# Patient Record
Sex: FEMALE | Race: WHITE | NOT HISPANIC OR LATINO | Employment: PART TIME | URBAN - METROPOLITAN AREA
[De-identification: names, ages, dates, MRNs, and addresses within clinical notes are randomized per-mention and may not be internally consistent; named-entity substitution may affect disease eponyms.]

---

## 2018-03-20 ENCOUNTER — CONVERSION ENCOUNTER (OUTPATIENT)
Dept: PODIATRY | Facility: CLINIC | Age: 53
End: 2018-03-20

## 2018-03-20 ENCOUNTER — OFFICE VISIT CONVERTED (OUTPATIENT)
Dept: ORTHOPEDIC SURGERY | Facility: CLINIC | Age: 53
End: 2018-03-20
Attending: PHYSICIAN ASSISTANT

## 2018-03-20 ENCOUNTER — OFFICE VISIT CONVERTED (OUTPATIENT)
Dept: PODIATRY | Facility: CLINIC | Age: 53
End: 2018-03-20
Attending: PODIATRIST

## 2018-03-20 ENCOUNTER — CONVERSION ENCOUNTER (OUTPATIENT)
Dept: ORTHOPEDIC SURGERY | Facility: CLINIC | Age: 53
End: 2018-03-20

## 2018-03-26 ENCOUNTER — OFFICE VISIT CONVERTED (OUTPATIENT)
Dept: PODIATRY | Facility: CLINIC | Age: 53
End: 2018-03-26
Attending: PODIATRIST

## 2018-10-18 ENCOUNTER — OFFICE VISIT CONVERTED (OUTPATIENT)
Dept: FAMILY MEDICINE CLINIC | Facility: CLINIC | Age: 53
End: 2018-10-18
Attending: NURSE PRACTITIONER

## 2018-10-29 ENCOUNTER — OFFICE VISIT CONVERTED (OUTPATIENT)
Dept: SURGERY | Facility: CLINIC | Age: 53
End: 2018-10-29
Attending: SURGERY

## 2018-11-13 ENCOUNTER — CONVERSION ENCOUNTER (OUTPATIENT)
Dept: ORTHOPEDIC SURGERY | Facility: CLINIC | Age: 53
End: 2018-11-13

## 2018-11-13 ENCOUNTER — OFFICE VISIT CONVERTED (OUTPATIENT)
Dept: ORTHOPEDIC SURGERY | Facility: CLINIC | Age: 53
End: 2018-11-13
Attending: ORTHOPAEDIC SURGERY

## 2018-11-20 ENCOUNTER — OFFICE VISIT CONVERTED (OUTPATIENT)
Dept: FAMILY MEDICINE CLINIC | Facility: CLINIC | Age: 53
End: 2018-11-20
Attending: NURSE PRACTITIONER

## 2018-11-20 ENCOUNTER — CONVERSION ENCOUNTER (OUTPATIENT)
Dept: SURGERY | Facility: CLINIC | Age: 53
End: 2018-11-20

## 2018-11-30 ENCOUNTER — CONVERSION ENCOUNTER (OUTPATIENT)
Dept: GENERAL RADIOLOGY | Facility: HOSPITAL | Age: 53
End: 2018-11-30

## 2018-12-28 ENCOUNTER — OFFICE VISIT CONVERTED (OUTPATIENT)
Dept: ORTHOPEDIC SURGERY | Facility: CLINIC | Age: 53
End: 2018-12-28
Attending: PHYSICIAN ASSISTANT

## 2019-01-16 ENCOUNTER — OFFICE VISIT CONVERTED (OUTPATIENT)
Dept: FAMILY MEDICINE CLINIC | Facility: CLINIC | Age: 54
End: 2019-01-16
Attending: NURSE PRACTITIONER

## 2019-01-25 ENCOUNTER — OFFICE VISIT CONVERTED (OUTPATIENT)
Dept: ORTHOPEDIC SURGERY | Facility: CLINIC | Age: 54
End: 2019-01-25
Attending: ORTHOPAEDIC SURGERY

## 2019-02-18 ENCOUNTER — HOSPITAL ENCOUNTER (OUTPATIENT)
Dept: FAMILY MEDICINE CLINIC | Facility: CLINIC | Age: 54
Discharge: HOME | End: 2019-02-18
Attending: NURSE PRACTITIONER

## 2019-02-18 LAB
ALBUMIN SERPL-MCNC: 4.2 G/DL (ref 3.5–5)
ALBUMIN/GLOB SERPL: 1.4 {RATIO} (ref 1.4–2.6)
ALP SERPL-CCNC: 95 U/L (ref 53–141)
ALT SERPL-CCNC: 8 U/L (ref 10–40)
ANION GAP SERPL CALC-SCNC: 13 MMOL/L (ref 8–19)
AST SERPL-CCNC: 12 U/L (ref 15–50)
BASOPHILS # BLD AUTO: 0.05 10*3/UL (ref 0–0.2)
BASOPHILS NFR BLD AUTO: 0.8 % (ref 0–3)
BILIRUB SERPL-MCNC: 0.31 MG/DL (ref 0.2–1.3)
BUN SERPL-MCNC: 8 MG/DL (ref 5–25)
BUN/CREAT SERPL: 13 {RATIO} (ref 6–20)
CALCIUM SERPL-MCNC: 9.2 MG/DL (ref 8.7–10.4)
CHLORIDE SERPL-SCNC: 103 MMOL/L (ref 99–111)
CHOLEST SERPL-MCNC: 217 MG/DL (ref 107–200)
CHOLEST/HDLC SERPL: 3.4 {RATIO} (ref 3–6)
CONV ABS IMM GRAN: 0.02 10*3/UL (ref 0–0.2)
CONV CO2: 28 MMOL/L (ref 22–32)
CONV IMMATURE GRAN: 0.3 % (ref 0–1.8)
CONV TOTAL PROTEIN: 7.2 G/DL (ref 6.3–8.2)
CREAT UR-MCNC: 0.64 MG/DL (ref 0.5–0.9)
DEPRECATED RDW RBC AUTO: 42.5 FL (ref 36.4–46.3)
EOSINOPHIL # BLD AUTO: 0.09 10*3/UL (ref 0–0.7)
EOSINOPHIL # BLD AUTO: 1.5 % (ref 0–7)
ERYTHROCYTE [DISTWIDTH] IN BLOOD BY AUTOMATED COUNT: 13.1 % (ref 11.7–14.4)
FOLATE SERPL-MCNC: >20 NG/ML (ref 4.8–20)
GFR SERPLBLD BASED ON 1.73 SQ M-ARVRAT: >60 ML/MIN/{1.73_M2}
GLOBULIN UR ELPH-MCNC: 3 G/DL (ref 2–3.5)
GLUCOSE SERPL-MCNC: 84 MG/DL (ref 65–99)
HBA1C MFR BLD: 12.1 G/DL (ref 12–16)
HCT VFR BLD AUTO: 39.3 % (ref 37–47)
HDLC SERPL-MCNC: 64 MG/DL (ref 40–60)
IRON SATN MFR SERPL: 16 % (ref 20–55)
IRON SERPL-MCNC: 62 UG/DL (ref 60–170)
LDLC SERPL CALC-MCNC: 136 MG/DL (ref 70–100)
LYMPHOCYTES # BLD AUTO: 1.91 10*3/UL (ref 1–5)
MCH RBC QN AUTO: 27 PG (ref 27–31)
MCHC RBC AUTO-ENTMCNC: 30.8 G/DL (ref 33–37)
MCV RBC AUTO: 87.7 FL (ref 81–99)
MONOCYTES # BLD AUTO: 0.49 10*3/UL (ref 0.2–1.2)
MONOCYTES NFR BLD AUTO: 8.3 % (ref 3–10)
NEUTROPHILS # BLD AUTO: 3.33 10*3/UL (ref 2–8)
NEUTROPHILS NFR BLD AUTO: 56.7 % (ref 30–85)
NRBC CBCN: 0 % (ref 0–0.7)
OSMOLALITY SERPL CALC.SUM OF ELEC: 288 MOSM/KG (ref 273–304)
PLATELET # BLD AUTO: 336 10*3/UL (ref 130–400)
PMV BLD AUTO: 10 FL (ref 9.4–12.3)
POTASSIUM SERPL-SCNC: 4.3 MMOL/L (ref 3.5–5.3)
RBC # BLD AUTO: 4.48 10*6/UL (ref 4.2–5.4)
SODIUM SERPL-SCNC: 140 MMOL/L (ref 135–147)
TIBC SERPL-MCNC: 399 UG/DL (ref 245–450)
TRANSFERRIN SERPL-MCNC: 279 MG/DL (ref 250–380)
TRIGL SERPL-MCNC: 84 MG/DL (ref 40–150)
VARIANT LYMPHS NFR BLD MANUAL: 32.4 % (ref 20–45)
VIT B12 SERPL-MCNC: 171 PG/ML (ref 211–911)
VLDLC SERPL-MCNC: 17 MG/DL (ref 5–37)
WBC # BLD AUTO: 5.89 10*3/UL (ref 4.8–10.8)

## 2019-02-22 ENCOUNTER — OFFICE VISIT CONVERTED (OUTPATIENT)
Dept: FAMILY MEDICINE CLINIC | Facility: CLINIC | Age: 54
End: 2019-02-22
Attending: NURSE PRACTITIONER

## 2019-02-22 ENCOUNTER — OFFICE VISIT CONVERTED (OUTPATIENT)
Dept: ORTHOPEDIC SURGERY | Facility: CLINIC | Age: 54
End: 2019-02-22
Attending: ORTHOPAEDIC SURGERY

## 2019-02-22 ENCOUNTER — CONVERSION ENCOUNTER (OUTPATIENT)
Dept: FAMILY MEDICINE CLINIC | Facility: CLINIC | Age: 54
End: 2019-02-22

## 2019-04-05 ENCOUNTER — OFFICE VISIT CONVERTED (OUTPATIENT)
Dept: ORTHOPEDIC SURGERY | Facility: CLINIC | Age: 54
End: 2019-04-05
Attending: PHYSICIAN ASSISTANT

## 2019-04-05 ENCOUNTER — HOSPITAL ENCOUNTER (OUTPATIENT)
Dept: GENERAL RADIOLOGY | Facility: HOSPITAL | Age: 54
Discharge: HOME | End: 2019-04-05

## 2019-05-09 ENCOUNTER — OFFICE VISIT CONVERTED (OUTPATIENT)
Dept: FAMILY MEDICINE CLINIC | Facility: CLINIC | Age: 54
End: 2019-05-09
Attending: NURSE PRACTITIONER

## 2019-05-17 ENCOUNTER — HOSPITAL ENCOUNTER (OUTPATIENT)
Dept: FAMILY MEDICINE CLINIC | Facility: CLINIC | Age: 54
Discharge: HOME | End: 2019-05-17
Attending: NURSE PRACTITIONER

## 2019-05-17 ENCOUNTER — OFFICE VISIT CONVERTED (OUTPATIENT)
Dept: FAMILY MEDICINE CLINIC | Facility: CLINIC | Age: 54
End: 2019-05-17
Attending: NURSE PRACTITIONER

## 2019-05-19 LAB — BACTERIA UR CULT: NORMAL

## 2019-07-02 ENCOUNTER — OFFICE VISIT CONVERTED (OUTPATIENT)
Dept: FAMILY MEDICINE CLINIC | Facility: CLINIC | Age: 54
End: 2019-07-02
Attending: NURSE PRACTITIONER

## 2019-08-09 ENCOUNTER — HOSPITAL ENCOUNTER (OUTPATIENT)
Dept: GENERAL RADIOLOGY | Facility: HOSPITAL | Age: 54
Discharge: HOME | End: 2019-08-09

## 2019-11-14 ENCOUNTER — OFFICE VISIT CONVERTED (OUTPATIENT)
Dept: FAMILY MEDICINE CLINIC | Facility: CLINIC | Age: 54
End: 2019-11-14
Attending: NURSE PRACTITIONER

## 2020-04-28 ENCOUNTER — TELEMEDICINE CONVERTED (OUTPATIENT)
Dept: FAMILY MEDICINE CLINIC | Facility: CLINIC | Age: 55
End: 2020-04-28
Attending: NURSE PRACTITIONER

## 2020-06-15 ENCOUNTER — HOSPITAL ENCOUNTER (OUTPATIENT)
Dept: FAMILY MEDICINE CLINIC | Facility: CLINIC | Age: 55
Discharge: HOME | End: 2020-06-15
Attending: NURSE PRACTITIONER

## 2020-06-15 LAB
25(OH)D3 SERPL-MCNC: 34 NG/ML (ref 30–100)
ALBUMIN SERPL-MCNC: 4.2 G/DL (ref 3.5–5)
ALBUMIN/GLOB SERPL: 1.5 {RATIO} (ref 1.4–2.6)
ALP SERPL-CCNC: 88 U/L (ref 53–141)
ALT SERPL-CCNC: 15 U/L (ref 10–40)
ANION GAP SERPL CALC-SCNC: 15 MMOL/L (ref 8–19)
APPEARANCE UR: ABNORMAL
AST SERPL-CCNC: 19 U/L (ref 15–50)
BASOPHILS # BLD AUTO: 0.07 10*3/UL (ref 0–0.2)
BASOPHILS NFR BLD AUTO: 1.4 % (ref 0–3)
BILIRUB SERPL-MCNC: 0.5 MG/DL (ref 0.2–1.3)
BILIRUB UR QL: NEGATIVE
BUN SERPL-MCNC: 9 MG/DL (ref 5–25)
BUN/CREAT SERPL: 16 {RATIO} (ref 6–20)
CALCIUM SERPL-MCNC: 9.1 MG/DL (ref 8.7–10.4)
CHLORIDE SERPL-SCNC: 104 MMOL/L (ref 99–111)
CHOLEST SERPL-MCNC: 186 MG/DL (ref 107–200)
CHOLEST/HDLC SERPL: 2.7 {RATIO} (ref 3–6)
COLOR UR: YELLOW
CONV ABS IMM GRAN: 0.02 10*3/UL (ref 0–0.2)
CONV BACTERIA: NEGATIVE
CONV CO2: 26 MMOL/L (ref 22–32)
CONV COLLECTION SOURCE (UA): ABNORMAL
CONV IMMATURE GRAN: 0.4 % (ref 0–1.8)
CONV TOTAL PROTEIN: 7 G/DL (ref 6.3–8.2)
CONV UROBILINOGEN IN URINE BY AUTOMATED TEST STRIP: 0.2 {EHRLICHU}/DL (ref 0.1–1)
CREAT UR-MCNC: 0.57 MG/DL (ref 0.5–0.9)
DEPRECATED RDW RBC AUTO: 42.6 FL (ref 36.4–46.3)
EOSINOPHIL # BLD AUTO: 0.12 10*3/UL (ref 0–0.7)
EOSINOPHIL # BLD AUTO: 2.4 % (ref 0–7)
ERYTHROCYTE [DISTWIDTH] IN BLOOD BY AUTOMATED COUNT: 12.9 % (ref 11.7–14.4)
FOLATE SERPL-MCNC: 18.9 NG/ML (ref 4.8–20)
GFR SERPLBLD BASED ON 1.73 SQ M-ARVRAT: >60 ML/MIN/{1.73_M2}
GLOBULIN UR ELPH-MCNC: 2.8 G/DL (ref 2–3.5)
GLUCOSE SERPL-MCNC: 91 MG/DL (ref 65–99)
GLUCOSE UR QL: NEGATIVE MG/DL
HCT VFR BLD AUTO: 41 % (ref 37–47)
HDLC SERPL-MCNC: 70 MG/DL (ref 40–60)
HGB BLD-MCNC: 13.2 G/DL (ref 12–16)
HGB UR QL STRIP: NEGATIVE
IRON SATN MFR SERPL: 28 % (ref 20–55)
IRON SERPL-MCNC: 96 UG/DL (ref 60–170)
KETONES UR QL STRIP: NEGATIVE MG/DL
LDLC SERPL CALC-MCNC: 100 MG/DL (ref 70–100)
LEUKOCYTE ESTERASE UR QL STRIP: NEGATIVE
LYMPHOCYTES # BLD AUTO: 2.01 10*3/UL (ref 1–5)
LYMPHOCYTES NFR BLD AUTO: 40.6 % (ref 20–45)
MCH RBC QN AUTO: 29.2 PG (ref 27–31)
MCHC RBC AUTO-ENTMCNC: 32.2 G/DL (ref 33–37)
MCV RBC AUTO: 90.7 FL (ref 81–99)
MONOCYTES # BLD AUTO: 0.46 10*3/UL (ref 0.2–1.2)
MONOCYTES NFR BLD AUTO: 9.3 % (ref 3–10)
NEUTROPHILS # BLD AUTO: 2.27 10*3/UL (ref 2–8)
NEUTROPHILS NFR BLD AUTO: 45.9 % (ref 30–85)
NITRITE UR QL STRIP: NEGATIVE
NRBC CBCN: 0 % (ref 0–0.7)
OSMOLALITY SERPL CALC.SUM OF ELEC: 290 MOSM/KG (ref 273–304)
PH UR STRIP.AUTO: 7.5 [PH] (ref 5–8)
PLATELET # BLD AUTO: 274 10*3/UL (ref 130–400)
PMV BLD AUTO: 10.7 FL (ref 9.4–12.3)
POTASSIUM SERPL-SCNC: 3.9 MMOL/L (ref 3.5–5.3)
PROT UR QL: NEGATIVE MG/DL
RBC # BLD AUTO: 4.52 10*6/UL (ref 4.2–5.4)
RBC #/AREA URNS HPF: ABNORMAL /[HPF]
SODIUM SERPL-SCNC: 141 MMOL/L (ref 135–147)
SP GR UR: 1.01 (ref 1–1.03)
TIBC SERPL-MCNC: 339 UG/DL (ref 245–450)
TRANSFERRIN SERPL-MCNC: 237 MG/DL (ref 250–380)
TRIGL SERPL-MCNC: 79 MG/DL (ref 40–150)
VIT B12 SERPL-MCNC: 530 PG/ML (ref 211–911)
VLDLC SERPL-MCNC: 16 MG/DL (ref 5–37)
WBC # BLD AUTO: 4.95 10*3/UL (ref 4.8–10.8)
WBC #/AREA URNS HPF: ABNORMAL /[HPF]

## 2020-06-19 ENCOUNTER — HOSPITAL ENCOUNTER (OUTPATIENT)
Dept: FAMILY MEDICINE CLINIC | Facility: CLINIC | Age: 55
Discharge: HOME | End: 2020-06-19
Attending: NURSE PRACTITIONER

## 2020-06-19 ENCOUNTER — OFFICE VISIT CONVERTED (OUTPATIENT)
Dept: FAMILY MEDICINE CLINIC | Facility: CLINIC | Age: 55
End: 2020-06-19
Attending: NURSE PRACTITIONER

## 2020-06-25 LAB
CONV LAST MENSTURAL PERIOD: NORMAL
SPECIMEN SOURCE: NORMAL
SPECIMEN SOURCE: NORMAL
THIN PREP CVX: NORMAL

## 2020-12-21 ENCOUNTER — HOSPITAL ENCOUNTER (OUTPATIENT)
Dept: OTHER | Facility: HOSPITAL | Age: 55
Discharge: HOME | End: 2020-12-21
Attending: NURSE PRACTITIONER

## 2020-12-23 LAB
CONV MUMPS ANTIBODY IGG: 15.4 AU/ML
HBV SURFACE AB SER QL: NON REACTIVE
MEV IGG SER IA-ACNC: 41.1 AU/ML
RUBV IGG SER-ACNC: 63.62 [IU]/ML
VZV IGG SER IA-ACNC: 441 INDEX

## 2021-01-04 ENCOUNTER — HOSPITAL ENCOUNTER (OUTPATIENT)
Dept: OTHER | Facility: HOSPITAL | Age: 56
Discharge: HOME | End: 2021-01-04
Attending: EMERGENCY MEDICINE

## 2021-01-08 ENCOUNTER — OFFICE VISIT CONVERTED (OUTPATIENT)
Dept: FAMILY MEDICINE CLINIC | Facility: CLINIC | Age: 56
End: 2021-01-08
Attending: NURSE PRACTITIONER

## 2021-01-08 ENCOUNTER — HOSPITAL ENCOUNTER (OUTPATIENT)
Dept: FAMILY MEDICINE CLINIC | Facility: CLINIC | Age: 56
Discharge: HOME | End: 2021-01-08
Attending: NURSE PRACTITIONER

## 2021-01-08 ENCOUNTER — CONVERSION ENCOUNTER (OUTPATIENT)
Dept: FAMILY MEDICINE CLINIC | Facility: CLINIC | Age: 56
End: 2021-01-08

## 2021-01-08 LAB
ALBUMIN SERPL-MCNC: 4.4 G/DL (ref 3.5–5)
ALBUMIN/GLOB SERPL: 1.6 {RATIO} (ref 1.4–2.6)
ALP SERPL-CCNC: 92 U/L (ref 53–141)
ALT SERPL-CCNC: 24 U/L (ref 10–40)
ANION GAP SERPL CALC-SCNC: 15 MMOL/L (ref 8–19)
AST SERPL-CCNC: 26 U/L (ref 15–50)
BASOPHILS # BLD AUTO: 0.07 10*3/UL (ref 0–0.2)
BASOPHILS NFR BLD AUTO: 1.3 % (ref 0–3)
BILIRUB SERPL-MCNC: 0.32 MG/DL (ref 0.2–1.3)
BUN SERPL-MCNC: 8 MG/DL (ref 5–25)
BUN/CREAT SERPL: 11 {RATIO} (ref 6–20)
CALCIUM SERPL-MCNC: 9.4 MG/DL (ref 8.7–10.4)
CHLORIDE SERPL-SCNC: 102 MMOL/L (ref 99–111)
CHOLEST SERPL-MCNC: 244 MG/DL (ref 107–200)
CHOLEST/HDLC SERPL: 3.3 {RATIO} (ref 3–6)
CONV ABS IMM GRAN: 0.02 10*3/UL (ref 0–0.2)
CONV CO2: 27 MMOL/L (ref 22–32)
CONV IMMATURE GRAN: 0.4 % (ref 0–1.8)
CONV TOTAL PROTEIN: 7.1 G/DL (ref 6.3–8.2)
CREAT UR-MCNC: 0.7 MG/DL (ref 0.5–0.9)
DEPRECATED RDW RBC AUTO: 40.3 FL (ref 36.4–46.3)
EOSINOPHIL # BLD AUTO: 0.11 10*3/UL (ref 0–0.7)
EOSINOPHIL # BLD AUTO: 2 % (ref 0–7)
ERYTHROCYTE [DISTWIDTH] IN BLOOD BY AUTOMATED COUNT: 12.2 % (ref 11.7–14.4)
FOLATE SERPL-MCNC: 7.8 NG/ML (ref 4.8–20)
GFR SERPLBLD BASED ON 1.73 SQ M-ARVRAT: >60 ML/MIN/{1.73_M2}
GLOBULIN UR ELPH-MCNC: 2.7 G/DL (ref 2–3.5)
GLUCOSE SERPL-MCNC: 88 MG/DL (ref 65–99)
HCT VFR BLD AUTO: 42.2 % (ref 37–47)
HDLC SERPL-MCNC: 73 MG/DL (ref 40–60)
HGB BLD-MCNC: 13.7 G/DL (ref 12–16)
LDLC SERPL CALC-MCNC: 145 MG/DL (ref 70–100)
LYMPHOCYTES # BLD AUTO: 2.11 10*3/UL (ref 1–5)
LYMPHOCYTES NFR BLD AUTO: 38.9 % (ref 20–45)
MCH RBC QN AUTO: 29.1 PG (ref 27–31)
MCHC RBC AUTO-ENTMCNC: 32.5 G/DL (ref 33–37)
MCV RBC AUTO: 89.8 FL (ref 81–99)
MONOCYTES # BLD AUTO: 0.51 10*3/UL (ref 0.2–1.2)
MONOCYTES NFR BLD AUTO: 9.4 % (ref 3–10)
NEUTROPHILS # BLD AUTO: 2.6 10*3/UL (ref 2–8)
NEUTROPHILS NFR BLD AUTO: 48 % (ref 30–85)
NRBC CBCN: 0 % (ref 0–0.7)
OSMOLALITY SERPL CALC.SUM OF ELEC: 288 MOSM/KG (ref 273–304)
PLATELET # BLD AUTO: 324 10*3/UL (ref 130–400)
PMV BLD AUTO: 10.2 FL (ref 9.4–12.3)
POTASSIUM SERPL-SCNC: 4.2 MMOL/L (ref 3.5–5.3)
RBC # BLD AUTO: 4.7 10*6/UL (ref 4.2–5.4)
SODIUM SERPL-SCNC: 140 MMOL/L (ref 135–147)
TRIGL SERPL-MCNC: 129 MG/DL (ref 40–150)
VIT B12 SERPL-MCNC: 293 PG/ML (ref 211–911)
VLDLC SERPL-MCNC: 26 MG/DL (ref 5–37)
WBC # BLD AUTO: 5.42 10*3/UL (ref 4.8–10.8)

## 2021-01-09 LAB
25(OH)D3 SERPL-MCNC: 29.2 NG/ML (ref 30–100)
IRON SATN MFR SERPL: 27 % (ref 20–55)
IRON SERPL-MCNC: 107 UG/DL (ref 60–170)
TIBC SERPL-MCNC: 390 UG/DL (ref 245–450)
TRANSFERRIN SERPL-MCNC: 273 MG/DL (ref 250–380)

## 2021-01-10 LAB — BACTERIA UR CULT: NORMAL

## 2021-05-12 NOTE — PROGRESS NOTES
"   Progress Note      Patient Name: Pam Cade   Patient ID: [id number]   Sex: Female   YOB: 1965    Primary Care Provider: Ally TOSCANO   Referring Provider: Ally TOSCANO    Visit Date: April 28, 2020    Provider: DORCAS Shine   Location: [facility]   Location Address: [address] [place] [address]  [id number]   Location Phone: [phone]          Chief Complaint  · 6 month follow-up on HTN, hyperlipidemia, GERD, and migraines      History Of Present Illness  Video Conferencing Visit  Pam Cade is a 55 year old /White female who is presenting for evaluation via video conferencing. Verbal consent obtained before beginning visit.   The following staff were present during this visit: Candice Edge MA   Pam Cade is a 55 year old /White female who presents for evaluation and treatment of:      6 month follow-up on HTN, hyperlipidemia, GERD, and migraines    Had hernia repair and gallbladder removal in November with Lalo Cool at [facility]   \"Feels so much better, like a new person\"    pt states Will get labs drawn after COVID protocols calm down at [facility]  Still has lab requisition that is due in May    checking blood pressure at home  today's reading 128/76       Past Medical History  Disease Name Date Onset Notes   Ankle pain --  --    Arthritis --  --    B12 deficiency 02/22/2019 --    Bladder Disorder --  --    Cramps of lower extremity --  --    Folic acid deficiency 02/22/2019 --    Foot pain, left 03/20/2018 --    High cholesterol --  --    Hypertension --  --    Iron deficiency 02/22/2019 --    Kidney problem --  --    Reflux --  --    Ulcer --  --          Past Surgical History  Procedure Name Date Notes   Artificial Joints/Limbs --  --    Bladder sling surgery in female --  --    Hysterectomy --  --    Joint Surgery --  --    Knee replacement, left --  --    Neurostimulator. --  -- "          Medication List  Name Date Started Instructions   cyanocobalamin (vitamin B-12) 1,000 mcg/mL injection solution 04/28/2020 inject 0.1 milliliter (100 mcg) by intramuscular route once a month for 90 days   fluticasone propionate 50 mcg/actuation nasal spray,suspension 04/28/2020 inhale 2 sprays (100 mcg) in each nostril by intranasal route once daily for 90 days   folic acid 1 mg oral tablet 04/28/2020 take 1 tablet (1 mg) by oral route once daily for 90 days   metoprolol succinate 25 mg oral tablet extended release 24 hr 04/28/2020 take 1 tablet (25 mg) by oral route once daily for 90 days   omeprazole 20 mg oral tablet,delayed release (DR/EC) 04/28/2020 take 1 tablet by oral route daily for 90 days   ONE DAILY MULTIVITAMIN WOMENS 200'S 03/17/2020 TAKE 1 TABLET BY MOUTH EVERY DAY WITH FOOD   Relpax 40 mg oral tablet 04/28/2020 take 1 tablet (40 mg) by oral route ; if headache returns, the dose may be repeated after 2 hours   Topamax 50 mg oral tablet 04/28/2020 one tablet po bid   Tums Ultra 1,177 mg oral tablet,chewable  chew 3-4 tablets by oral route daily as needed   valacyclovir 500 mg oral tablet 04/28/2020 take 1 tablet (500 mg) by oral route every 12 hours for 3 days   Vitamin C 500 mg oral tablet extended release 04/28/2020 take 1 tablet by oral route daily for 90 days   Vitamin D3 125 mcg (5,000 unit) oral tablet 04/28/2020 take 1 tablet by oral route daily for 90 days         Allergy List  Allergen Name Date Reaction Notes   NO KNOWN DRUG ALLERGIES --  --  --          Family Medical History  Disease Name Relative/Age Notes   Cancer, Unspecified Daughter/  Sister/   Sister; Daughter   *No Known Family History  --    Family history of certain chronic disabling diseases; arthritis Daughter/   Daughter         Social History  Finding Status Start/Stop Quantity Notes   Alcohol Never --/-- --  --    Alcohol Use Current some day --/-- --  occasionally drinks, has been drinking for 6-10 years  11/14/2017  - occasionally drinks, has been drinking for 6-10 years   Claustophobic Unknown --/-- --  yes   lives alone --  --/-- --  --    lives with spouse --  --/-- --  --    Recreational Drug Use Never --/-- --  no   Tobacco Never --/-- --  never smoker    --  --/-- --  --    Working --  --/-- --  --          Immunizations  NameDate Admin Mfg Trade Name Lot Number Route Inj VIS Given VIS Publication   Tdap11/08/2018 SKB BOOSTRIX [id number] IM LD 11/08/2018 02/24/2015   Comments: Pt tolerated well         Review of Systems  · Constitutional  o Denies  o : fatigue  · Eyes  o Denies  o : blurred vision, changes in vision  · HENT  o Denies  o : headaches  · Cardiovascular  o Denies  o : chest pain, irregular heart beats, rapid heart rate, dyspnea on exertion  · Respiratory  o Denies  o : shortness of breath, wheezing, cough  · Gastrointestinal  o Denies  o : nausea, vomiting, diarrhea, constipation, abdominal pain, blood in stools, melena  · Genitourinary  o Denies  o : frequency, dysuria, hematuria  · Integument  o Denies  o : rash, new skin lesions  · Musculoskeletal  o Denies  o : joint pain, joint swelling, muscle pain  · Endocrine  o Admits  o : central obesity  o Denies  o : polyuria, polydipsia      Physical Examination  · Constitutional  o Appearance  o : well-nourished, in no acute distress  · Eyes  o Conjunctivae  o : conjunctivae normal  o Sclerae  o : sclerae white  o Eyelids/Ocular Adnexae  o : eyelid appearance normal, no exudates present  · Neck  o Inspection/Palpation  o : normal appearance, no masses or tenderness, trachea midline  o Range of Motion  o : cervical range of motion within normal limits  o Thyroid  o : gland size normal, nontender, no nodules or masses present on palpation  · Respiratory  o Respiratory Effort  o : breathing unlabored  o Inspection of Chest  o : normal appearance  o Auscultation of Lungs  o : normal breath sounds throughout inspiration and expiration  · Cardiovascular  o Heart  o :    § Auscultation of Heart  § : regular rate and rhythm, no murmurs, gallops or rubs  o Peripheral Vascular System  o :   § Carotid Arteries  § : normal pulses bilaterally, no bruits present  § Pedal Pulses  § : pulses 2 bilaterally  § Extremities  § : no clubbing or edema  · Musculoskeletal  o Spine  o :   § Inspection/Palpation  § : no spinal tenderness, scoliosis or kyphosis present  § Range of Motion  § : spine range of motion normal  o Right Upper Extremity  o :   § Inspection/Palpation  § : no tenderness to palpation, ROM normal  o Left Upper Extremity  o :   § Inspection/Palpation  § : no tenderness to palpation, ROM normal  o Right Lower Extremity  o :   § Inspection/Palpation  § : no joint or limb tenderness to palpation, ROM normal  o Left Lower Extremity  o :   § Inspection/Palpation  § : no joint or limb tenderness to palpation, ROM normal  · Skin and Subcutaneous Tissue  o General Inspection  o : no rashes or lesions present, no areas of discoloration  o Body Hair  o : hair normal for age, general body hair distribution normal for age  o Digits and Nails  o : no clubbing, cyanosis, deformities or edema present, normal appearing nails  · Neurologic  o Mental Status Examination  o :   § Orientation  § : grossly oriented to person, place and time  o Gait and Station  o : normal gait, able to stand without difficulty  · Psychiatric  o Judgement and Insight  o : judgment and insight intact  o Mood and Affect  o : mood normal, affect appropriate          Assessment  · Allergic rhinitis due to allergen     477.9/J30.9  currently controlled   · Essential hypertension     401.9/I10  currently controlled   · GERD (gastroesophageal reflux disease)     530.81/K21.9  currently controlled   · Hyperlipidemia     272.4/E78.5  will obtain lipid panel   · Vitamin D deficiency     268.9/E55.9  continue supplementation   · Migraines     346.90/G43.909  currently controlled   · B12 deficiency     266.2/E53.8  continue  supplementation   · Folic acid deficiency     266.2/E53.8  continue supplementation      Plan  · Orders  o ACO-39: Current medications updated and reviewed () - - 04/28/2020  o ACO-14: Influenza immunization administered or previously received () - - 04/28/2020  · Medications  o cyanocobalamin (vitamin B-12) 1,000 mcg/mL injection solution   SIG: inject 0.1 milliliter (100 mcg) by intramuscular route once a month for 90 days   DISP: (3) 1 ml vial with 1 refills  Refilled on 04/28/2020     o fluticasone propionate 50 mcg/actuation nasal spray,suspension   SIG: inhale 2 sprays (100 mcg) in each nostril by intranasal route once daily for 90 days   DISP: (3) 16 gm aer w/adap with 1 refills  Refilled on 04/28/2020     o folic acid 1 mg oral tablet   SIG: take 1 tablet (1 mg) by oral route once daily for 90 days   DISP: (90) tablets with 1 refills  Refilled on 04/28/2020     o metoprolol succinate 25 mg oral tablet extended release 24 hr   SIG: take 1 tablet (25 mg) by oral route once daily for 90 days   DISP: (90) tablet with 1 refills  Refilled on 04/28/2020     o omeprazole 20 mg oral tablet,delayed release (DR/EC)   SIG: take 1 tablet by oral route daily for 90 days   DISP: (90) tablets with 1 refills  Refilled on 04/28/2020     o Relpax 40 mg oral tablet   SIG: take 1 tablet (40 mg) by oral route ; if headache returns, the dose may be repeated after 2 hours   DISP: (10) tablets with 1 refills  Refilled on 04/28/2020     o Topamax 50 mg oral tablet   SIG: one tablet po bid   DISP: (180) tablets with 1 refills  Refilled on 04/28/2020     o valacyclovir 500 mg oral tablet   SIG: take 1 tablet (500 mg) by oral route every 12 hours for 3 days   DISP: (6) tablets with 5 refills  Refilled on 04/28/2020     o Vitamin C 500 mg oral tablet extended release   SIG: take 1 tablet by oral route daily for 90 days   DISP: (90) tablets with 1 refills  Refilled on 04/28/2020     o Vitamin D3 5,000 unit oral tablet   SIG:  take 1 tablet by oral route daily for 90 days   DISP: (90) tablets with 1 refills  Refilled on 04/28/2020     · Instructions  o Patient was educated/instructed on their diagnosis, treatment and medications prior to discharge from the clinic today.            Electronically Signed by: DORCAS Shine -Author on April 28, 2020 09:15:35 AM

## 2021-05-13 NOTE — PROGRESS NOTES
Progress Note      Patient Name: Pam Cade   Patient ID: [id number]   Sex: Female   YOB: 1965    Primary Care Provider: Ally TOSCANO   Referring Provider: Ally TOSCANO    Visit Date: June 19, 2020    Provider: DORCAS Shine   Location: [facility]   Location Address: [address] [place] [address]  [id number]   Location Phone: [phone]          Chief Complaint  · Annual Exam  · PAP exam  · follow-up on HTN, hyperlipidemia, GERD, and migraines   · Lab results      History Of Present Illness  Last PAP Smear: 11/2018   Date of Last Mammogram: 2018   Date of Last Colonoscopy: 11/2018   Pam Cade is a 55 year old /White female who presents for evaluation and treatment of:      follow-up on HTN, hyperlipidemia, GERD, and migraines   lab results    Patient is here for a PAP    Patient has asked for a TDAP, her daughter is having a baby    Patient had a hysterectomy 28 years ago and that was when she had her last menstrual cycle  after hemorrhage with last pregnancy, ovaries remain     Patient is due for a Mammo    pt c/o sinus pressure and sinus congestion for a few days  taking allergy  medication as directed  doing more activities outside       Past Medical History  Disease Name Date Onset Notes   Ankle pain --  --    Arthritis --  --    B12 deficiency 02/22/2019 --    Bladder Disorder --  --    Cramps of lower extremity --  --    Folic acid deficiency 02/22/2019 --    Foot pain, left 03/20/2018 --    High cholesterol --  --    Hypertension --  --    Iron deficiency 02/22/2019 --    Kidney problem --  --    Reflux --  --    Ulcer --  --          Past Surgical History  Procedure Name Date Notes   Artificial Joints/Limbs --  --    Bladder sling surgery in female --  --    Hysterectomy --  --    Joint Surgery --  --    Knee replacement, left --  --    Neurostimulator. --  --          Medication List  Name Date Started  Instructions   cyanocobalamin (vitamin B-12) 1,000 mcg/mL injection solution 04/28/2020 inject 0.1 milliliter (100 mcg) by intramuscular route once a month for 90 days   fluticasone propionate 50 mcg/actuation nasal spray,suspension 04/28/2020 inhale 2 sprays (100 mcg) in each nostril by intranasal route once daily for 90 days   folic acid 1 mg oral tablet 04/28/2020 take 1 tablet (1 mg) by oral route once daily for 90 days   metoprolol succinate 25 mg oral tablet extended release 24 hr 04/28/2020 take 1 tablet (25 mg) by oral route once daily for 90 days   ONE DAILY MULTIVITAMIN WOMENS 200'S 06/19/2020 1 tab daily   Relpax 40 mg oral tablet 04/28/2020 take 1 tablet (40 mg) by oral route ; if headache returns, the dose may be repeated after 2 hours   Topamax 50 mg oral tablet 04/28/2020 one tablet po bid   Tums Ultra 1,177 mg oral tablet,chewable  chew 3-4 tablets by oral route daily as needed   valacyclovir 500 mg oral tablet 04/28/2020 take 1 tablet (500 mg) by oral route every 12 hours for 3 days   Vitamin C 500 mg oral tablet extended release 04/28/2020 take 1 tablet by oral route daily for 90 days   Vitamin D3 125 mcg (5,000 unit) oral tablet 04/28/2020 take 1 tablet by oral route daily for 90 days         Allergy List  Allergen Name Date Reaction Notes   NO KNOWN DRUG ALLERGIES --  --  --          Family Medical History  Disease Name Relative/Age Notes   Cancer, Unspecified Daughter/  Sister/   Sister; Daughter   *No Known Family History  --    Family history of certain chronic disabling diseases; arthritis Daughter/   Daughter         Social History  Finding Status Start/Stop Quantity Notes   Alcohol Never --/-- --  06/19/2020 - occasionally    Alcohol Use Current some day --/-- --  occasionally drinks, has been drinking for 6-10 years  11/14/2017 - occasionally drinks, has been drinking for 6-10 years   Claustophobic Unknown --/-- --  yes   lives alone --  --/-- --  --    lives with spouse --  --/-- --  --   "  Recreational Drug Use Never --/-- --  no   Tobacco Never --/-- --  never smoker    --  --/-- --  --    Working --  --/-- --  --          Immunizations  NameDate Admin Mfg Trade Name Lot Number Route Inj VIS Given VIS Publication   Tdap11/08/2018 SKB BOOSTRIX [id number] IM LD 11/08/2018 02/24/2015   Comments: Pt tolerated well         Review of Systems  · Constitutional  o Denies  o : fever  · Eyes  o Denies  o : blurred vision, changes in vision  · HENT  o Admits  o : headaches, sinus pain, nasal congestion  · Breasts  o Denies  o : lumps, tenderness, swelling  · Cardiovascular  o Denies  o : chest pain, lower extremity edema  · Respiratory  o Denies  o : cough  · Gastrointestinal  o Denies  o : nausea, vomiting, diarrhea, constipation, abdominal pain  · Genitourinary  o Denies  o : dysuria, vaginal discharge  · Integument  o Denies  o : rash  · Endocrine  o Admits  o : central obesity  o Denies  o : polyuria, polydipsia  · Allergic-Immunologic  o Admits  o : sinus allergy symptoms      Vitals  Date Time BP Position Site L\R Cuff Size HR RR TEMP (F) WT  HT  BMI kg/m2 BSA m2 O2 Sat HC       07/02/2019 07:41 /86 Sitting    96 - R 18 98 209lbs 0oz 5'  4\" 35.87 2.07 98 %    11/14/2019 02:57 /83 Sitting    79 - R 18 98.2 215lbs 2oz 5'  4\" 36.93 2.1 98 %    06/19/2020 08:32 /79 Sitting    68 - R  98.4 202lbs 4oz 5'  3\" 35.83 2.02 99 %          Physical Examination  · Constitutional  o Appearance  o : well-nourished, in no acute distress  · Head and Face  o Face  o :   § Palpation  § : No sinus tenderness on palpation  · Eyes  o Conjunctivae  o : injected bilaterally  o Eyelids/Ocular Adnexae  o : No periorbital swelling, no allergic shiners  · Ears, Nose, Mouth and Throat  o Ears  o :   § External Ears  § : no auricle tenderness to palpation present  § Otoscopic Examination  § : Tympanic membrane appearance within normal limits bilaterally without perforations  o Nose  o :   § Intranasal Exam  § : " inflamed nasal mucosa, no mucoid nasal discharge, no polyps or septal deviation  o Oral Cavity  o :   § Oral Mucosa  § : Moist mucus membranes  § Tongue  § : Coated  o Throat  o :   § Oropharynx  § : Np pharyngeal erythema or exudate  · Neck  o Inspection/Palpation  o : normal appearance, no masses or tenderness, trachea midline  o Thyroid  o : gland size normal, nontender  · Respiratory  o Respiratory Effort  o : breathing unlabored  o Inspection of Chest  o : normal appearance  o Auscultation of Lungs  o : normal breath sounds throughout  · Cardiovascular  o Heart  o :   § Auscultation of Heart  § : regular rate and rhythm, no murmurs, gallops or rubs  · Breasts  o Inspection of Breasts  o : breasts symmetrical, no skin changes, no deformities present, no discharge present  o Palpation of Breasts, Axillae  o : no masses present on palpation, no breast tenderness  · Gastrointestinal  o Abdominal Examination  o : abdomen nontender to palpation, tone normal without rigidity or guarding, no masses present, normal bowel sounds  · Genitourinary  o External Genitalia  o : no inflammation, no lesions present  o Vagina  o : normal vaginal vault, no discharge present, no inflammatory lesions present, no masses present  o Bladder  o : nontender to palpation  o Adnexa  o : no tenderness or masses present on bimanual examination  o Anus  o : no inflammation or lesions present  o Perineum  o : perineum within normal limits  · Lymphatic  o Neck  o : no lymphadenopathy present  o Axilla  o : no lymphadenopathy present  o Groin  o : no lymphadenopathy present  · Skin and Subcutaneous Tissue  o General Inspection  o : Color normal, no rash, good turgor  · Neurologic  o Mental Status Examination  o :   § Orientation  § : grossly oriented to person, place and time  o Gait and Station  o : normal gait, able to stand without difficulty  · Psychiatric  o Judgement and Insight  o : judgment and insight intact  o Mood and Affect  o : mood  normal, affect appropriate              Assessment  · Routine gynecological examination     V72.31/Z01.419  · Pap smear, as part of routine gynecological examination     V76.2/Z01.419  · Screening Mammogram     V76.10/Z12.39  · Allergic rhinitis due to allergen     477.9/J30.9  · Anemia     285.9/D64.9  iron level normal   · Essential hypertension     401.9/I10  currently controlled  · GERD (gastroesophageal reflux disease)     530.81/K21.9  currently controlled  · Hyperlipidemia     272.4/E78.5  stable at this time   · Vitamin D deficiency     268.9/E55.9  level normal continue supplementation   · Migraines     346.90/G43.909  currently controlled  · B12 deficiency     266.2/E53.8  · Folic acid deficiency     266.2/E53.8  · Sinus pressure     478.19/J34.89      Plan  · Orders  o Vaginal Screen (Candida, Gardnerella & Trichomonas) (31598) - V72.31/Z01.419 - 06/19/2020  o Pap smear (32102) - V76.2/Z01.419 - 06/19/2020  o Screening Mammogram 3D Bilateral (56239, , 81266) - V76.10/Z12.39 - 06/19/2020  o B12 Folate levels (B12FO) - 285.9/D64.9 - 12/19/2020  o Iron panel (iron, TIBC, transferrin saturation) (48195, 07803, 08279) - 285.9/D64.9 - 12/19/2020  o HTN/Lipid Panel (CMP, Lipid) [facility] (05912, 47007) - - 12/19/2020  o CBC with Auto Diff [facility] (80629) - - 12/19/2020  o Urinalysis with Reflex Microscopy if abnormal ([facility]) (47869) - - 12/19/2020  o Vitamin D Level (67413) - 268.9/E55.9 - 12/19/2020  o IM/SQ - Injection Fee [facility] (91267) - - 06/19/2020  o ACO-39: Current medications updated and reviewed () - - 06/19/2020  o Est. Pt. 25 Min. Moderate/High (13178) - 272.4/E78.5, 401.9/I10, 530.81/K21.9, 346.90/G43.909 - 06/19/2020  o Depo-Medrol 80 () - - 06/19/2020   Injection - Depo Medrol 80 mg; Dose: 80 mg; Site: Right Gluteus; Route: intramuscular; Date: 06/19/2020 09:23:25; Exp: 04/20/2021; Lot: [id number]; Mfg: TEVA PHARM; TradeName: methylprednisolone; Location: [facility]; Administered By:  Candice Edge MA; Comment: [id number]  · Medications  o ONE DAILY MULTIVITAMIN WOMENS 200'S   SI tab daily   DISP: (90) with 2 refills  Adjusted on 2020     o Medications have been Reconciled  o Transition of Care or Provider Policy  · Instructions  o **Pap Test/Liquid Based:   o Thin Prep  o Vaginal  o **Perform Reflex Human Papilloma Virus (HPV) High Risk on this Pap (If atypical squamous cells of the undetermined signifigcance (ASCUS)/Atypical Glandular Cells of undetermined significance (AGCUS): Low Grade Squamous Intraepitheal lesion (LGSIL): **  o Yes, upon instruction from sending office  o Medicare:  o No  o **Is this an annual PAP:  o Yes  o Hysterectomy:   o Advised that cheeses and other sources of dairy fats, animal fats, fast food, and the extras (candy, pastries, pies, doughnuts and cookies) all contain LDL raising nutrients. Advised to increase fruits, vegetables, whole grains, and to monitor portion sizes.   o Counseled on monthly breast self exams.   o Counseled on STD prevention.  o Counseled on diet and exercise.   o Counseled on weight-bearing exercise.  o Recommended Calcium with Vitamin D twice daily.  o Patient was educated/instructed on their diagnosis, treatment and medications prior to discharge from the clinic today.            Electronically Signed by: DORCAS Shine -Author on 2020 09:27:23 AM

## 2021-05-14 VITALS
HEART RATE: 89 BPM | TEMPERATURE: 99.1 F | HEIGHT: 63 IN | DIASTOLIC BLOOD PRESSURE: 81 MMHG | SYSTOLIC BLOOD PRESSURE: 125 MMHG | SYSTOLIC BLOOD PRESSURE: 143 MMHG | WEIGHT: 198.37 LBS | BODY MASS INDEX: 35.15 KG/M2 | OXYGEN SATURATION: 96 % | DIASTOLIC BLOOD PRESSURE: 81 MMHG | RESPIRATION RATE: 18 BRPM

## 2021-05-14 NOTE — PROGRESS NOTES
Progress Note      Patient Name: Pam Cade   Patient ID: [id number]   Sex: Female   YOB: 1965    Primary Care Provider: Ally TOSCANO   Referring Provider: Ally TOSCANO    Visit Date: January 8, 2021    Provider: DORCSA Shine   Location: [facility]   Location Address: [address] [place], [place] [id number]   Location Phone: [phone]          Chief Complaint  · follow-up on HTN, hyperlipidemia, GERD, allergies, and migraines      History Of Present Illness  Pam Cade is a 56 year old /White female who presents for evaluation and treatment of:      follow-up on HTN, hyperlipidemia, GERD, allergies, and migraines  medication refills    c/o- states that she had the TSpot done to start working at [employer], and it was positive. Employee health had her do a chest Xray and it was clear.     mammo- 11/30/2018  pap-6/19/2020  colonoscopy- 11/7/2018    states migraines are rare       Past Medical History  Disease Name Date Onset Notes   Ankle pain --  --    Arthritis --  --    B12 deficiency 02/22/2019 --    Bladder Disorder --  --    Cramps of lower extremity --  --    Folic acid deficiency 02/22/2019 --    Foot pain, left 03/20/2018 --    High cholesterol --  --    Hypertension --  --    Iron deficiency 02/22/2019 --    Kidney problem --  --    Reflux --  --    Ulcer --  --          Past Surgical History  Procedure Name Date Notes   Artificial Joints/Limbs --  --    Bladder sling surgery in female --  --    Hysterectomy --  --    Joint Surgery --  --    Knee replacement, left --  --    Neurostimulator. --  --          Medication List  Name Date Started Instructions   cyanocobalamin (vitamin B-12) 1,000 mcg/mL injection solution 04/28/2020 inject 0.1 milliliter (100 mcg) by intramuscular route once a month for 90 days   fluticasone propionate 50 mcg/actuation nasal spray,suspension 04/28/2020 inhale 2 sprays (100  mcg) in each nostril by intranasal route once daily for 90 days   folic acid 1 mg oral tablet 04/28/2020 take 1 tablet (1 mg) by oral route once daily for 90 days   metoprolol succinate 25 mg oral tablet extended release 24 hr 04/28/2020 take 1 tablet (25 mg) by oral route once daily for 90 days   Relpax 40 mg oral tablet 04/28/2020 take 1 tablet (40 mg) by oral route ; if headache returns, the dose may be repeated after 2 hours   Topamax 50 mg oral tablet 04/28/2020 one tablet po bid   Tums Ultra 1,177 mg oral tablet,chewable  chew 3-4 tablets by oral route daily as needed   Vitamin C 500 mg oral tablet extended release 04/28/2020 take 1 tablet by oral route daily for 90 days   Vitamin D3 125 mcg (5,000 unit) oral tablet 04/28/2020 take 1 tablet by oral route daily for 90 days         Allergy List  Allergen Name Date Reaction Notes   NO KNOWN DRUG ALLERGIES --  --  --          Family Medical History  Disease Name Relative/Age Notes   Cancer, Unspecified Daughter/  Sister/   Sister; Daughter   *No Known Family History  --    Family history of certain chronic disabling diseases; arthritis Daughter/   Daughter         Social History  Finding Status Start/Stop Quantity Notes   Alcohol Never --/-- --  06/19/2020 - occasionally    Alcohol Use Current some day --/-- --  occasionally drinks, has been drinking for 6-10 years  11/14/2017 - occasionally drinks, has been drinking for 6-10 years   Claustophobic Unknown --/-- --  yes   lives alone --  --/-- --  --    lives with spouse --  --/-- --  --    Recreational Drug Use Never --/-- --  no   Tobacco Never --/-- --  never smoker    --  --/-- --  --    Working --  --/-- --  --          Immunizations  NameDate Admin Mfg Trade Name Lot Number Route Inj VIS Given VIS Publication   Bphspvtml52/08/2020 NE Not Entered  NE NE 09/14/2020    Comments: PATIENT RECEIVED FLU VAC AT [facility] IN [place]   Tdap11/08/2018 SKB BOOSTRIX [id number] IM LD 11/08/2018 02/24/2015   Comments:  "Pt tolerated well         Review of Systems  · Constitutional  o Denies  o : fever, chills  · Eyes  o Denies  o : changes in vision  · HENT  o Admits  o : headaches  o Denies  o : ear pain, sore throat  · Cardiovascular  o Denies  o : chest Pain  · Respiratory  o Denies  o : frequent cough, shortness of breath  · Gastrointestinal  o Denies  o : nausea, vomiting, changes in bowel habits  · Genitourinary  o Denies  o : dysuria  · Neurologic  o Admits  o : headache  · Musculoskeletal  o Denies  o : joint pain, myalgias  · Allergic-Immunologic  o Admits  o : seasonal allergies      Vitals  Date Time BP Position Site L\R Cuff Size HR RR TEMP (F) WT  HT  BMI kg/m2 BSA m2 O2 Sat FR L/min FiO2 HC       07/02/2019 07:41 /86 Sitting    96 - R 18 98 209lbs 0oz 5'  4\" 35.87 2.07 98 %  21%    11/14/2019 02:57 /83 Sitting    79 - R 18 98.2 215lbs 2oz 5'  4\" 36.93 2.1 98 %  21%    06/19/2020 08:32 /79 Sitting    68 - R  98.4 202lbs 4oz 5'  3\" 35.83 2.02 99 %  21%    01/08/2021 11:40 /81 Sitting    89 - R 18 99.1 198lbs 6oz 5'  3\" 35.14 2 96 %      01/08/2021 12:39 /81 Sitting                       Physical Examination  · Constitutional  o Appearance  o : well-nourished, in no acute distress  · Eyes  o Conjunctivae  o : conjunctivae normal  o Sclerae  o : sclerae white  o Pupils and Irises  o : pupils equal and round  o Eyelids/Ocular Adnexae  o : eyelid appearance normal  · Ears, Nose, Mouth and Throat  o Ears  o :   § External Ears  § : external auditory canal appearance within normal limits  § Otoscopic Examination  § : tympanic membrane appearance within normal limits bilaterally  o Nose  o :   § External Nose  § : appearance normal  § Intranasal Exam  § : mucosa within normal limits  § Nasopharynx  § : no discharge present  o Oral Cavity  o :   § Oral Mucosa  § : oral mucosa normal  o Throat  o :   § Oropharynx  § : no inflammation or lesions present, tonsils within normal " limits  · Neck  o Inspection/Palpation  o : normal appearance, trachea midline  o Thyroid  o : gland size normal, nontender  · Respiratory  o Respiratory Effort  o : breathing unlabored  o Auscultation of Lungs  o : normal breath sounds throughout inspiration and expiration  · Cardiovascular  o Heart  o :   § Auscultation of Heart  § : regular rate and rhythm, no murmurs  o Peripheral Vascular System  o :   § Carotid Arteries  § : no bruits present  § Extremities  § : no clubbing or edema  · Gastrointestinal  o Abdominal Examination  o : abdomen nontender to palpation, bowel sounds present   · Lymphatic  o Neck  o : no lymphadenopathy present  · Skin and Subcutaneous Tissue  o General Inspection  o : pink, warm, dry   · Neurologic  o Mental Status Examination  o :   § Orientation  § : grossly oriented to person, place and time  o Gait and Station  o : normal gait, able to stand without difficulty  · Psychiatric  o Judgement and Insight  o : judgment and insight intact  o Mood and Affect  o : mood normal, affect appropriate          Results  · In-Office Procedures  o Lab procedure  § IOP - Urinalysis without Microscopy (Clinitek) [facility] ([address])   § Color Ur: Yellow   § Clarity Ur: Clear   § Glucose Ur Ql Strip: Negative   § Bilirub Ur Ql Strip: Negative   § Ketones Ur Ql Strip: Negative   § Sp Gr Ur Qn: 1.010   § Hgb Ur Ql Strip: Trace-Intact   § pH Ur-LsCnc: 5.5   § Prot Ur Ql Strip: Negative   § Urobilinogen Ur Strip-mCnc: 0.2 E.U./dL   § Nitrite Ur Ql Strip: Negative   § WBC Est Ur Ql Strip: Negative       Assessment  · Screening for depression     V79.0/Z13.89  · Allergic rhinitis due to allergen     477.9/J30.9  currently controlled   · Essential hypertension     401.9/I10  elevated in clinic, will recheck manually   · GERD (gastroesophageal reflux disease)     530.81/K21.9  currently controlled   · Hyperlipidemia     272.4/E78.5  will obtain lipid panel   · Migraines     346.90/G43.909  currently controlled    · Abnormal urinalysis     791.9/R82.90      Plan  · Orders  o ACO-18: Negative screen for clinical depression using a standardized tool () - V79.0/Z13.89 - 01/08/2021  o ACO-14: Influenza immunization administered or previously received [facility] () - - 01/08/2021  o ACO-19: Colorectal cancer screening results documented and reviewed (3017F) - - 01/08/2021  o ACO-39: Current medications updated and reviewed (, 1159F) - - 01/08/2021  o Urine Culture (Clean Catch) [facility] ([address]645) - 791.9/R82.90 - 01/08/2021  · Medications  o cyanocobalamin (vitamin B-12) 1,000 mcg/mL injection solution   SIG: inject 0.1 milliliter (100 mcg) by intramuscular route once a month for 90 days   DISP: (3) Millimeter with 1 refills  Refilled on 01/08/2021     o fluticasone propionate 50 mcg/actuation nasal spray,suspension   SIG: inhale 2 sprays (100 mcg) in each nostril by intranasal route once daily for 90 days   DISP: (3) Inhaler with 1 refills  Refilled on 01/08/2021     o folic acid 1 mg oral tablet   SIG: take 1 tablet (1 mg) by oral route once daily for 90 days   DISP: (90) Tablet with 1 refills  Refilled on 01/08/2021     o metoprolol succinate 25 mg oral tablet extended release 24 hr   SIG: take 1 tablet (25 mg) by oral route once daily for 90 days   DISP: (90) Tablet with 1 refills  Refilled on 01/08/2021     o Relpax 40 mg oral tablet   SIG: take 1 tablet (40 mg) by oral route ; if headache returns, the dose may be repeated after 2 hours for 30 days   DISP: (10) Tablet with 1 refills  Refilled on 01/08/2021     o Topamax 50 mg oral tablet   SIG: one tablet po bid   DISP: (180) Tablet with 1 refills  Refilled on 01/08/2021     o Vitamin C 500 mg oral tablet extended release   SIG: take 1 tablet by oral route daily for 90 days   DISP: (90) Tablet with 1 refills  Refilled on 01/08/2021     o Vitamin D3 125 mcg (5,000 unit) oral tablet   SIG: take 1 tablet by oral route daily for 90 days   DISP: (90) Tablet with 1  refills  Refilled on 01/08/2021     o Medications have been Reconciled  o Transition of Care or Provider Policy  · Instructions  o Depression Screen completed and scanned into the EMR under the designated folder within the patient's documents.  o Today's PHQ-9 result is 0  o Patient was educated/instructed on their diagnosis, treatment and medications prior to discharge from the clinic today.  · Disposition  o Follow Up in Office in 6 months or sooner if needed  o will contact with diagnostics results            Electronically Signed by: DORCAS Shine -Author on January 12, 2021 07:08:22 AM

## 2021-05-15 VITALS
HEIGHT: 63 IN | BODY MASS INDEX: 35.84 KG/M2 | WEIGHT: 202.25 LBS | OXYGEN SATURATION: 99 % | TEMPERATURE: 98.4 F | SYSTOLIC BLOOD PRESSURE: 129 MMHG | DIASTOLIC BLOOD PRESSURE: 79 MMHG | HEART RATE: 68 BPM

## 2021-05-15 VITALS
DIASTOLIC BLOOD PRESSURE: 86 MMHG | HEART RATE: 96 BPM | HEIGHT: 64 IN | SYSTOLIC BLOOD PRESSURE: 126 MMHG | OXYGEN SATURATION: 98 % | TEMPERATURE: 98 F | RESPIRATION RATE: 18 BRPM | WEIGHT: 209 LBS | BODY MASS INDEX: 35.68 KG/M2

## 2021-05-15 VITALS
DIASTOLIC BLOOD PRESSURE: 81 MMHG | BODY MASS INDEX: 36.09 KG/M2 | HEIGHT: 64 IN | WEIGHT: 211.37 LBS | HEART RATE: 88 BPM | RESPIRATION RATE: 18 BRPM | TEMPERATURE: 97.9 F | OXYGEN SATURATION: 99 % | SYSTOLIC BLOOD PRESSURE: 132 MMHG

## 2021-05-15 VITALS
TEMPERATURE: 98.2 F | RESPIRATION RATE: 18 BRPM | HEART RATE: 79 BPM | BODY MASS INDEX: 36.73 KG/M2 | DIASTOLIC BLOOD PRESSURE: 83 MMHG | SYSTOLIC BLOOD PRESSURE: 138 MMHG | OXYGEN SATURATION: 98 % | HEIGHT: 64 IN | WEIGHT: 215.12 LBS

## 2021-05-15 VITALS
SYSTOLIC BLOOD PRESSURE: 138 MMHG | TEMPERATURE: 97.6 F | OXYGEN SATURATION: 100 % | BODY MASS INDEX: 35 KG/M2 | WEIGHT: 205 LBS | HEIGHT: 64 IN | DIASTOLIC BLOOD PRESSURE: 78 MMHG | HEART RATE: 93 BPM | RESPIRATION RATE: 18 BRPM

## 2021-05-16 VITALS
OXYGEN SATURATION: 100 % | HEART RATE: 74 BPM | BODY MASS INDEX: 35 KG/M2 | TEMPERATURE: 98.5 F | WEIGHT: 205 LBS | RESPIRATION RATE: 18 BRPM | HEIGHT: 64 IN | DIASTOLIC BLOOD PRESSURE: 88 MMHG | SYSTOLIC BLOOD PRESSURE: 132 MMHG

## 2021-05-16 VITALS
TEMPERATURE: 98.6 F | BODY MASS INDEX: 34.87 KG/M2 | HEART RATE: 94 BPM | OXYGEN SATURATION: 97 % | SYSTOLIC BLOOD PRESSURE: 129 MMHG | WEIGHT: 204.25 LBS | HEIGHT: 64 IN | DIASTOLIC BLOOD PRESSURE: 88 MMHG

## 2021-05-16 VITALS
HEART RATE: 93 BPM | BODY MASS INDEX: 33.8 KG/M2 | WEIGHT: 198 LBS | DIASTOLIC BLOOD PRESSURE: 83 MMHG | SYSTOLIC BLOOD PRESSURE: 124 MMHG | TEMPERATURE: 98.7 F | HEIGHT: 64 IN | OXYGEN SATURATION: 98 % | RESPIRATION RATE: 16 BRPM

## 2021-05-16 VITALS — OXYGEN SATURATION: 98 % | HEART RATE: 102 BPM | WEIGHT: 223 LBS | HEIGHT: 64 IN | BODY MASS INDEX: 38.07 KG/M2

## 2021-05-16 VITALS — BODY MASS INDEX: 35.51 KG/M2 | HEIGHT: 64 IN | HEART RATE: 86 BPM | OXYGEN SATURATION: 97 % | WEIGHT: 208 LBS

## 2021-05-16 VITALS — HEIGHT: 63 IN | HEART RATE: 103 BPM | WEIGHT: 222 LBS | BODY MASS INDEX: 39.34 KG/M2 | OXYGEN SATURATION: 98 %

## 2021-05-16 VITALS — HEIGHT: 64 IN | WEIGHT: 198 LBS | BODY MASS INDEX: 33.8 KG/M2 | OXYGEN SATURATION: 98 % | HEART RATE: 100 BPM

## 2021-05-16 VITALS
DIASTOLIC BLOOD PRESSURE: 85 MMHG | RESPIRATION RATE: 18 BRPM | HEIGHT: 64 IN | WEIGHT: 251 LBS | SYSTOLIC BLOOD PRESSURE: 130 MMHG | HEART RATE: 96 BPM | OXYGEN SATURATION: 99 % | TEMPERATURE: 98 F | BODY MASS INDEX: 42.85 KG/M2

## 2021-05-16 VITALS — WEIGHT: 222 LBS | HEART RATE: 107 BPM | OXYGEN SATURATION: 99 % | BODY MASS INDEX: 37.9 KG/M2 | HEIGHT: 64 IN

## 2021-05-16 VITALS — RESPIRATION RATE: 16 BRPM | HEIGHT: 64 IN | WEIGHT: 198.44 LBS | BODY MASS INDEX: 33.88 KG/M2

## 2021-05-16 VITALS — BODY MASS INDEX: 35 KG/M2 | HEIGHT: 64 IN | WEIGHT: 205 LBS | OXYGEN SATURATION: 97 % | HEART RATE: 88 BPM

## 2021-05-16 VITALS — RESPIRATION RATE: 14 BRPM | WEIGHT: 213.25 LBS | HEIGHT: 64 IN | BODY MASS INDEX: 36.41 KG/M2

## 2021-05-16 VITALS — BODY MASS INDEX: 35 KG/M2 | WEIGHT: 205 LBS | HEIGHT: 64 IN

## 2021-05-20 ENCOUNTER — HOSPITAL ENCOUNTER (OUTPATIENT)
Dept: OTHER | Facility: HOSPITAL | Age: 56
Discharge: HOME | End: 2021-05-20

## 2021-05-22 LAB — HBV SURFACE AB SER QL: REACTIVE

## 2021-07-08 ENCOUNTER — OFFICE VISIT (OUTPATIENT)
Dept: FAMILY MEDICINE CLINIC | Facility: CLINIC | Age: 56
End: 2021-07-08

## 2021-07-08 VITALS
BODY MASS INDEX: 35.79 KG/M2 | HEART RATE: 88 BPM | OXYGEN SATURATION: 99 % | SYSTOLIC BLOOD PRESSURE: 139 MMHG | TEMPERATURE: 96.7 F | DIASTOLIC BLOOD PRESSURE: 76 MMHG | WEIGHT: 202 LBS | HEIGHT: 63 IN

## 2021-07-08 DIAGNOSIS — J01.00 ACUTE NON-RECURRENT MAXILLARY SINUSITIS: ICD-10-CM

## 2021-07-08 DIAGNOSIS — E53.8 B12 DEFICIENCY: ICD-10-CM

## 2021-07-08 DIAGNOSIS — G43.909 MIGRAINE WITHOUT STATUS MIGRAINOSUS, NOT INTRACTABLE, UNSPECIFIED MIGRAINE TYPE: ICD-10-CM

## 2021-07-08 DIAGNOSIS — K21.9 GASTROESOPHAGEAL REFLUX DISEASE, UNSPECIFIED WHETHER ESOPHAGITIS PRESENT: ICD-10-CM

## 2021-07-08 DIAGNOSIS — Z12.31 SCREENING MAMMOGRAM, ENCOUNTER FOR: ICD-10-CM

## 2021-07-08 DIAGNOSIS — E78.5 HYPERLIPIDEMIA, UNSPECIFIED HYPERLIPIDEMIA TYPE: ICD-10-CM

## 2021-07-08 DIAGNOSIS — J30.9 ALLERGIC RHINITIS, UNSPECIFIED SEASONALITY, UNSPECIFIED TRIGGER: ICD-10-CM

## 2021-07-08 DIAGNOSIS — I10 HYPERTENSION, UNSPECIFIED TYPE: Primary | ICD-10-CM

## 2021-07-08 PROBLEM — N32.9 BLADDER DISORDER: Status: ACTIVE | Noted: 2021-07-08

## 2021-07-08 PROBLEM — E78.00 HIGH CHOLESTEROL: Status: ACTIVE | Noted: 2021-07-08

## 2021-07-08 PROBLEM — M19.90 ARTHRITIS: Status: ACTIVE | Noted: 2021-07-08

## 2021-07-08 PROBLEM — M25.579 ANKLE PAIN: Status: ACTIVE | Noted: 2021-07-08

## 2021-07-08 PROBLEM — N28.9 KIDNEY PROBLEM: Status: ACTIVE | Noted: 2021-07-08

## 2021-07-08 PROBLEM — IMO0002 ULCERATIVE LESION: Status: ACTIVE | Noted: 2021-07-08

## 2021-07-08 PROBLEM — R25.2 CRAMPS OF LOWER EXTREMITY: Status: ACTIVE | Noted: 2021-07-08

## 2021-07-08 PROBLEM — M79.672 FOOT PAIN, LEFT: Status: ACTIVE | Noted: 2018-03-20

## 2021-07-08 PROBLEM — E61.1 IRON DEFICIENCY: Status: ACTIVE | Noted: 2019-02-22

## 2021-07-08 LAB
ALBUMIN SERPL-MCNC: 4.7 G/DL (ref 3.5–5.2)
ALBUMIN/GLOB SERPL: 1.8 G/DL
ALP SERPL-CCNC: 94 U/L (ref 39–117)
ALT SERPL W P-5'-P-CCNC: 17 U/L (ref 1–33)
ANION GAP SERPL CALCULATED.3IONS-SCNC: 11.3 MMOL/L (ref 5–15)
AST SERPL-CCNC: 21 U/L (ref 1–32)
BASOPHILS # BLD AUTO: 0.06 10*3/MM3 (ref 0–0.2)
BASOPHILS NFR BLD AUTO: 1.2 % (ref 0–1.5)
BILIRUB SERPL-MCNC: 0.5 MG/DL (ref 0–1.2)
BUN SERPL-MCNC: 13 MG/DL (ref 6–20)
BUN/CREAT SERPL: 16.9 (ref 7–25)
CALCIUM SPEC-SCNC: 9.5 MG/DL (ref 8.6–10.5)
CHLORIDE SERPL-SCNC: 104 MMOL/L (ref 98–107)
CHOLEST SERPL-MCNC: 233 MG/DL (ref 0–200)
CO2 SERPL-SCNC: 25.7 MMOL/L (ref 22–29)
CREAT SERPL-MCNC: 0.77 MG/DL (ref 0.57–1)
DEPRECATED RDW RBC AUTO: 41.5 FL (ref 37–54)
EOSINOPHIL # BLD AUTO: 0.08 10*3/MM3 (ref 0–0.4)
EOSINOPHIL NFR BLD AUTO: 1.6 % (ref 0.3–6.2)
ERYTHROCYTE [DISTWIDTH] IN BLOOD BY AUTOMATED COUNT: 12.7 % (ref 12.3–15.4)
FOLATE SERPL-MCNC: 13.4 NG/ML (ref 4.78–24.2)
GFR SERPL CREATININE-BSD FRML MDRD: 78 ML/MIN/1.73
GLOBULIN UR ELPH-MCNC: 2.6 GM/DL
GLUCOSE SERPL-MCNC: 89 MG/DL (ref 65–99)
HCT VFR BLD AUTO: 41.7 % (ref 34–46.6)
HDLC SERPL-MCNC: 79 MG/DL (ref 40–60)
HGB BLD-MCNC: 13.6 G/DL (ref 12–15.9)
IMM GRANULOCYTES # BLD AUTO: 0.01 10*3/MM3 (ref 0–0.05)
IMM GRANULOCYTES NFR BLD AUTO: 0.2 % (ref 0–0.5)
LDLC SERPL CALC-MCNC: 142 MG/DL (ref 0–100)
LDLC/HDLC SERPL: 1.77 {RATIO}
LYMPHOCYTES # BLD AUTO: 1.7 10*3/MM3 (ref 0.7–3.1)
LYMPHOCYTES NFR BLD AUTO: 32.9 % (ref 19.6–45.3)
MCH RBC QN AUTO: 29.1 PG (ref 26.6–33)
MCHC RBC AUTO-ENTMCNC: 32.6 G/DL (ref 31.5–35.7)
MCV RBC AUTO: 89.1 FL (ref 79–97)
MONOCYTES # BLD AUTO: 0.7 10*3/MM3 (ref 0.1–0.9)
MONOCYTES NFR BLD AUTO: 13.6 % (ref 5–12)
NEUTROPHILS NFR BLD AUTO: 2.61 10*3/MM3 (ref 1.7–7)
NEUTROPHILS NFR BLD AUTO: 50.5 % (ref 42.7–76)
NRBC BLD AUTO-RTO: 0 /100 WBC (ref 0–0.2)
PLATELET # BLD AUTO: 262 10*3/MM3 (ref 140–450)
PMV BLD AUTO: 10.6 FL (ref 6–12)
POTASSIUM SERPL-SCNC: 4.2 MMOL/L (ref 3.5–5.2)
PROT SERPL-MCNC: 7.3 G/DL (ref 6–8.5)
RBC # BLD AUTO: 4.68 10*6/MM3 (ref 3.77–5.28)
SODIUM SERPL-SCNC: 141 MMOL/L (ref 136–145)
TRIGL SERPL-MCNC: 70 MG/DL (ref 0–150)
TSH SERPL DL<=0.05 MIU/L-ACNC: 2.05 UIU/ML (ref 0.27–4.2)
VIT B12 BLD-MCNC: 490 PG/ML (ref 211–946)
VLDLC SERPL-MCNC: 12 MG/DL (ref 5–40)
WBC # BLD AUTO: 5.16 10*3/MM3 (ref 3.4–10.8)

## 2021-07-08 PROCEDURE — 84443 ASSAY THYROID STIM HORMONE: CPT | Performed by: NURSE PRACTITIONER

## 2021-07-08 PROCEDURE — 96372 THER/PROPH/DIAG INJ SC/IM: CPT | Performed by: NURSE PRACTITIONER

## 2021-07-08 PROCEDURE — 82607 VITAMIN B-12: CPT | Performed by: NURSE PRACTITIONER

## 2021-07-08 PROCEDURE — 80053 COMPREHEN METABOLIC PANEL: CPT | Performed by: NURSE PRACTITIONER

## 2021-07-08 PROCEDURE — 85025 COMPLETE CBC W/AUTO DIFF WBC: CPT | Performed by: NURSE PRACTITIONER

## 2021-07-08 PROCEDURE — 99214 OFFICE O/P EST MOD 30 MIN: CPT | Performed by: NURSE PRACTITIONER

## 2021-07-08 PROCEDURE — 82746 ASSAY OF FOLIC ACID SERUM: CPT | Performed by: NURSE PRACTITIONER

## 2021-07-08 PROCEDURE — 80061 LIPID PANEL: CPT | Performed by: NURSE PRACTITIONER

## 2021-07-08 RX ORDER — CALCIUM CARBONATE 1177 MG/1
BAR, CHEWABLE ORAL
COMMUNITY
End: 2021-09-17

## 2021-07-08 RX ORDER — CYANOCOBALAMIN 1000 UG/ML
INJECTION, SOLUTION INTRAMUSCULAR; SUBCUTANEOUS
COMMUNITY
Start: 2021-04-21 | End: 2021-07-08 | Stop reason: SDUPTHER

## 2021-07-08 RX ORDER — METOPROLOL SUCCINATE 25 MG/1
25 TABLET, EXTENDED RELEASE ORAL DAILY
Qty: 90 TABLET | Refills: 1 | Status: SHIPPED | OUTPATIENT
Start: 2021-07-08 | End: 2022-12-16

## 2021-07-08 RX ORDER — FLUTICASONE PROPIONATE 50 MCG
2 SPRAY, SUSPENSION (ML) NASAL DAILY
Qty: 16 G | Refills: 1 | Status: SHIPPED | OUTPATIENT
Start: 2021-07-08

## 2021-07-08 RX ORDER — METOPROLOL SUCCINATE 25 MG/1
25 TABLET, EXTENDED RELEASE ORAL DAILY
COMMUNITY
End: 2021-07-08 | Stop reason: SDUPTHER

## 2021-07-08 RX ORDER — FLUTICASONE PROPIONATE 50 MCG
2 SPRAY, SUSPENSION (ML) NASAL DAILY
COMMUNITY
End: 2021-07-08 | Stop reason: SDUPTHER

## 2021-07-08 RX ORDER — LORATADINE 10 MG/1
10 TABLET ORAL DAILY
Qty: 90 TABLET | Refills: 1 | Status: SHIPPED | OUTPATIENT
Start: 2021-07-08 | End: 2022-04-06

## 2021-07-08 RX ORDER — CYANOCOBALAMIN 1000 UG/ML
1000 INJECTION, SOLUTION INTRAMUSCULAR; SUBCUTANEOUS
Qty: 1 ML | Refills: 5 | Status: SHIPPED | OUTPATIENT
Start: 2021-07-08 | End: 2022-09-01 | Stop reason: SDUPTHER

## 2021-07-08 RX ORDER — AMOXICILLIN AND CLAVULANATE POTASSIUM 875; 125 MG/1; MG/1
1 TABLET, FILM COATED ORAL 2 TIMES DAILY
Qty: 20 TABLET | Refills: 0 | Status: SHIPPED | OUTPATIENT
Start: 2021-07-08 | End: 2021-07-31

## 2021-07-08 RX ORDER — CYANOCOBALAMIN 1000 UG/ML
1000 INJECTION, SOLUTION INTRAMUSCULAR; SUBCUTANEOUS ONCE
COMMUNITY
End: 2021-07-08 | Stop reason: SDUPTHER

## 2021-07-08 RX ORDER — METHYLPREDNISOLONE ACETATE 80 MG/ML
80 INJECTION, SUSPENSION INTRA-ARTICULAR; INTRALESIONAL; INTRAMUSCULAR; SOFT TISSUE ONCE
Status: COMPLETED | OUTPATIENT
Start: 2021-07-08 | End: 2021-07-08

## 2021-07-08 RX ORDER — ACETAMINOPHEN 160 MG
2000 TABLET,DISINTEGRATING ORAL DAILY
Qty: 90 CAPSULE | Refills: 1 | Status: SHIPPED | OUTPATIENT
Start: 2021-07-08 | End: 2023-01-16 | Stop reason: SDUPTHER

## 2021-07-08 RX ORDER — OMEPRAZOLE 20 MG/1
TABLET, DELAYED RELEASE ORAL
COMMUNITY
End: 2021-07-08 | Stop reason: SDUPTHER

## 2021-07-08 RX ORDER — OMEPRAZOLE 20 MG/1
20 CAPSULE, DELAYED RELEASE ORAL DAILY
Qty: 90 CAPSULE | Refills: 1 | Status: SHIPPED | OUTPATIENT
Start: 2021-07-08 | End: 2021-09-17

## 2021-07-08 RX ORDER — FLUCONAZOLE 150 MG/1
150 TABLET ORAL ONCE
Qty: 1 TABLET | Refills: 0 | Status: SHIPPED | OUTPATIENT
Start: 2021-07-08 | End: 2021-07-22

## 2021-07-08 RX ORDER — MONTELUKAST SODIUM 10 MG/1
10 TABLET ORAL NIGHTLY
Qty: 90 TABLET | Refills: 1 | Status: SHIPPED | OUTPATIENT
Start: 2021-07-08 | End: 2022-04-06 | Stop reason: SDUPTHER

## 2021-07-08 RX ORDER — TOPIRAMATE 50 MG/1
50 TABLET, FILM COATED ORAL 2 TIMES DAILY
Qty: 90 TABLET | Refills: 1 | Status: SHIPPED | OUTPATIENT
Start: 2021-07-08 | End: 2022-01-05

## 2021-07-08 RX ORDER — TOPIRAMATE 50 MG/1
50 TABLET, FILM COATED ORAL 2 TIMES DAILY
COMMUNITY
End: 2021-07-08 | Stop reason: SDUPTHER

## 2021-07-08 RX ADMIN — METHYLPREDNISOLONE ACETATE 80 MG: 80 INJECTION, SUSPENSION INTRA-ARTICULAR; INTRALESIONAL; INTRAMUSCULAR; SOFT TISSUE at 09:07

## 2021-07-08 NOTE — PROGRESS NOTES
Follow Up Office Visit      Patient Name: Pam Cade  : 1965   MRN: [id number]     Chief Complaint:    Chief Complaint   Patient presents with   • Follow-up   • Hypertension   • Hyperlipidemia   • Heartburn   • Allergic Rhinitis       History of Present Illness: Pam Cade is a 56 y.o. female who is here today to follow up for   F/U-HTN,Hyperlipidemia,GERD,allergies,migraines    C/O-possible sinus infection since over the weekend, drainage, pain, ear pressure     Pap-  Mammogram-2018  Colonoscopy 2018    Migraines have been rare    Subjective      Review of Systems:   Review of Systems   Constitutional: Negative for fever.   HENT: Positive for ear pain, postnasal drip, rhinorrhea, sinus pressure, sinus pain and sore throat.    Eyes: Positive for itching.   Respiratory: Negative for cough and shortness of breath.    Cardiovascular: Negative for chest pain and leg swelling.   Gastrointestinal: Negative for abdominal pain, constipation, diarrhea, nausea and vomiting.   Genitourinary: Negative for dysuria.   Musculoskeletal: Negative for myalgias.   Skin: Negative for rash.   Allergic/Immunologic: Positive for environmental allergies.   Neurological: Negative for dizziness and headaches.        Past Medical History:   Past Medical History:   Diagnosis Date   • Allergic rhinitis due to allergen 2021   • Ankle pain    • Arthritis    • B12 deficiency 2019   • Bladder disorder    • Condition not found     Ulcer   • Cramps of lower extremity    • Folic acid deficiency 2019   • Foot pain, left 2018   • Gastroesophageal reflux    • High cholesterol    • Hypertension    • Iron deficiency 2019   • Kidney problem        Past Surgical History:   Past Surgical History:   Procedure Laterality Date   • HYSTERECTOMY     • INCONTINENCE SURGERY     • OTHER SURGICAL HISTORY      Artificial Joints/Limbs   • OTHER SURGICAL HISTORY      Joint surgery   • REVISION / REMOVAL NEUROSTIMULATOR      • TOTAL KNEE ARTHROPLASTY Left        Family History:   Family History   Problem Relation Age of Onset   • Cancer Sister    • Cancer Daughter    • Arthritis Daughter        Social History:   Social History     Socioeconomic History   • Marital status:      Spouse name: Not on file   • Number of children: Not on file   • Years of education: Not on file   • Highest education level: Not on file   Tobacco Use   • Smoking status: Never Smoker   • Smokeless tobacco: Never Used   Substance and Sexual Activity   • Alcohol use: Yes     Comment: Current some day occasionally drinks, has been drinking for 6-10 years   • Drug use: Never       Medications:     Current Outpatient Medications:   •  Calcium Carbonate Antacid (Tums Chewy Delights) 1177 MG chewable tablet, Tums Ultra 1,177 mg oral tablet,chewable chew 3-4 tablets by oral route daily as needed   Active, Disp: , Rfl:   •  cyanocobalamin 1000 MCG/ML injection, Inject 1 mL into the appropriate muscle as directed by prescriber 1 (One) Time for 1 dose., Disp: 1 mL, Rfl: 5  •  Diclofenac Sodium (VOLTAREN) 1 % gel gel, Apply 4 g topically to the appropriate area as directed 4 (Four) Times a Day As Needed (cold sores)., Disp: 1 g, Rfl: 1  •  fluticasone (FLONASE) 50 MCG/ACT nasal spray, 2 sprays into the nostril(s) as directed by provider Daily., Disp: 1 g, Rfl: 1  •  metoprolol succinate XL (TOPROL-XL) 25 MG 24 hr tablet, Take 1 tablet by mouth Daily., Disp: 90 tablet, Rfl: 1  •  omeprazole OTC (PrilOSEC OTC) 20 MG EC tablet, Take 1 tablet by mouth Daily., Disp: 90 tablet, Rfl: 1  •  topiramate (TOPAMAX) 50 MG tablet, Take 1 tablet by mouth 2 (Two) Times a Day., Disp: 90 tablet, Rfl: 1  •  amoxicillin-clavulanate (AUGMENTIN) 875-125 MG per tablet, Take 1 tablet by mouth 2 (Two) Times a Day for 10 days., Disp: 20 tablet, Rfl: 0  •  Cholecalciferol 50 MCG (2000 UT) tablet, Take 2,000 Units by mouth Daily., Disp: 90 each, Rfl: 1  •  fluconazole (Diflucan) 150 MG  "tablet, Take 1 tablet by mouth 1 (One) Time for 1 dose., Disp: 1 tablet, Rfl: 0  •  loratadine (CLARITIN) 10 MG tablet, Take 1 tablet by mouth Daily., Disp: 90 tablet, Rfl: 1  •  montelukast (SINGULAIR) 10 MG tablet, Take 1 tablet by mouth Every Night., Disp: 90 tablet, Rfl: 1    Current Facility-Administered Medications:   •  methylPREDNISolone acetate (DEPO-medrol) injection 80 mg, 80 mg, Intramuscular, Once, Colasanti, Keeshaalis Mindi, APRN    Allergies:   Not on File        PHQ-2 Total Score: 0   PHQ-9 Total Score: 0     Objective     Physical Exam:  Vital Signs:   Vitals:    07/08/21 0835   BP: 139/76   Pulse: 88   Temp: 96.7 °F (35.9 °C)   SpO2: 99%   Weight: 91.6 kg (202 lb)   Height: 160 cm (63\")     Body mass index is 35.78 kg/m².     Physical Exam  HENT:      Head: Normocephalic.      Right Ear: Tympanic membrane is injected.      Left Ear: Tympanic membrane is injected.      Nose:      Right Turbinates: Swollen.      Left Turbinates: Swollen.      Right Sinus: Maxillary sinus tenderness present.      Left Sinus: Maxillary sinus tenderness present.      Mouth/Throat:      Mouth: Mucous membranes are moist.      Pharynx: Posterior oropharyngeal erythema present.   Neck:      Vascular: No carotid bruit.   Cardiovascular:      Rate and Rhythm: Normal rate and regular rhythm.      Heart sounds: Normal heart sounds. No murmur heard.     Pulmonary:      Effort: Pulmonary effort is normal.      Breath sounds: Normal breath sounds.   Abdominal:      General: Bowel sounds are normal.      Palpations: Abdomen is soft.   Musculoskeletal:      Cervical back: Neck supple.      Right lower leg: No edema.      Left lower leg: No edema.   Skin:     General: Skin is warm and dry.      Capillary Refill: Capillary refill takes less than 2 seconds.   Neurological:      Mental Status: She is alert and oriented to person, place, and time.   Psychiatric:         Mood and Affect: Mood normal.         Behavior: Behavior normal. "             Assessment / Plan      Assessment/Plan:   Diagnoses and all orders for this visit:    1. Hypertension, unspecified type (Primary)  -     Comprehensive Metabolic Panel  -     CBC Auto Differential  -     TSH    2. Gastroesophageal reflux disease, unspecified whether esophagitis present    3. Hyperlipidemia, unspecified hyperlipidemia type  -     Lipid Panel    4. Migraine without status migrainosus, not intractable, unspecified migraine type    5. Allergic rhinitis, unspecified seasonality, unspecified trigger  -     methylPREDNISolone acetate (DEPO-medrol) injection 80 mg    6. B12 deficiency  -     Vitamin B12  -     Folate    7. Screening mammogram, encounter for  -     Mammo Screening Digital Tomosynthesis Bilateral With CAD; Future    8. Acute non-recurrent maxillary sinusitis    Other orders  -     metoprolol succinate XL (TOPROL-XL) 25 MG 24 hr tablet; Take 1 tablet by mouth Daily.  Dispense: 90 tablet; Refill: 1  -     omeprazole OTC (PrilOSEC OTC) 20 MG EC tablet; Take 1 tablet by mouth Daily.  Dispense: 90 tablet; Refill: 1  -     topiramate (TOPAMAX) 50 MG tablet; Take 1 tablet by mouth 2 (Two) Times a Day.  Dispense: 90 tablet; Refill: 1  -     fluticasone (FLONASE) 50 MCG/ACT nasal spray; 2 sprays into the nostril(s) as directed by provider Daily.  Dispense: 1 g; Refill: 1  -     Diclofenac Sodium (VOLTAREN) 1 % gel gel; Apply 4 g topically to the appropriate area as directed 4 (Four) Times a Day As Needed (cold sores).  Dispense: 1 g; Refill: 1  -     cyanocobalamin 1000 MCG/ML injection; Inject 1 mL into the appropriate muscle as directed by prescriber 1 (One) Time for 1 dose.  Dispense: 1 mL; Refill: 5  -     Cholecalciferol 50 MCG (2000 UT) tablet; Take 2,000 Units by mouth Daily.  Dispense: 90 each; Refill: 1  -     loratadine (CLARITIN) 10 MG tablet; Take 1 tablet by mouth Daily.  Dispense: 90 tablet; Refill: 1  -     montelukast (SINGULAIR) 10 MG tablet; Take 1 tablet by mouth Every  "Night.  Dispense: 90 tablet; Refill: 1  -     amoxicillin-clavulanate (AUGMENTIN) 875-125 MG per tablet; Take 1 tablet by mouth 2 (Two) Times a Day for 10 days.  Dispense: 20 tablet; Refill: 0  -     fluconazole (Diflucan) 150 MG tablet; Take 1 tablet by mouth 1 (One) Time for 1 dose.  Dispense: 1 tablet; Refill: 0       htn currently controlled continue current medication  gerd currently controlled  Allergies uncontrolled, will add singulair   Acute sinusitis will treat if symptoms persist will obtain imaging  Migraines controlled at this time  b12 deficiency will obtain levels to monitor        Follow Up:   Return in about 6 months (around 1/8/2022).    Caleb Barnard, APRN    \"Please note that portions of this note were completed with a voice recognition program.\"    "

## 2021-08-27 ENCOUNTER — OFFICE VISIT (OUTPATIENT)
Dept: ORTHOPEDIC SURGERY | Facility: CLINIC | Age: 56
End: 2021-08-27

## 2021-08-27 VITALS — WEIGHT: 199.4 LBS | BODY MASS INDEX: 35.33 KG/M2 | HEIGHT: 63 IN | OXYGEN SATURATION: 98 % | HEART RATE: 100 BPM

## 2021-08-27 DIAGNOSIS — S80.02XA CONTUSION OF LEFT KNEE, INITIAL ENCOUNTER: ICD-10-CM

## 2021-08-27 DIAGNOSIS — M25.562 LEFT KNEE PAIN, UNSPECIFIED CHRONICITY: Primary | ICD-10-CM

## 2021-08-27 DIAGNOSIS — Z96.652 HISTORY OF TOTAL KNEE ARTHROPLASTY, LEFT: ICD-10-CM

## 2021-08-27 PROCEDURE — 99213 OFFICE O/P EST LOW 20 MIN: CPT | Performed by: ORTHOPAEDIC SURGERY

## 2021-08-27 NOTE — PROGRESS NOTES
"Chief Complaint  Initial Evaluation of the Left Knee     Subjective      Pam Cade presents to [facility] for an evaluation of left knee. Patient had fallen in April of this year that resulted in some knee pain. She states that knee pain has increasingly gotten worse overtime and failed to improve. She has a history of a left total knee performed in 2015. She wants to assure she did not do any damage to her left TKA. She states pain is all around her knee.     No Known Allergies     Social History     Socioeconomic History   • Marital status:      Spouse name: Not on file   • Number of children: Not on file   • Years of education: Not on file   • Highest education level: Not on file   Tobacco Use   • Smoking status: Never Smoker   • Smokeless tobacco: Never Used   Substance and Sexual Activity   • Alcohol use: Yes     Comment: Current some day occasionally drinks, has been drinking for 6-10 years   • Drug use: Never        Review of Systems     Objective   Vital Signs:   Pulse 100   Ht 160 cm (63\")   Wt 90.4 kg (199 lb 6.4 oz)   SpO2 98%   BMI 35.32 kg/m²       Physical Exam  Constitutional:       Appearance: Normal appearance. Patient is well-developed and normal weight.   HENT:      Head: Normocephalic.      Right Ear: Hearing and external ear normal.      Left Ear: Hearing and external ear normal.      Nose: Nose normal.   Eyes:      Conjunctiva/sclera: Conjunctivae normal.   Cardiovascular:      Rate and Rhythm: Normal rate.   Pulmonary:      Effort: Pulmonary effort is normal.      Breath sounds: No wheezing or rales.   Abdominal:      Palpations: Abdomen is soft.      Tenderness: There is no abdominal tenderness.   Musculoskeletal:      Cervical back: Normal range of motion.   Skin:     Findings: No rash.   Neurological:      Mental Status: Patient is alert and oriented to person, place, and time.   Psychiatric:         Mood and Affect: Mood and affect normal.        "  Judgment: Judgment normal.       Ortho Exam      LEFT KNEE: Well healed scars. Good strength to hamstrings, quadriceps, dorsiflexors and plantar flexors. Sensation grossly intact. Neurovascular intact. Calf supple, non-tender. No swelling, skin discoloration or atrophy. Full weight bearing. Non-antalgic gait. Well tracking patella. Non-tender medial and lateral joint line. Full extension and flexion. Stable to varus/valgus stress.       Procedures      Imaging Results (Most Recent)     Procedure Component Value Units Date/Time    XR Knee 3 View Left [[id number]] Resulted: 08/27/21 1016     Updated: 08/27/21 1016    Narrative:      X-Ray Report:  Left knee(s) X-Ray  Indication: Evaluation of left knee pain   AP, Lateral and Standing view(s)  Findings: Left tka shows no signs of wearing or loosening. Appropriate   alignment of the implant.   Prior studies available for comparison: no            Result Review :       X-Ray Report:  Left knee(s) X-Ray  Indication: Evaluation of left knee pain   AP, Lateral and Standing view(s)  Findings: Left tka shows no signs of wearing or loosening. Appropriate alignment of the implant.   Prior studies available for comparison: no          Assessment and Plan     DX: History of a left total knee arthroplasty  Left knee contusion     Discussed treatment plans and diagnosis with the patient. Patient prescribed Voltaren gel to rub on her left knee.     Call or return if worsening symptoms.    Follow Up     PRN.       Patient was given instructions and counseling regarding her condition or for health maintenance advice. Please see specific information pulled into the AVS if appropriate.     Scribed for Ghazala Terrell MD by Gardenia Guaman.  08/27/21   10:04 EDT

## 2021-09-01 ENCOUNTER — TELEPHONE (OUTPATIENT)
Dept: FAMILY MEDICINE CLINIC | Facility: CLINIC | Age: 56
End: 2021-09-01

## 2021-09-01 ENCOUNTER — OFFICE VISIT (OUTPATIENT)
Dept: FAMILY MEDICINE CLINIC | Facility: CLINIC | Age: 56
End: 2021-09-01

## 2021-09-01 VITALS
BODY MASS INDEX: 35.26 KG/M2 | OXYGEN SATURATION: 97 % | HEIGHT: 63 IN | DIASTOLIC BLOOD PRESSURE: 82 MMHG | SYSTOLIC BLOOD PRESSURE: 108 MMHG | HEART RATE: 89 BPM | WEIGHT: 199 LBS | TEMPERATURE: 97.3 F

## 2021-09-01 DIAGNOSIS — J30.9 ALLERGIC RHINITIS, UNSPECIFIED SEASONALITY, UNSPECIFIED TRIGGER: ICD-10-CM

## 2021-09-01 DIAGNOSIS — J02.9 SORE THROAT: ICD-10-CM

## 2021-09-01 DIAGNOSIS — R51.9 NONINTRACTABLE HEADACHE, UNSPECIFIED CHRONICITY PATTERN, UNSPECIFIED HEADACHE TYPE: Primary | ICD-10-CM

## 2021-09-01 PROBLEM — R39.15 URINARY URGENCY: Status: ACTIVE | Noted: 2018-07-23

## 2021-09-01 PROBLEM — R35.1 NOCTURIA: Status: ACTIVE | Noted: 2018-07-23

## 2021-09-01 PROBLEM — R35.0 INCREASED FREQUENCY OF URINATION: Status: ACTIVE | Noted: 2018-07-23

## 2021-09-01 LAB
EXPIRATION DATE: 0
INTERNAL CONTROL: NORMAL
Lab: 0
S PYO AG THROAT QL: NEGATIVE

## 2021-09-01 PROCEDURE — 87880 STREP A ASSAY W/OPTIC: CPT | Performed by: NURSE PRACTITIONER

## 2021-09-01 PROCEDURE — 99213 OFFICE O/P EST LOW 20 MIN: CPT | Performed by: NURSE PRACTITIONER

## 2021-09-01 PROCEDURE — U0003 INFECTIOUS AGENT DETECTION BY NUCLEIC ACID (DNA OR RNA); SEVERE ACUTE RESPIRATORY SYNDROME CORONAVIRUS 2 (SARS-COV-2) (CORONAVIRUS DISEASE [COVID-19]), AMPLIFIED PROBE TECHNIQUE, MAKING USE OF HIGH THROUGHPUT TECHNOLOGIES AS DESCRIBED BY CMS-2020-01-R: HCPCS | Performed by: NURSE PRACTITIONER

## 2021-09-01 RX ORDER — DEXAMETHASONE 6 MG/1
6 TABLET ORAL
Qty: 10 TABLET | Refills: 0 | Status: SHIPPED | OUTPATIENT
Start: 2021-09-01 | End: 2021-09-17 | Stop reason: ALTCHOICE

## 2021-09-01 RX ORDER — ASCORBIC ACID 250 MG
250 TABLET ORAL DAILY
COMMUNITY

## 2021-09-01 RX ORDER — DOXYCYCLINE HYCLATE 50 MG/1
324 CAPSULE, GELATIN COATED ORAL DAILY
COMMUNITY
End: 2021-09-17 | Stop reason: ALTCHOICE

## 2021-09-01 RX ORDER — FOLIC ACID 1 MG/1
1 TABLET ORAL DAILY
COMMUNITY
End: 2023-03-31 | Stop reason: SDUPTHER

## 2021-09-01 RX ORDER — LISINOPRIL 10 MG/1
10 TABLET ORAL DAILY
COMMUNITY
End: 2021-09-17

## 2021-09-01 NOTE — TELEPHONE ENCOUNTER
Caller: Pam Cade    Relationship to patient: Self    Best call back number: [phone]    Patient is needing: PATIENT CALLED STATING SHE HAS A SORE THROAT AND A HEADACHE AND IS NEEDING TO KNOW IF SHE CAN COME IN AND GET TESTED FOR COVID. 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 PATIENT WOULD LIKE A CALL BACK PLEASE ADVISE THANK YOU.       HUB STAFF UNABLE TO WARM TRANSFER

## 2021-09-01 NOTE — PROGRESS NOTES
Patient Name: Pam Cade  : 1965   MRN: [id number]     Chief Complaint:    Chief Complaint   Patient presents with   • Sore Throat       History of Present Illness: Pam Cade is a 56 y.o. female who is here today for     Headache,sore throat, for 2 day  No covid vaccine  Taking claritin, flonase and singulair   Working at the hospital with covid positive patients   Subjective      Review of Systems:   Review of Systems   Constitutional: Positive for fatigue. Negative for chills and fever.   HENT: Positive for postnasal drip, sinus pressure and sore throat. Negative for rhinorrhea.    Eyes: Positive for pain. Negative for itching.   Respiratory: Negative for cough.    Cardiovascular: Negative for chest pain.   Gastrointestinal: Negative for diarrhea, nausea and vomiting.   Genitourinary: Negative for dysuria.   Skin: Negative for rash.   Neurological: Positive for headaches. Negative for dizziness.        Past Medical History:   Past Medical History:   Diagnosis Date   • Allergic rhinitis due to allergen 2021   • Ankle pain    • Arthritis    • B12 deficiency 2019   • Bladder disorder    • Condition not found     Ulcer   • Cramps of lower extremity    • Folic acid deficiency 2019   • Foot pain, left 2018   • Gastroesophageal reflux    • High cholesterol    • Hypertension    • Iron deficiency 2019   • Kidney problem        Past Surgical History:   Past Surgical History:   Procedure Laterality Date   • HYSTERECTOMY     • INCONTINENCE SURGERY     • OTHER SURGICAL HISTORY      Artificial Joints/Limbs   • OTHER SURGICAL HISTORY      Joint surgery   • REVISION / REMOVAL NEUROSTIMULATOR     • TOTAL KNEE ARTHROPLASTY Left        Family History:   Family History   Problem Relation Age of Onset   • Cancer Sister    • Cancer Daughter    • Arthritis Daughter        Social History:   Social History     Socioeconomic History   • Marital status:      Spouse name: Not on file    • Number of children: Not on file   • Years of education: Not on file   • Highest education level: Not on file   Tobacco Use   • Smoking status: Never Smoker   • Smokeless tobacco: Never Used   Substance and Sexual Activity   • Alcohol use: Yes     Comment: Current some day occasionally drinks, has been drinking for 6-10 years   • Drug use: Never       Medications:     Current Outpatient Medications:   •  ascorbic acid (VITAMIN C) 250 MG tablet, Take 250 mg by mouth Daily., Disp: , Rfl:   •  Calcium Carbonate Antacid (Tums Chewy Delights) 1177 MG chewable tablet, Tums Ultra 1,177 mg oral tablet,chewable chew 3-4 tablets by oral route daily as needed   Active, Disp: , Rfl:   •  Cholecalciferol (Vitamin D3) 50 MCG (2000 UT) capsule, Take 1 capsule by mouth Daily., Disp: 90 capsule, Rfl: 1  •  cyanocobalamin 1000 MCG/ML injection, Inject 1 mL into the appropriate muscle as directed by prescriber Every 30 (Thirty) Days., Disp: 1 mL, Rfl: 5  •  dexamethasone (DECADRON) 6 MG tablet, Take 1 tablet by mouth Daily With Breakfast., Disp: 10 tablet, Rfl: 0  •  Diclofenac Sodium (VOLTAREN) 1 % gel gel, Apply 4 g topically to the appropriate area as directed 4 (Four) Times a Day As Needed (AS NEEDED)., Disp: 100 g, Rfl: 1  •  ferrous gluconate (FERGON) 324 MG tablet, Take 324 mg by mouth Daily., Disp: , Rfl:   •  fluticasone (FLONASE) 50 MCG/ACT nasal spray, Instill 2 sprays into the nostril(s) as directed by provider Daily., Disp: 16 g, Rfl: 1  •  folic acid (FOLVITE) 1 MG tablet, Take 1 mg by mouth Daily., Disp: , Rfl:   •  lisinopril (PRINIVIL,ZESTRIL) 10 MG tablet, Take 10 mg by mouth Daily., Disp: , Rfl:   •  loratadine (CLARITIN) 10 MG tablet, Take 1 tablet by mouth Daily., Disp: 90 tablet, Rfl: 1  •  metoprolol succinate XL (TOPROL-XL) 25 MG 24 hr tablet, Take 1 tablet by mouth Daily., Disp: 90 tablet, Rfl: 1  •  montelukast (SINGULAIR) 10 MG tablet, Take 1 tablet by mouth Every Night., Disp: 90 tablet, Rfl: 1  •   "omeprazole (priLOSEC) 20 MG capsule, Take 1 capsule by mouth Daily., Disp: 90 capsule, Rfl: 1  •  topiramate (TOPAMAX) 50 MG tablet, Take 1 tablet by mouth 2 (Two) Times a Day., Disp: 90 tablet, Rfl: 1    Allergies:   No Known Allergies      Objective     Physical Exam:  Vital Signs:   Vitals:    09/01/21 1557   BP: 108/82   Pulse: 89   Temp: 97.3 °F (36.3 °C)   SpO2: 97%   Weight: 90.3 kg (199 lb)   Height: 160 cm (63\")     Body mass index is 35.25 kg/m².     Physical Exam  HENT:      Right Ear: Tympanic membrane normal.      Left Ear: Tympanic membrane normal.      Nose: Congestion and rhinorrhea present.      Right Turbinates: Swollen.      Left Turbinates: Swollen.      Mouth/Throat:      Mouth: Mucous membranes are moist.   Eyes:      Conjunctiva/sclera:      Right eye: Right conjunctiva is injected.      Left eye: Left conjunctiva is injected.      Pupils: Pupils are equal, round, and reactive to light.   Cardiovascular:      Rate and Rhythm: Normal rate and regular rhythm.      Heart sounds: Normal heart sounds. No murmur heard.     Pulmonary:      Effort: Pulmonary effort is normal.      Breath sounds: Normal breath sounds.   Abdominal:      General: Bowel sounds are normal.      Palpations: Abdomen is soft.   Lymphadenopathy:      Cervical: No cervical adenopathy.   Skin:     General: Skin is warm and dry.      Capillary Refill: Capillary refill takes less than 2 seconds.   Neurological:      Mental Status: She is alert and oriented to person, place, and time.             Assessment / Plan      Assessment/Plan:   Diagnoses and all orders for this visit:    1. Nonintractable headache, unspecified chronicity pattern, unspecified headache type (Primary)  -     COVID-19,CEPHEID/RAGHAV/BDMAX,COR/SY/PAD/PARVEZ IN-HOUSE(OR EMERGENT/ADD-ON),NP SWAB IN TRANSPORT MEDIA 3-4 HR TAT, RT-PCR - Swab, Nasopharynx; Future  -     POCT rapid strep A  -     COVID-19,CEPHEID/RAGHAV/BDMAX,COR/SY/PAD/PARVEZ IN-HOUSE(OR " EMERGENT/ADD-ON),NP SWAB IN TRANSPORT MEDIA 3-4 HR TAT, RT-PCR - Swab, Nasopharynx    2. Sore throat  -     COVID-19,CEPHEID/RAGHAV/BDMAX,COR/SY/PAD/PARVEZ IN-HOUSE(OR EMERGENT/ADD-ON),NP SWAB IN TRANSPORT MEDIA 3-4 HR TAT, RT-PCR - Swab, Nasopharynx; Future  -     POCT rapid strep A  -     COVID-19,CEPHEID/RAGHAV/BDMAX,COR/SY/PAD/PARVEZ IN-HOUSE(OR EMERGENT/ADD-ON),NP SWAB IN TRANSPORT MEDIA 3-4 HR TAT, RT-PCR - Swab, Nasopharynx    3. Allergic rhinitis, unspecified seasonality, unspecified trigger    Other orders  -     dexamethasone (DECADRON) 6 MG tablet; Take 1 tablet by mouth Daily With Breakfast.  Dispense: 10 tablet; Refill: 0       Will obtain covid swab call with results, will start decadron, no wheezing at this time continue allergy medications, tylenol for headache, increase fluids  Strep negative in office     Follow Up:   Return if symptoms worsen or fail to improve.    DORCAS Ulrich      Please note that portions of this note may have been completed with a voice recognition program. Efforts were made to edit the dictations, but occasionally words are mistranscribed.

## 2021-09-02 LAB — SARS-COV-2 RNA RESP QL NAA+PROBE: NOT DETECTED

## 2021-09-02 RX ORDER — AMOXICILLIN AND CLAVULANATE POTASSIUM 875; 125 MG/1; MG/1
1 TABLET, FILM COATED ORAL 2 TIMES DAILY
Qty: 20 TABLET | Refills: 0 | Status: SHIPPED | OUTPATIENT
Start: 2021-09-02 | End: 2021-09-12

## 2021-09-07 ENCOUNTER — HOSPITAL ENCOUNTER (OUTPATIENT)
Dept: GENERAL RADIOLOGY | Facility: HOSPITAL | Age: 56
Discharge: HOME | End: 2021-09-07
Admitting: NURSE PRACTITIONER

## 2021-09-07 ENCOUNTER — TELEPHONE (OUTPATIENT)
Dept: FAMILY MEDICINE CLINIC | Facility: CLINIC | Age: 56
End: 2021-09-07

## 2021-09-07 DIAGNOSIS — R05.9 COUGH: ICD-10-CM

## 2021-09-07 DIAGNOSIS — R07.89 CHEST PRESSURE: ICD-10-CM

## 2021-09-07 DIAGNOSIS — R05.9 COUGH: Primary | ICD-10-CM

## 2021-09-07 PROCEDURE — 71046 X-RAY EXAM CHEST 2 VIEWS: CPT

## 2021-09-07 RX ORDER — ALBUTEROL SULFATE 90 UG/1
AEROSOL, METERED RESPIRATORY (INHALATION)
Qty: 8.5 G | Refills: 1 | Status: SHIPPED | OUTPATIENT
Start: 2021-09-07

## 2021-09-07 RX ORDER — BENZONATATE 200 MG/1
200 CAPSULE ORAL 3 TIMES DAILY PRN
Qty: 90 CAPSULE | Refills: 1 | Status: SHIPPED | OUTPATIENT
Start: 2021-09-07 | End: 2022-04-06

## 2021-09-07 RX ORDER — DEXTROMETHORPHAN HYDROBROMIDE AND PROMETHAZINE HYDROCHLORIDE 15; 6.25 MG/5ML; MG/5ML
5 SYRUP ORAL 4 TIMES DAILY PRN
Qty: 473 ML | Refills: 0 | Status: SHIPPED | OUTPATIENT
Start: 2021-09-07 | End: 2022-04-06

## 2021-09-07 NOTE — TELEPHONE ENCOUNTER
Patient says she doesn't feel well enough to get her xray done. However wants to know if something else can be sent for the cough? I told her the order will still be there if she changes her mind.

## 2021-09-07 NOTE — TELEPHONE ENCOUNTER
Patient called and said she has an awful cough that causes pressure when she breathes. Her covid was negative the other day. Please advise.

## 2021-09-14 ENCOUNTER — TELEPHONE (OUTPATIENT)
Dept: FAMILY MEDICINE CLINIC | Facility: CLINIC | Age: 56
End: 2021-09-14

## 2021-09-14 NOTE — TELEPHONE ENCOUNTER
Patient has picked up 4 (12 hour ) shifts over the next 4 days, she is not available to come in for an appointment.  She asked if you could put in an order for a culture or UA and let her do it at the hospital??

## 2021-09-14 NOTE — TELEPHONE ENCOUNTER
Patient said she spoke with you before and said that once she starts the antibiotic that it would start to mess with her kidneys. Well today she wiped and there was a little blood and her back has been hurting. She wants to know if there is something over the counter to take or if you can send something in?

## 2021-09-15 DIAGNOSIS — M54.50 ACUTE LOW BACK PAIN WITHOUT SCIATICA, UNSPECIFIED BACK PAIN LATERALITY: Primary | ICD-10-CM

## 2021-09-15 DIAGNOSIS — R31.9 HEMATURIA, UNSPECIFIED TYPE: ICD-10-CM

## 2021-09-16 ENCOUNTER — LAB (OUTPATIENT)
Dept: FAMILY MEDICINE CLINIC | Facility: CLINIC | Age: 56
End: 2021-09-16

## 2021-09-16 DIAGNOSIS — R31.9 HEMATURIA, UNSPECIFIED TYPE: ICD-10-CM

## 2021-09-16 DIAGNOSIS — M54.50 ACUTE LOW BACK PAIN WITHOUT SCIATICA, UNSPECIFIED BACK PAIN LATERALITY: ICD-10-CM

## 2021-09-16 LAB
BACTERIA UR QL AUTO: ABNORMAL /HPF
BILIRUB UR QL STRIP: NEGATIVE
CLARITY UR: CLEAR
COLOR UR: YELLOW
GLUCOSE UR STRIP-MCNC: NEGATIVE MG/DL
HGB UR QL STRIP.AUTO: NEGATIVE
HYALINE CASTS UR QL AUTO: ABNORMAL /LPF
KETONES UR QL STRIP: NEGATIVE
LEUKOCYTE ESTERASE UR QL STRIP.AUTO: NEGATIVE
NITRITE UR QL STRIP: NEGATIVE
PH UR STRIP.AUTO: 7.5 [PH] (ref 5–8)
PROT UR QL STRIP: NEGATIVE
RBC # UR: ABNORMAL /HPF
REF LAB TEST METHOD: ABNORMAL
SP GR UR STRIP: 1.01 (ref 1–1.03)
SQUAMOUS #/AREA URNS HPF: ABNORMAL /HPF
UROBILINOGEN UR QL STRIP: NORMAL
WBC UR QL AUTO: ABNORMAL /HPF

## 2021-09-16 PROCEDURE — 87086 URINE CULTURE/COLONY COUNT: CPT | Performed by: NURSE PRACTITIONER

## 2021-09-16 PROCEDURE — 81001 URINALYSIS AUTO W/SCOPE: CPT | Performed by: NURSE PRACTITIONER

## 2021-09-17 ENCOUNTER — TELEPHONE (OUTPATIENT)
Dept: FAMILY MEDICINE CLINIC | Facility: CLINIC | Age: 56
End: 2021-09-17

## 2021-09-17 DIAGNOSIS — M54.50 LOW BACK PAIN, UNSPECIFIED BACK PAIN LATERALITY, UNSPECIFIED CHRONICITY, UNSPECIFIED WHETHER SCIATICA PRESENT: ICD-10-CM

## 2021-09-17 DIAGNOSIS — R82.90 ABNORMAL FINDING ON URINALYSIS: ICD-10-CM

## 2021-09-17 DIAGNOSIS — R39.15 URINARY URGENCY: Primary | ICD-10-CM

## 2021-09-17 LAB — BACTERIA SPEC AEROBE CULT: NORMAL

## 2021-09-17 RX ORDER — CIPROFLOXACIN 500 MG/1
500 TABLET, FILM COATED ORAL 2 TIMES DAILY
Qty: 20 TABLET | Refills: 0 | Status: ON HOLD | OUTPATIENT
Start: 2021-09-17 | End: 2022-03-03

## 2021-09-17 RX ORDER — PHENAZOPYRIDINE HYDROCHLORIDE 200 MG/1
200 TABLET, FILM COATED ORAL 3 TIMES DAILY PRN
COMMUNITY
End: 2021-09-17 | Stop reason: SDUPTHER

## 2021-09-17 RX ORDER — PHENAZOPYRIDINE HYDROCHLORIDE 200 MG/1
200 TABLET, FILM COATED ORAL 3 TIMES DAILY PRN
Qty: 6 TABLET | Refills: 0 | Status: ON HOLD | OUTPATIENT
Start: 2021-09-17 | End: 2022-03-03

## 2021-09-17 RX ORDER — CIPROFLOXACIN 500 MG/1
500 TABLET, FILM COATED ORAL 2 TIMES DAILY
COMMUNITY
End: 2021-09-17 | Stop reason: SDUPTHER

## 2021-09-17 NOTE — TELEPHONE ENCOUNTER
----- Message from DORCAS Hearn sent at 9/17/2021  1:16 PM EDT -----  LIKELY CONTAMINATION RECOMMEND RECOLLECT UA

## 2021-09-23 ENCOUNTER — APPOINTMENT (OUTPATIENT)
Dept: MAMMOGRAPHY | Facility: HOSPITAL | Age: 56
End: 2021-09-23

## 2021-12-02 ENCOUNTER — HOSPITAL ENCOUNTER (OUTPATIENT)
Dept: MAMMOGRAPHY | Facility: HOSPITAL | Age: 56
Discharge: HOME | End: 2021-12-02
Admitting: NURSE PRACTITIONER

## 2021-12-02 DIAGNOSIS — Z12.31 SCREENING MAMMOGRAM, ENCOUNTER FOR: ICD-10-CM

## 2021-12-02 PROCEDURE — 77067 SCR MAMMO BI INCL CAD: CPT

## 2021-12-02 PROCEDURE — 77063 BREAST TOMOSYNTHESIS BI: CPT

## 2021-12-22 ENCOUNTER — TELEPHONE (OUTPATIENT)
Dept: FAMILY MEDICINE CLINIC | Facility: CLINIC | Age: 56
End: 2021-12-22

## 2022-01-05 ENCOUNTER — OFFICE VISIT (OUTPATIENT)
Dept: FAMILY MEDICINE CLINIC | Facility: CLINIC | Age: 57
End: 2022-01-05

## 2022-01-05 VITALS
HEIGHT: 63 IN | SYSTOLIC BLOOD PRESSURE: 132 MMHG | HEART RATE: 81 BPM | WEIGHT: 202 LBS | BODY MASS INDEX: 35.79 KG/M2 | DIASTOLIC BLOOD PRESSURE: 79 MMHG | OXYGEN SATURATION: 98 % | TEMPERATURE: 98.4 F

## 2022-01-05 DIAGNOSIS — E53.8 B12 DEFICIENCY: ICD-10-CM

## 2022-01-05 DIAGNOSIS — M54.12 RADICULITIS, CERVICAL: ICD-10-CM

## 2022-01-05 DIAGNOSIS — E53.8 FOLIC ACID DEFICIENCY: ICD-10-CM

## 2022-01-05 DIAGNOSIS — E61.1 IRON DEFICIENCY: ICD-10-CM

## 2022-01-05 DIAGNOSIS — R94.31 ABNORMAL ECG: ICD-10-CM

## 2022-01-05 DIAGNOSIS — K21.9 GASTROESOPHAGEAL REFLUX DISEASE WITHOUT ESOPHAGITIS: ICD-10-CM

## 2022-01-05 DIAGNOSIS — M25.511 ACUTE PAIN OF RIGHT SHOULDER: ICD-10-CM

## 2022-01-05 DIAGNOSIS — E78.2 MIXED HYPERLIPIDEMIA: ICD-10-CM

## 2022-01-05 DIAGNOSIS — R10.13 EPIGASTRIC PAIN: ICD-10-CM

## 2022-01-05 DIAGNOSIS — R51.9 NEW ONSET HEADACHE: ICD-10-CM

## 2022-01-05 DIAGNOSIS — M54.2 NECK PAIN: ICD-10-CM

## 2022-01-05 LAB
AMYLASE SERPL-CCNC: 52 U/L (ref 28–100)
CK MB SERPL-CCNC: 1.78 NG/ML
FERRITIN SERPL-MCNC: 102 NG/ML (ref 13–150)
FOLATE SERPL-MCNC: 3.7 NG/ML (ref 4.78–24.2)
IRON 24H UR-MRATE: 82 MCG/DL (ref 37–145)
IRON SATN MFR SERPL: 19 % (ref 20–50)
LIPASE SERPL-CCNC: 22 U/L (ref 13–60)
TIBC SERPL-MCNC: 426 MCG/DL (ref 298–536)
TRANSFERRIN SERPL-MCNC: 286 MG/DL (ref 200–360)
TROPONIN T SERPL-MCNC: <0.01 NG/ML (ref 0–0.03)
VIT B12 BLD-MCNC: 268 PG/ML (ref 211–946)

## 2022-01-05 PROCEDURE — 83690 ASSAY OF LIPASE: CPT | Performed by: NURSE PRACTITIONER

## 2022-01-05 PROCEDURE — 82728 ASSAY OF FERRITIN: CPT | Performed by: NURSE PRACTITIONER

## 2022-01-05 PROCEDURE — 86677 HELICOBACTER PYLORI ANTIBODY: CPT | Performed by: NURSE PRACTITIONER

## 2022-01-05 PROCEDURE — 82746 ASSAY OF FOLIC ACID SERUM: CPT | Performed by: NURSE PRACTITIONER

## 2022-01-05 PROCEDURE — 82150 ASSAY OF AMYLASE: CPT | Performed by: NURSE PRACTITIONER

## 2022-01-05 PROCEDURE — 36415 COLL VENOUS BLD VENIPUNCTURE: CPT | Performed by: NURSE PRACTITIONER

## 2022-01-05 PROCEDURE — 82553 CREATINE MB FRACTION: CPT | Performed by: NURSE PRACTITIONER

## 2022-01-05 PROCEDURE — 84484 ASSAY OF TROPONIN QUANT: CPT | Performed by: NURSE PRACTITIONER

## 2022-01-05 PROCEDURE — 84466 ASSAY OF TRANSFERRIN: CPT | Performed by: NURSE PRACTITIONER

## 2022-01-05 PROCEDURE — 83540 ASSAY OF IRON: CPT | Performed by: NURSE PRACTITIONER

## 2022-01-05 PROCEDURE — 82607 VITAMIN B-12: CPT | Performed by: NURSE PRACTITIONER

## 2022-01-05 PROCEDURE — 99214 OFFICE O/P EST MOD 30 MIN: CPT | Performed by: NURSE PRACTITIONER

## 2022-01-05 RX ORDER — METHYLPREDNISOLONE ACETATE 40 MG/ML
40 INJECTION, SUSPENSION INTRA-ARTICULAR; INTRALESIONAL; INTRAMUSCULAR; SOFT TISSUE ONCE
Status: COMPLETED | OUTPATIENT
Start: 2022-01-05 | End: 2022-01-10

## 2022-01-05 RX ORDER — OMEPRAZOLE 40 MG/1
40 CAPSULE, DELAYED RELEASE ORAL DAILY
Qty: 90 CAPSULE | Refills: 1 | Status: SHIPPED | OUTPATIENT
Start: 2022-01-05 | End: 2022-04-06

## 2022-01-05 NOTE — PROGRESS NOTES
Follow Up Office Visit      Patient Name: Pam Cade  : 1965   MRN: [id number]     Chief Complaint:    Chief Complaint   Patient presents with   • Headache   • Shoulder Pain   • Neck Pain   • Heartburn   • Chest Pain       History of Present Illness: Pam Cade is a 57 y.o. female who is here today for     C/O-neck  pain that radiates down right arm also right shoulder pain after lifting heavy patient  Also c/o sharp pain in the back of her head since pain in neck started   Pt states she stopped topamax due to memory issues , only one  Migraine per week since stopping topamax     Epigastric pain intermittent for 2 weeks feels it may be related to reflux, dull pain, denies radiation  Denies n/v patient reports a history of irregular heartbeat seen by cardiology in the past in 2019 Dr. Harley cleared her for surgery with Dr. Terrell orthopedics    Requests right shoulder injection    Requests b12 injection   Subjective      Review of Systems:   Review of Systems   Constitutional: Negative for fever.   HENT: Negative for ear pain, sinus pain and sore throat.    Eyes: Negative for visual disturbance.   Respiratory: Negative for cough.    Cardiovascular: Positive for chest pain.   Gastrointestinal: Positive for abdominal pain. Negative for constipation, diarrhea, nausea and vomiting.   Genitourinary: Negative for dysuria.   Musculoskeletal: Positive for arthralgias, myalgias and neck pain.        Past Medical History:   Past Medical History:   Diagnosis Date   • Allergic rhinitis due to allergen 2021   • Ankle pain    • Arthritis    • B12 deficiency 2019   • Bladder disorder    • Condition not found     Ulcer   • Cramps of lower extremity    • Folic acid deficiency 2019   • Foot pain, left 2018   • Gastroesophageal reflux    • High cholesterol    • Hypertension    • Iron deficiency 2019   • Kidney problem        Past Surgical History:   Past Surgical History:   Procedure  Laterality Date   • HYSTERECTOMY     • INCONTINENCE SURGERY     • OTHER SURGICAL HISTORY      Artificial Joints/Limbs   • OTHER SURGICAL HISTORY      Joint surgery   • REVISION / REMOVAL NEUROSTIMULATOR     • TOTAL KNEE ARTHROPLASTY Left        Family History:   Family History   Problem Relation Age of Onset   • Cancer Sister    • Cancer Daughter    • Arthritis Daughter        Social History:   Social History     Socioeconomic History   • Marital status:    Tobacco Use   • Smoking status: Never Smoker   • Smokeless tobacco: Never Used   Substance and Sexual Activity   • Alcohol use: Yes     Comment: Current some day occasionally drinks, has been drinking for 6-10 years   • Drug use: Never       Medications:     Current Outpatient Medications:   •  albuterol sulfate  (90 Base) MCG/ACT inhaler, Take 1-2 Puffs every 4 hours as needed, Disp: 8.5 g, Rfl: 1  •  ascorbic acid (VITAMIN C) 250 MG tablet, Take 250 mg by mouth Daily., Disp: , Rfl:   •  benzonatate (TESSALON) 200 MG capsule, Take 1 capsule by mouth 3 (Three) Times a Day As Needed for Cough., Disp: 90 capsule, Rfl: 1  •  Cholecalciferol (Vitamin D3) 50 MCG (2000 UT) capsule, Take 1 capsule by mouth Daily., Disp: 90 capsule, Rfl: 1  •  ciprofloxacin (CIPRO) 500 MG tablet, Take 1 tablet by mouth 2 (Two) Times a Day., Disp: 20 tablet, Rfl: 0  •  cyanocobalamin 1000 MCG/ML injection, Inject 1 mL into the appropriate muscle as directed by prescriber Every 30 (Thirty) Days., Disp: 1 mL, Rfl: 5  •  Diclofenac Sodium (VOLTAREN) 1 % gel gel, Apply 4 g topically to the appropriate area as directed 4 (Four) Times a Day As Needed (AS NEEDED)., Disp: 100 g, Rfl: 1  •  fluticasone (FLONASE) 50 MCG/ACT nasal spray, Instill 2 sprays into the nostril(s) as directed by provider Daily., Disp: 16 g, Rfl: 1  •  folic acid (FOLVITE) 1 MG tablet, Take 1 mg by mouth Daily., Disp: , Rfl:   •  loratadine (CLARITIN) 10 MG tablet, Take 1 tablet by mouth Daily., Disp: 90  "tablet, Rfl: 1  •  metoprolol succinate XL (TOPROL-XL) 25 MG 24 hr tablet, Take 1 tablet by mouth Daily., Disp: 90 tablet, Rfl: 1  •  montelukast (SINGULAIR) 10 MG tablet, Take 1 tablet by mouth Every Night., Disp: 90 tablet, Rfl: 1  •  omeprazole (priLOSEC) 40 MG capsule, Take 1 capsule by mouth Daily., Disp: 90 capsule, Rfl: 1  •  phenazopyridine (PYRIDIUM) 200 MG tablet, Take 1 tablet by mouth 3 (Three) Times a Day As Needed (URINARY PRESSURE AND LOW BACK PAIN)., Disp: 6 tablet, Rfl: 0  •  promethazine-dextromethorphan (PROMETHAZINE-DM) 6.25-15 MG/5ML syrup, Take 5 mL by mouth 4 (Four) Times a Day As Needed for Cough., Disp: 473 mL, Rfl: 0  No current facility-administered medications for this visit.    Allergies:   No Known Allergies      Objective     Physical Exam:  Vital Signs:   Vitals:    01/05/22 1504   BP: 132/79   Pulse: 81   Temp: 98.4 °F (36.9 °C)   SpO2: 98%   Weight: 91.6 kg (202 lb)   Height: 160 cm (63\")     Body mass index is 35.78 kg/m².     Physical Exam  HENT:      Right Ear: Tympanic membrane normal.      Left Ear: Tympanic membrane normal.      Nose: Nose normal.      Mouth/Throat:      Mouth: Mucous membranes are moist.   Eyes:      Pupils: Pupils are equal, round, and reactive to light.   Neck:      Vascular: No carotid bruit.     Cardiovascular:      Rate and Rhythm: Normal rate and regular rhythm.      Heart sounds: Normal heart sounds. No murmur heard.      Pulmonary:      Effort: Pulmonary effort is normal.      Breath sounds: Normal breath sounds.   Abdominal:      General: Bowel sounds are normal.      Palpations: Abdomen is soft.      Tenderness: There is no abdominal tenderness.   Musculoskeletal:      Cervical back: Tenderness present.      Right lower leg: No edema.      Left lower leg: No edema.      Comments: Right shoulder unable to reach center of back    Skin:     General: Skin is warm and dry.   Neurological:      Mental Status: She is alert.     Procedure:   Right Shoulder " Joint Injection    Procedure Information:   Informed consent obtained. Patient was informed of possible adverse effects including but not limited to bleeding, damage to nerve, tendon or artery, increased blood sugar and increased blood pressure. Time out was performed. Patient was placed with shoulder passively hanging from side of body at 0 degrees. Lateral edge of scapular spine was palpated and site was marked just inferior to this, to proceed with injection per typical posterior approach. Betadine was swabbed at injection site per typical technique. 27 gauge, 1.5 inch needle injected into bursa, directed slightly medially, aspiration was performed and then Depo-Medrol 40 mg and 2 mL 1% lidocaine without epinephrine was slowly injected into joint space, fluid was free flowing. Needle was removed, betadine wiped off and band-aid placed to injection site.     The patient was instructed to call our office if they had any questions or concerns.            ECG 12 Lead    Date/Time: 1/10/2022 7:14 AM  Performed by: Ally Barnard APRN  Authorized by: Ally Barnard APRN   Comparison: compared with previous ECG from 11/27/2018  Similar to previous ECG  Comparison to previous ECG: Abnormal ECG sinus rhythm LVH with IVCD and secondary repolarization abnormal prolonged QT interval  Rhythm: sinus rhythm  Conduction: left bundle branch block    Clinical impression: abnormal EKG  Comments: Left bundle branch block ST elevation secondary to IVCD             Assessment / Plan      Assessment/Plan:   Diagnoses and all orders for this visit:    1. Neck pain  -     CT Head Without Contrast; Future  -     CT Cervical Spine Without Contrast; Future    2. Acute pain of right shoulder  -     methylPREDNISolone acetate (DEPO-medrol) injection 40 mg    3. Radiculitis, cervical    4. New onset headache  -     CT Head Without Contrast; Future    5. Gastroesophageal reflux disease without esophagitis  -      Helicobacter Pylori, IgA IgG IgM  -     Amylase  -     Lipase  -     Cancel: CBC & Differential    6. Epigastric pain  -     CK-MB  -     Troponin  -     ECG 12 Lead    7. Mixed hyperlipidemia  -     Cancel: Comprehensive Metabolic Panel  -     Lipid Panel; Future  -     Cancel: Lipid Panel    8. Iron deficiency  -     Iron Profile  -     Ferritin    9. B12 deficiency  -     Vitamin B12    10. Folic acid deficiency  -     Folate    11. Abnormal ECG  -     Ambulatory Referral to Cardiology  -     ECG 12 Lead    Other orders  -     omeprazole (priLOSEC) 40 MG capsule; Take 1 capsule by mouth Daily.  Dispense: 90 capsule; Refill: 1       Iron deficiency we will obtain iron profile to monitor  No onset headache will obtain CT of the head patient is unable to have MRI due to stimulator for the bladder  Neck pain new onset radiculitis will obtain CT of the cervical spine  Reflux uncontrolled will obtain H. pylori amylase lipase CBC epigastric pain will obtain CK-MB troponin EKG in office  Hyperlipidemia will obtain CMP and lipid panel to monitor  B12 deficiency will obtain B12 level  Folic acid deficiency we will obtain folic acid level  Reflux uncontrolled we will add omeprazole 40 mg daily  Acute right shoulder pain will provide injection in office if symptoms persist will obtain imaging and refer to physical therapy patient declines referral to physical therapy at this time due to work schedule  Abnormal EKG will refer to cardiology        Follow Up:   Return in about 3 months (around 4/5/2022).    DORCAS Ulrich      Please note that portions of this note were completed with a voice recognition program.

## 2022-01-06 LAB
H PYLORI IGA SER-ACNC: <9 UNITS (ref 0–8.9)
H PYLORI IGG SER IA-ACNC: 0.41 INDEX VALUE (ref 0–0.79)
H PYLORI IGM SER-ACNC: <9 UNITS (ref 0–8.9)

## 2022-01-10 ENCOUNTER — TELEPHONE (OUTPATIENT)
Dept: FAMILY MEDICINE CLINIC | Facility: CLINIC | Age: 57
End: 2022-01-10

## 2022-01-10 PROCEDURE — 93000 ELECTROCARDIOGRAM COMPLETE: CPT | Performed by: NURSE PRACTITIONER

## 2022-01-10 RX ADMIN — METHYLPREDNISOLONE ACETATE 40 MG: 40 INJECTION, SUSPENSION INTRA-ARTICULAR; INTRALESIONAL; INTRAMUSCULAR; SOFT TISSUE at 07:18

## 2022-01-19 ENCOUNTER — HOSPITAL ENCOUNTER (OUTPATIENT)
Dept: CT IMAGING | Facility: HOSPITAL | Age: 57
Discharge: HOME | End: 2022-01-19
Admitting: NURSE PRACTITIONER

## 2022-01-19 DIAGNOSIS — R51.9 NEW ONSET HEADACHE: ICD-10-CM

## 2022-01-19 DIAGNOSIS — M54.2 NECK PAIN: ICD-10-CM

## 2022-01-19 PROCEDURE — 70450 CT HEAD/BRAIN W/O DYE: CPT

## 2022-01-19 PROCEDURE — 72125 CT NECK SPINE W/O DYE: CPT

## 2022-01-20 DIAGNOSIS — J34.9 PARANASAL SINUS DISEASE: Primary | ICD-10-CM

## 2022-01-24 DIAGNOSIS — B00.9 HERPES: Primary | ICD-10-CM

## 2022-01-24 RX ORDER — VALACYCLOVIR HYDROCHLORIDE 500 MG/1
500 TABLET, FILM COATED ORAL 2 TIMES DAILY
Qty: 60 TABLET | Refills: 1 | Status: SHIPPED | OUTPATIENT
Start: 2022-01-24

## 2022-01-24 RX ORDER — VALACYCLOVIR HYDROCHLORIDE 500 MG/1
500 TABLET, FILM COATED ORAL 2 TIMES DAILY
COMMUNITY
End: 2022-01-24 | Stop reason: SDUPTHER

## 2022-02-02 ENCOUNTER — OFFICE VISIT (OUTPATIENT)
Dept: CARDIOLOGY | Facility: CLINIC | Age: 57
End: 2022-02-02

## 2022-02-02 VITALS
BODY MASS INDEX: 36.14 KG/M2 | WEIGHT: 204 LBS | SYSTOLIC BLOOD PRESSURE: 144 MMHG | HEIGHT: 63 IN | HEART RATE: 106 BPM | DIASTOLIC BLOOD PRESSURE: 97 MMHG

## 2022-02-02 DIAGNOSIS — R55 POSTURAL DIZZINESS WITH PRESYNCOPE: ICD-10-CM

## 2022-02-02 DIAGNOSIS — R42 POSTURAL DIZZINESS WITH PRESYNCOPE: ICD-10-CM

## 2022-02-02 DIAGNOSIS — R94.31 ABNORMAL EKG: ICD-10-CM

## 2022-02-02 DIAGNOSIS — R07.2 PRECORDIAL PAIN: Primary | ICD-10-CM

## 2022-02-02 PROCEDURE — 99204 OFFICE O/P NEW MOD 45 MIN: CPT | Performed by: INTERNAL MEDICINE

## 2022-02-02 NOTE — PROGRESS NOTES
Chief Complaint  LEFT BBB and Hypertension    Subjective            Pam Cade presents to [facility] CARDIOLOGY  History of Present Illness    57-year-old white female.  She has no previous cardiac problems in the past.  For the past 1 to 2 months she has been having some intermittent dizzy spells, right arm numbness and chest tightness.  They are somewhat random, mild to moderately intense, not specifically with exertion.  They last a few minutes and resolved.  She has not had syncope.  She had an EKG done recently in primary care which showed a left bundle branch block.  Looking back at EKGs from 2018 in 2019, she had an incomplete left bundle branch block on those tracings as well.    [facility]  Past Medical History:   Diagnosis Date   • Allergic rhinitis due to allergen 01/08/2021   • Ankle pain    • Arthritis    • B12 deficiency 02/22/2019   • Bladder disorder    • Condition not found     Ulcer   • Cramps of lower extremity    • Folic acid deficiency 02/22/2019   • Foot pain, left 03/20/2018   • Gastroesophageal reflux    • High cholesterol    • Hypertension    • Iron deficiency 02/22/2019   • Kidney problem          SURGICALHX  Past Surgical History:   Procedure Laterality Date   • HYSTERECTOMY     • INCONTINENCE SURGERY     • OTHER SURGICAL HISTORY      Artificial Joints/Limbs   • OTHER SURGICAL HISTORY      Joint surgery   • REVISION / REMOVAL NEUROSTIMULATOR     • TOTAL KNEE ARTHROPLASTY Left         SOC  Social History     Socioeconomic History   • Marital status:    Tobacco Use   • Smoking status: Never Smoker   • Smokeless tobacco: Never Used   Vaping Use   • Vaping Use: Never used   Substance and Sexual Activity   • Alcohol use: Yes     Comment: Current some day occasionally drinks, has been drinking for 6-10 years   • Drug use: Never   • Sexual activity: Defer         FAMHX  Family History   Problem Relation Age of Onset   • Cancer Sister    • Cancer Daughter    • Arthritis  Daughter           ALLERGY  No Known Allergies     MEDSCURRENT    Current Outpatient Medications:   •  albuterol sulfate  (90 Base) MCG/ACT inhaler, Take 1-2 Puffs every 4 hours as needed, Disp: 8.5 g, Rfl: 1  •  ascorbic acid (VITAMIN C) 250 MG tablet, Take 250 mg by mouth Daily., Disp: , Rfl:   •  Cholecalciferol (Vitamin D3) 50 MCG (2000 UT) capsule, Take 1 capsule by mouth Daily., Disp: 90 capsule, Rfl: 1  •  cyanocobalamin 1000 MCG/ML injection, Inject 1 mL into the appropriate muscle as directed by prescriber Every 30 (Thirty) Days., Disp: 1 mL, Rfl: 5  •  Diclofenac Sodium (VOLTAREN) 1 % gel gel, Apply 4 g topically to the appropriate area as directed 4 (Four) Times a Day As Needed (AS NEEDED)., Disp: 100 g, Rfl: 1  •  fluticasone (FLONASE) 50 MCG/ACT nasal spray, Instill 2 sprays into the nostril(s) as directed by provider Daily., Disp: 16 g, Rfl: 1  •  folic acid (FOLVITE) 1 MG tablet, Take 1 mg by mouth Daily., Disp: , Rfl:   •  loratadine (CLARITIN) 10 MG tablet, Take 1 tablet by mouth Daily., Disp: 90 tablet, Rfl: 1  •  metoprolol succinate XL (TOPROL-XL) 25 MG 24 hr tablet, Take 1 tablet by mouth Daily., Disp: 90 tablet, Rfl: 1  •  omeprazole (priLOSEC) 40 MG capsule, Take 1 capsule by mouth Daily., Disp: 90 capsule, Rfl: 1  •  valACYclovir (VALTREX) 500 MG tablet, Take 1 tablet by mouth 2 (Two) Times a Day. (Patient taking differently: Take 500 mg by mouth 2 (Two) Times a Day As Needed.), Disp: 60 tablet, Rfl: 1  •  benzonatate (TESSALON) 200 MG capsule, Take 1 capsule by mouth 3 (Three) Times a Day As Needed for Cough., Disp: 90 capsule, Rfl: 1  •  ciprofloxacin (CIPRO) 500 MG tablet, Take 1 tablet by mouth 2 (Two) Times a Day., Disp: 20 tablet, Rfl: 0  •  montelukast (SINGULAIR) 10 MG tablet, Take 1 tablet by mouth Every Night., Disp: 90 tablet, Rfl: 1  •  phenazopyridine (PYRIDIUM) 200 MG tablet, Take 1 tablet by mouth 3 (Three) Times a Day As Needed (URINARY PRESSURE AND LOW BACK PAIN).,  "Disp: 6 tablet, Rfl: 0  •  promethazine-dextromethorphan (PROMETHAZINE-DM) 6.25-15 MG/5ML syrup, Take 5 mL by mouth 4 (Four) Times a Day As Needed for Cough., Disp: 473 mL, Rfl: 0      Review of Systems   Constitutional: Negative.   HENT: Negative.    Eyes: Negative.    Cardiovascular: Positive for chest pain and dyspnea on exertion.   Respiratory: Positive for shortness of breath.    Endocrine: Negative.    Hematologic/Lymphatic: Negative.    Skin: Negative.    Musculoskeletal: Negative.    Gastrointestinal: Negative.    Genitourinary: Negative.    Neurological: Positive for dizziness.   Psychiatric/Behavioral: Negative.         Objective     /97   Pulse 106   Ht 160 cm (63\")   Wt 92.5 kg (204 lb)   BMI 36.14 kg/m²       General Appearance:   · well developed  · well nourished  HENT:   · oropharynx moist  · lips not cyanotic  Neck:  · thyroid not enlarged  · supple  Respiratory:  · no respiratory distress  · normal breath sounds  · no rales  Cardiovascular:  · no jugular venous distention  · regular rhythm  · apical impulse normal  · S1 normal, S2 normal  · no S3, no S4   · no murmur  · no rub, no thrill  · carotid pulses normal; no bruit  · pedal pulses normal  · lower extremity edema: none    Musculoskeletal:  · no clubbing of fingers.   · normocephalic, head atraumatic  Skin:   · warm, dry  Psychiatric:  · judgement and insight appropriate  · normal mood and affect      Result Review :     The following data was reviewed by: Juan Hall MD on 02/02/2022:    CMP    CMP 7/8/21   Glucose 89   BUN 13   Creatinine 0.77   eGFR Non African Am 78   Sodium 141   Potassium 4.2   Chloride 104   Calcium 9.5   Albumin 4.70   Total Bilirubin 0.5   Alkaline Phosphatase 94   AST (SGOT) 21   ALT (SGPT) 17           CBC    CBC 7/8/21   WBC 5.16   RBC 4.68   Hemoglobin 13.6   Hematocrit 41.7   MCV 89.1   MCH 29.1   MCHC 32.6   RDW 12.7   Platelets 262           Lipid Panel    Lipid Panel 7/8/21   Total " Cholesterol 233 (A)   Triglycerides 70   HDL Cholesterol 79 (A)   VLDL Cholesterol 12   LDL Cholesterol  142 (A)   LDL/HDL Ratio 1.77   (A) Abnormal value            TSH    TSH 7/8/21   TSH 2.050             Data reviewed: Primary care records reviewed, EKG reviewed from January 5 showing sinus rhythm with left bundle branch block.     Procedures                Assessment and Plan        ASSESSMENT:  Encounter Diagnoses   Name Primary?   • Precordial pain Yes   • Postural dizziness with presyncope    • Abnormal EKG          PLAN:    1.  Ms. Cade has somewhat atypical chest discomfort, and intermittent presyncope.  A 15-day event monitor will be scheduled in addition to an echo and stress test.  2.  We discussed the pathophysiology of the left bundle branch block today.  This may not be clinically significant based on what the remainder of her cardiac work-up shows.  3.  We will discussed diagnostic results when available.          Patient was given instructions and counseling regarding her condition or for health maintenance advice. Please see specific information pulled into the AVS if appropriate.             CATHERINE Hall MD  2/2/2022    12:37 EST

## 2022-02-25 ENCOUNTER — HOSPITAL ENCOUNTER (OUTPATIENT)
Dept: NUCLEAR MEDICINE | Facility: HOSPITAL | Age: 57
Discharge: HOME | End: 2022-02-25

## 2022-02-25 ENCOUNTER — TELEPHONE (OUTPATIENT)
Dept: CARDIOLOGY | Facility: CLINIC | Age: 57
End: 2022-02-25

## 2022-02-25 ENCOUNTER — PREP FOR SURGERY (OUTPATIENT)
Dept: OTHER | Facility: HOSPITAL | Age: 57
End: 2022-02-25

## 2022-02-25 DIAGNOSIS — R07.2 PRECORDIAL PAIN: ICD-10-CM

## 2022-02-25 DIAGNOSIS — R94.39 ABNORMAL NUCLEAR STRESS TEST: Primary | ICD-10-CM

## 2022-02-25 DIAGNOSIS — I42.0 CARDIOMYOPATHY, DILATED: ICD-10-CM

## 2022-02-25 DIAGNOSIS — R55 POSTURAL DIZZINESS WITH PRESYNCOPE: ICD-10-CM

## 2022-02-25 DIAGNOSIS — R42 POSTURAL DIZZINESS WITH PRESYNCOPE: ICD-10-CM

## 2022-02-25 LAB
BH CV IMMEDIATE POST TECH DATA BLOOD PRESSURE: NORMAL MMHG
BH CV IMMEDIATE POST TECH DATA HEART RATE: 105 BPM
BH CV IMMEDIATE POST TECH DATA OXYGEN SATS: 98 %
BH CV REST NUCLEAR ISOTOPE DOSE: 10.2 MCI
BH CV SIX MINUTE RECOVERY TECH DATA BLOOD PRESSURE: NORMAL
BH CV SIX MINUTE RECOVERY TECH DATA HEART RATE: 90 BPM
BH CV SIX MINUTE RECOVERY TECH DATA OXYGEN SATURATION: 98 %
BH CV STRESS BP STAGE 1: NORMAL
BH CV STRESS COMMENTS STAGE 1: NORMAL
BH CV STRESS DOSE REGADENOSON STAGE 1: 0.4
BH CV STRESS DURATION MIN STAGE 1: 0
BH CV STRESS DURATION SEC STAGE 1: 10
BH CV STRESS GRADE STAGE 1: 10
BH CV STRESS HR STAGE 1: 88
BH CV STRESS METS STAGE 1: 5
BH CV STRESS NUCLEAR ISOTOPE DOSE: 36.9 MCI
BH CV STRESS O2 STAGE 1: 97
BH CV STRESS PROTOCOL 1: NORMAL
BH CV STRESS RECOVERY BP: NORMAL MMHG
BH CV STRESS RECOVERY HR: 90 BPM
BH CV STRESS RECOVERY O2: 98 %
BH CV STRESS SPEED STAGE 1: 1.7
BH CV STRESS STAGE 1: 1
BH CV THREE MINUTE POST TECH DATA BLOOD PRESSURE: NORMAL MMHG
BH CV THREE MINUTE POST TECH DATA HEART RATE: 90 BPM
BH CV THREE MINUTE POST TECH DATA OXYGEN SATURATION: 98 %
LV EF NUC BP: 27 %
MAXIMAL PREDICTED HEART RATE: 163 BPM
PERCENT MAX PREDICTED HR: 64.42 %
STRESS BASELINE BP: NORMAL MMHG
STRESS BASELINE HR: 77 BPM
STRESS O2 SAT REST: 95 %
STRESS PERCENT HR: 76 %
STRESS POST O2 SAT PEAK: 98 %
STRESS POST PEAK BP: NORMAL MMHG
STRESS POST PEAK HR: 105 BPM
STRESS TARGET HR: 139 BPM

## 2022-02-25 PROCEDURE — 25010000002 REGADENOSON 0.4 MG/5ML SOLUTION: Performed by: INTERNAL MEDICINE

## 2022-02-25 PROCEDURE — 93017 CV STRESS TEST TRACING ONLY: CPT

## 2022-02-25 PROCEDURE — 0 TECHNETIUM TETROFOSMIN KIT: Performed by: INTERNAL MEDICINE

## 2022-02-25 PROCEDURE — A9502 TC99M TETROFOSMIN: HCPCS | Performed by: INTERNAL MEDICINE

## 2022-02-25 PROCEDURE — 78452 HT MUSCLE IMAGE SPECT MULT: CPT

## 2022-02-25 RX ORDER — CALCIUM/MAGNESIUM/ZINC 333-133 MG
400 TABLET ORAL DAILY
COMMUNITY

## 2022-02-25 RX ADMIN — REGADENOSON 0.4 MG: 0.08 INJECTION, SOLUTION INTRAVENOUS at 11:34

## 2022-02-25 RX ADMIN — TETROFOSMIN 1 DOSE: 1.38 INJECTION, POWDER, LYOPHILIZED, FOR SOLUTION INTRAVENOUS at 10:25

## 2022-02-25 RX ADMIN — TETROFOSMIN 1 DOSE: 1.38 INJECTION, POWDER, LYOPHILIZED, FOR SOLUTION INTRAVENOUS at 11:35

## 2022-02-25 NOTE — TELEPHONE ENCOUNTER
RANGEL patient. Patient voiced understanding. Patient is agreeable for heart cath on Thursday. Patient IS vaccinated.

## 2022-02-25 NOTE — TELEPHONE ENCOUNTER
----- Message from CATHERINE Hall MD sent at 2/25/2022  2:41 PM EST -----  SPECT showed abnormal findings with severe heart muscle weakness.  This may indicate blocked arteries or may be a primary heart muscle problem.  I recommend a cardiac cath procedure to specifically look at her arteries.  We will also need to adjust medication due to the heart being weak.  I recommend we schedule the cath next week on Thursday.  If she is NOT vaccinated then let me know.  I will put in orders to schedule the cath unless she needs to be seen back in office first.

## 2022-02-25 NOTE — TELEPHONE ENCOUNTER
----- Message from CATHERINE Hall MD sent at 2/25/2022  3:07 PM EST -----  Echo reviewed  Heart function was severely reduced, seen also on stress test  Valve function was good    Cardiac Cath being arranged

## 2022-03-03 ENCOUNTER — HOSPITAL ENCOUNTER (OUTPATIENT)
Facility: HOSPITAL | Age: 57
Setting detail: HOSPITAL OUTPATIENT SURGERY
Discharge: HOME | End: 2022-03-03
Attending: INTERNAL MEDICINE | Admitting: INTERNAL MEDICINE

## 2022-03-03 VITALS
SYSTOLIC BLOOD PRESSURE: 135 MMHG | OXYGEN SATURATION: 97 % | HEART RATE: 74 BPM | TEMPERATURE: 97.4 F | RESPIRATION RATE: 16 BRPM | DIASTOLIC BLOOD PRESSURE: 79 MMHG

## 2022-03-03 DIAGNOSIS — I42.0 CARDIOMYOPATHY, DILATED: ICD-10-CM

## 2022-03-03 DIAGNOSIS — R94.39 ABNORMAL NUCLEAR STRESS TEST: ICD-10-CM

## 2022-03-03 LAB
ANION GAP SERPL CALCULATED.3IONS-SCNC: 11.4 MMOL/L (ref 5–15)
BUN SERPL-MCNC: 7 MG/DL (ref 6–20)
BUN/CREAT SERPL: 12.7 (ref 7–25)
CALCIUM SPEC-SCNC: 9.4 MG/DL (ref 8.6–10.5)
CHLORIDE SERPL-SCNC: 104 MMOL/L (ref 98–107)
CO2 SERPL-SCNC: 24.6 MMOL/L (ref 22–29)
CREAT SERPL-MCNC: 0.55 MG/DL (ref 0.57–1)
DEPRECATED RDW RBC AUTO: 40.9 FL (ref 37–54)
EGFRCR SERPLBLD CKD-EPI 2021: 107.1 ML/MIN/1.73
ERYTHROCYTE [DISTWIDTH] IN BLOOD BY AUTOMATED COUNT: 13.2 % (ref 12.3–15.4)
GLUCOSE SERPL-MCNC: 96 MG/DL (ref 65–99)
HCT VFR BLD AUTO: 37.4 % (ref 34–46.6)
HGB BLD-MCNC: 12.9 G/DL (ref 12–15.9)
INR PPP: 0.99 (ref 2–3)
MCH RBC QN AUTO: 29.7 PG (ref 26.6–33)
MCHC RBC AUTO-ENTMCNC: 34.5 G/DL (ref 31.5–35.7)
MCV RBC AUTO: 86 FL (ref 79–97)
PLATELET # BLD AUTO: 301 10*3/MM3 (ref 140–450)
PMV BLD AUTO: 10 FL (ref 6–12)
POTASSIUM SERPL-SCNC: 3.6 MMOL/L (ref 3.5–5.2)
PROTHROMBIN TIME: 10.4 SECONDS (ref 9.4–12)
QT INTERVAL: 456 MS
RBC # BLD AUTO: 4.35 10*6/MM3 (ref 3.77–5.28)
SODIUM SERPL-SCNC: 140 MMOL/L (ref 136–145)
WBC NRBC COR # BLD: 4.85 10*3/MM3 (ref 3.4–10.8)

## 2022-03-03 PROCEDURE — 99152 MOD SED SAME PHYS/QHP 5/>YRS: CPT | Performed by: INTERNAL MEDICINE

## 2022-03-03 PROCEDURE — 25010000002 FENTANYL CITRATE (PF) 100 MCG/2ML SOLUTION: Performed by: INTERNAL MEDICINE

## 2022-03-03 PROCEDURE — 93458 L HRT ARTERY/VENTRICLE ANGIO: CPT | Performed by: INTERNAL MEDICINE

## 2022-03-03 PROCEDURE — 85610 PROTHROMBIN TIME: CPT | Performed by: INTERNAL MEDICINE

## 2022-03-03 PROCEDURE — 85027 COMPLETE CBC AUTOMATED: CPT | Performed by: INTERNAL MEDICINE

## 2022-03-03 PROCEDURE — 93005 ELECTROCARDIOGRAM TRACING: CPT | Performed by: INTERNAL MEDICINE

## 2022-03-03 PROCEDURE — 99153 MOD SED SAME PHYS/QHP EA: CPT | Performed by: INTERNAL MEDICINE

## 2022-03-03 PROCEDURE — 0 IOPAMIDOL PER 1 ML: Performed by: INTERNAL MEDICINE

## 2022-03-03 PROCEDURE — 80048 BASIC METABOLIC PNL TOTAL CA: CPT | Performed by: INTERNAL MEDICINE

## 2022-03-03 PROCEDURE — 25010000002 MIDAZOLAM PER 1 MG: Performed by: INTERNAL MEDICINE

## 2022-03-03 PROCEDURE — 93010 ELECTROCARDIOGRAM REPORT: CPT | Performed by: INTERNAL MEDICINE

## 2022-03-03 PROCEDURE — 25010000002 HEPARIN (PORCINE) PER 1000 UNITS: Performed by: INTERNAL MEDICINE

## 2022-03-03 PROCEDURE — C1769 GUIDE WIRE: HCPCS | Performed by: INTERNAL MEDICINE

## 2022-03-03 PROCEDURE — C1894 INTRO/SHEATH, NON-LASER: HCPCS | Performed by: INTERNAL MEDICINE

## 2022-03-03 RX ORDER — SACUBITRIL AND VALSARTAN 24; 26 MG/1; MG/1
1 TABLET, FILM COATED ORAL 2 TIMES DAILY
Qty: 180 TABLET | Refills: 3 | Status: SHIPPED | OUTPATIENT
Start: 2022-03-03 | End: 2022-03-22

## 2022-03-03 RX ORDER — LIDOCAINE HYDROCHLORIDE 20 MG/ML
INJECTION, SOLUTION INFILTRATION; PERINEURAL AS NEEDED
Status: DISCONTINUED | OUTPATIENT
Start: 2022-03-03 | End: 2022-03-03 | Stop reason: HOSPADM

## 2022-03-03 RX ORDER — SODIUM CHLORIDE 9 MG/ML
50 INJECTION, SOLUTION INTRAVENOUS CONTINUOUS
Status: DISCONTINUED | OUTPATIENT
Start: 2022-03-03 | End: 2022-03-03 | Stop reason: HOSPADM

## 2022-03-03 RX ORDER — ELETRIPTAN HYDROBROMIDE 40 MG/1
40 TABLET, FILM COATED ORAL ONCE AS NEEDED
COMMUNITY

## 2022-03-03 RX ORDER — FENTANYL CITRATE 50 UG/ML
INJECTION, SOLUTION INTRAMUSCULAR; INTRAVENOUS AS NEEDED
Status: DISCONTINUED | OUTPATIENT
Start: 2022-03-03 | End: 2022-03-03 | Stop reason: HOSPADM

## 2022-03-03 RX ORDER — HEPARIN SODIUM 1000 [USP'U]/ML
INJECTION, SOLUTION INTRAVENOUS; SUBCUTANEOUS AS NEEDED
Status: DISCONTINUED | OUTPATIENT
Start: 2022-03-03 | End: 2022-03-03 | Stop reason: HOSPADM

## 2022-03-03 RX ORDER — MIDAZOLAM HYDROCHLORIDE 1 MG/ML
INJECTION INTRAMUSCULAR; INTRAVENOUS AS NEEDED
Status: DISCONTINUED | OUTPATIENT
Start: 2022-03-03 | End: 2022-03-03 | Stop reason: HOSPADM

## 2022-03-03 RX ORDER — VERAPAMIL HYDROCHLORIDE 2.5 MG/ML
INJECTION, SOLUTION INTRAVENOUS AS NEEDED
Status: DISCONTINUED | OUTPATIENT
Start: 2022-03-03 | End: 2022-03-03 | Stop reason: HOSPADM

## 2022-03-03 RX ORDER — ACETAMINOPHEN 325 MG/1
650 TABLET ORAL EVERY 4 HOURS PRN
Status: DISCONTINUED | OUTPATIENT
Start: 2022-03-03 | End: 2022-03-03 | Stop reason: HOSPADM

## 2022-03-03 RX ADMIN — ACETAMINOPHEN 650 MG: 325 TABLET ORAL at 12:34

## 2022-03-03 RX ADMIN — SODIUM CHLORIDE 50 ML/HR: 9 INJECTION, SOLUTION INTRAVENOUS at 10:52

## 2022-03-03 NOTE — NURSING NOTE
Patient here today as outpatient for cardiac cath with Dr Hall, consent obtained. Patient went down for procedure in cath lab then returned to floor. Once patient returned to floor patient had TR band on right radial, site assessed and tr band removed per order/protocol. Patient tolerated well, no bleeding noted. Patient ambulated in room and tolerating foods. Patient and family provided with education verbally/written for after care of radial site, both verbalized understanding.

## 2022-03-03 NOTE — INTERVAL H&P NOTE
H&P reviewed. The patient was examined and there are no changes to the H&P.      Proceed with [facility]    Juan Hall MD

## 2022-03-14 ENCOUNTER — TELEPHONE (OUTPATIENT)
Dept: CARDIOLOGY | Facility: CLINIC | Age: 57
End: 2022-03-14

## 2022-03-14 NOTE — TELEPHONE ENCOUNTER
----- Message from CATHERINE Hall MD sent at 3/14/2022  7:34 AM EDT -----  Heart monitor showed Normal rhythm predominately  Rarely did she have single irregular beats  The symptoms she reported did not corellate with cardiac arrhythmia    Noted recently her heart function is weak and cath did not show blocked arteries    Follow up with me (or Avelina) within a couple weeks to follow up on her recent cath and medication adjustment

## 2022-03-22 ENCOUNTER — OFFICE VISIT (OUTPATIENT)
Dept: CARDIOLOGY | Facility: CLINIC | Age: 57
End: 2022-03-22

## 2022-03-22 VITALS
SYSTOLIC BLOOD PRESSURE: 160 MMHG | WEIGHT: 204 LBS | HEART RATE: 74 BPM | DIASTOLIC BLOOD PRESSURE: 97 MMHG | HEIGHT: 63 IN | BODY MASS INDEX: 36.14 KG/M2

## 2022-03-22 DIAGNOSIS — I10 ESSENTIAL HYPERTENSION: ICD-10-CM

## 2022-03-22 DIAGNOSIS — R42 DIZZINESS: ICD-10-CM

## 2022-03-22 DIAGNOSIS — I42.0 CARDIOMYOPATHY, DILATED: Primary | ICD-10-CM

## 2022-03-22 PROCEDURE — 99214 OFFICE O/P EST MOD 30 MIN: CPT | Performed by: NURSE PRACTITIONER

## 2022-03-22 RX ORDER — SPIRONOLACTONE 25 MG/1
25 TABLET ORAL DAILY
Qty: 30 TABLET | Refills: 11 | Status: SHIPPED | OUTPATIENT
Start: 2022-03-22 | End: 2022-08-19 | Stop reason: SDUPTHER

## 2022-03-22 NOTE — PROGRESS NOTES
"Chief Complaint  Hyperlipidemia, Hypertension, and Cardiomyopathy    Subjective            Pam Schmitt presents to [facility] MEDICAL [facility] CARDIOLOGY  History of Present Illness    Pam Schmitt is a 57-year-old white/ female here today for follow-up after recent heart catheterization.  Ms. schmitt was having some ongoing chest discomfort as well as dizzy spells.  She had a stress test done which showed severely reduced left ventricular systolic function, calculated EF 27%, subsequently she underwent a heart cath which found normal coronary arteries with severe left ventricular dysfunction, EF on that study was 25 to 30% with global hypokinesis.  She was started on guideline directed medical therapy including Entresto and beta-blocker.  Today, Pam is overall feeling well.  Her dizzy spells have improved as well as chest discomfort.  She does have occasional \"pinching\" feeling in her chest.  She has been compliant with her medications.  She reports no signs of fluid weight gain or swelling.  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    [employer]  Past Medical History:   Diagnosis Date   • Allergic rhinitis due to allergen 01/08/2021   • Ankle pain    • Arthritis    • B12 deficiency 02/22/2019   • Bladder disorder    • Condition not found     Ulcer   • Cramps of lower extremity    • Folic acid deficiency 02/22/2019   • Foot pain, left 03/20/2018   • Gastroesophageal reflux    • High cholesterol    • Hypertension    • Iron deficiency 02/22/2019   • Kidney problem          SURGICALHX  Past Surgical History:   Procedure Laterality Date   • CARDIAC CATHETERIZATION N/A 3/3/2022    Procedure: Left Heart Cath - right radial;  Surgeon: CATHERINE Hall MD;  Location: [facility] CATH INVASIVE LOCATION;  Service: Cardiology;  Laterality: N/A;   • HIATAL HERNIA REPAIR  02/2019   • HYSTERECTOMY     • INCONTINENCE SURGERY     • LAPAROSCOPIC CHOLECYSTECTOMY  02/2019   • OTHER SURGICAL HISTORY      Artificial Joints/Limbs   • " OTHER SURGICAL HISTORY      Joint surgery   • REVISION / REMOVAL NEUROSTIMULATOR     • TOTAL KNEE ARTHROPLASTY Left         SOC  Social History     Socioeconomic History   • Marital status:    Tobacco Use   • Smoking status: Never Smoker   • Smokeless tobacco: Never Used   Vaping Use   • Vaping Use: Never used   Substance and Sexual Activity   • Alcohol use: Yes     Comment: Current some day occasionally drinks, has been drinking for 6-10 years   • Drug use: Never   • Sexual activity: Defer         FAMHX  Family History   Problem Relation Age of Onset   • Cancer Sister    • Cancer Daughter    • Arthritis Daughter           ALLERGY  Allergies   Allergen Reactions   • Lortab [Hydrocodone-Acetaminophen] Itching        MEDSCURRENT    Current Outpatient Medications:   •  albuterol sulfate  (90 Base) MCG/ACT inhaler, Take 1-2 Puffs every 4 hours as needed (Patient taking differently: Take 1-2 Puffs every 4 hours as needed), Disp: 8.5 g, Rfl: 1  •  ascorbic acid (VITAMIN C) 250 MG tablet, Take 250 mg by mouth Daily., Disp: , Rfl:   •  benzonatate (TESSALON) 200 MG capsule, Take 1 capsule by mouth 3 (Three) Times a Day As Needed for Cough., Disp: 90 capsule, Rfl: 1  •  Cholecalciferol (Vitamin D3) 50 MCG (2000 UT) capsule, Take 1 capsule by mouth Daily., Disp: 90 capsule, Rfl: 1  •  cyanocobalamin 1000 MCG/ML injection, Inject 1 mL into the appropriate muscle as directed by prescriber Every 30 (Thirty) Days., Disp: 1 mL, Rfl: 5  •  Diclofenac Sodium (VOLTAREN) 1 % gel gel, Apply 4 g topically to the appropriate area as directed 4 (Four) Times a Day As Needed (AS NEEDED)., Disp: 100 g, Rfl: 1  •  Echinacea 400 MG capsule, Take 400 mg by mouth Daily., Disp: , Rfl:   •  eletriptan (RELPAX) 40 MG tablet, Take 40 mg by mouth 1 (One) Time As Needed for Migraine. may repeat in 2 hours if necessary, Disp: , Rfl:   •  fluticasone (FLONASE) 50 MCG/ACT nasal spray, Instill 2 sprays into the nostril(s) as directed by  "provider Daily., Disp: 16 g, Rfl: 1  •  folic acid (FOLVITE) 1 MG tablet, Take 1 mg by mouth Daily., Disp: , Rfl:   •  loratadine (CLARITIN) 10 MG tablet, Take 1 tablet by mouth Daily., Disp: 90 tablet, Rfl: 1  •  metoprolol succinate XL (TOPROL-XL) 25 MG 24 hr tablet, Take 1 tablet by mouth Daily., Disp: 90 tablet, Rfl: 1  •  montelukast (SINGULAIR) 10 MG tablet, Take 1 tablet by mouth Every Night., Disp: 90 tablet, Rfl: 1  •  Multiple Vitamins-Minerals (b complex-C-E-zinc) tablet, Take 1 tablet by mouth Daily., Disp: , Rfl:   •  omeprazole (priLOSEC) 40 MG capsule, Take 1 capsule by mouth Daily., Disp: 90 capsule, Rfl: 1  •  promethazine-dextromethorphan (PROMETHAZINE-DM) 6.25-15 MG/5ML syrup, Take 5 mL by mouth 4 (Four) Times a Day As Needed for Cough., Disp: 473 mL, Rfl: 0  •  valACYclovir (VALTREX) 500 MG tablet, Take 1 tablet by mouth 2 (Two) Times a Day. (Patient taking differently: Take 500 mg by mouth 2 (Two) Times a Day As Needed.), Disp: 60 tablet, Rfl: 1  •  sacubitril-valsartan (ENTRESTO) 49-51 MG tablet, Take 1 tablet by mouth 2 (Two) Times a Day., Disp: 60 tablet, Rfl: 11  •  spironolactone (ALDACTONE) 25 MG tablet, Take 1 tablet by mouth Daily., Disp: 30 tablet, Rfl: 11      Review of Systems   Constitutional: Negative for malaise/fatigue and weight gain.   Cardiovascular: Negative for chest pain, dyspnea on exertion, irregular heartbeat, leg swelling, near-syncope, orthopnea and palpitations.   Respiratory: Negative for shortness of breath.    Neurological: Positive for dizziness.        Objective     /97   Pulse 74   Ht 160 cm (63\")   Wt 92.5 kg (204 lb)   BMI 36.14 kg/m²       General Appearance:   · well developed  · well nourished  HENT:   · oropharynx moist  · lips not cyanotic  Neck:  · thyroid not enlarged  · supple  Respiratory:  · no respiratory distress  · normal breath sounds  · no rales  Cardiovascular:  · no jugular venous distention  · regular rhythm  · apical impulse " normal  · S1 normal, S2 normal  · no S3, no S4   · no murmur  · no rub, no thrill  · carotid pulses normal; no bruit  · pedal pulses normal  · lower extremity edema: none    Musculoskeletal:  · no clubbing of fingers.   · normocephalic, head atraumatic  Skin:   · warm, dry  Psychiatric:  · judgement and insight appropriate  · normal mood and affect      Result Review :         CBC    CBC 7/8/21 3/3/22   WBC 5.16 4.85   RBC 4.68 4.35   Hemoglobin 13.6 12.9   Hematocrit 41.7 37.4   MCV 89.1 86.0   MCH 29.1 29.7   MCHC 32.6 34.5   RDW 12.7 13.2   Platelets 262 301           BMP    BMP 7/8/21 3/3/22   BUN 13 7   Creatinine 0.77 0.55 (A)   Sodium 141 140   Potassium 4.2 3.6   Chloride 104 104   CO2 25.7 24.6   Calcium 9.5 9.4   (A) Abnormal value              Data reviewed: Cardiology studies Stress testing and heart cath reviewed as above.  Laboratory studies reviewed as above. and Cardiac event monitor recently showed normal sinus rhythm, average heart rate 99, rare PAC/PVC, one 6 beat atrial run.     Procedures      Pam Cade  reports that she has never smoked. She has never used smokeless tobacco.. I have educated her on the risk of diseases from using tobacco products such as cancer, COPD and heart disease.              Assessment and Plan        ASSESSMENT:  Encounter Diagnoses   Name Primary?   • Cardiomyopathy, dilated (HCC) Yes   • Essential hypertension    • Dizziness          PLAN:    1.  Dilated cardiomyopathy-increasing Entresto today.  Continue beta-blocker at current dose.  Also adding on Aldactone.  BMP in 2 weeks.  Will need to repeat echocardiogram after optimization of medical therapy, if EF remains significantly reduced will be considered for CRT-D implant.   2.  Hypertension-elevated in office today.  Medication increases should also help with this.  3.  Dizziness-improved.        Pam is agreeable to the plan of care and will follow up in about 3 months unless problems arise.          Patient was  given instructions and counseling regarding her condition or for health maintenance advice. Please see specific information pulled into the AVS if appropriate.           Anna Soto, APRN   3/22/2022  12:25 EDT

## 2022-03-24 ENCOUNTER — TELEPHONE (OUTPATIENT)
Dept: CARDIOLOGY | Facility: CLINIC | Age: 57
End: 2022-03-24

## 2022-04-06 ENCOUNTER — OFFICE VISIT (OUTPATIENT)
Dept: FAMILY MEDICINE CLINIC | Facility: CLINIC | Age: 57
End: 2022-04-06

## 2022-04-06 VITALS
DIASTOLIC BLOOD PRESSURE: 76 MMHG | HEIGHT: 63 IN | OXYGEN SATURATION: 96 % | HEART RATE: 87 BPM | SYSTOLIC BLOOD PRESSURE: 116 MMHG | BODY MASS INDEX: 36.14 KG/M2 | WEIGHT: 204 LBS | TEMPERATURE: 96 F

## 2022-04-06 DIAGNOSIS — Z13.29 SCREENING FOR THYROID DISORDER: ICD-10-CM

## 2022-04-06 DIAGNOSIS — I10 PRIMARY HYPERTENSION: ICD-10-CM

## 2022-04-06 DIAGNOSIS — M79.10 MYALGIA: ICD-10-CM

## 2022-04-06 DIAGNOSIS — I42.0 CARDIOMYOPATHY, DILATED: ICD-10-CM

## 2022-04-06 DIAGNOSIS — E78.2 MIXED HYPERLIPIDEMIA: ICD-10-CM

## 2022-04-06 DIAGNOSIS — J30.9 ALLERGIC RHINITIS, UNSPECIFIED SEASONALITY, UNSPECIFIED TRIGGER: ICD-10-CM

## 2022-04-06 DIAGNOSIS — D64.9 ANEMIA, UNSPECIFIED TYPE: ICD-10-CM

## 2022-04-06 DIAGNOSIS — K21.9 GASTROESOPHAGEAL REFLUX DISEASE WITHOUT ESOPHAGITIS: ICD-10-CM

## 2022-04-06 DIAGNOSIS — E53.8 B12 DEFICIENCY: ICD-10-CM

## 2022-04-06 DIAGNOSIS — E53.8 FOLIC ACID DEFICIENCY: ICD-10-CM

## 2022-04-06 PROBLEM — E78.00 HIGH CHOLESTEROL: Status: RESOLVED | Noted: 2021-07-08 | Resolved: 2022-04-06

## 2022-04-06 LAB
ALBUMIN SERPL-MCNC: 4.3 G/DL (ref 3.5–5.2)
ALBUMIN/GLOB SERPL: 1.5 G/DL
ALP SERPL-CCNC: 96 U/L (ref 39–117)
ALT SERPL W P-5'-P-CCNC: 16 U/L (ref 1–33)
ANION GAP SERPL CALCULATED.3IONS-SCNC: 7.7 MMOL/L (ref 5–15)
AST SERPL-CCNC: 18 U/L (ref 1–32)
BASOPHILS # BLD AUTO: 0.08 10*3/MM3 (ref 0–0.2)
BASOPHILS NFR BLD AUTO: 1.2 % (ref 0–1.5)
BILIRUB SERPL-MCNC: 0.4 MG/DL (ref 0–1.2)
BUN SERPL-MCNC: 10 MG/DL (ref 6–20)
BUN/CREAT SERPL: 15.2 (ref 7–25)
CALCIUM SPEC-SCNC: 9.8 MG/DL (ref 8.6–10.5)
CHLORIDE SERPL-SCNC: 104 MMOL/L (ref 98–107)
CHOLEST SERPL-MCNC: 220 MG/DL (ref 0–200)
CO2 SERPL-SCNC: 28.3 MMOL/L (ref 22–29)
CREAT SERPL-MCNC: 0.66 MG/DL (ref 0.57–1)
DEPRECATED RDW RBC AUTO: 43.6 FL (ref 37–54)
EGFRCR SERPLBLD CKD-EPI 2021: 102.5 ML/MIN/1.73
EOSINOPHIL # BLD AUTO: 0.07 10*3/MM3 (ref 0–0.4)
EOSINOPHIL NFR BLD AUTO: 1 % (ref 0.3–6.2)
ERYTHROCYTE [DISTWIDTH] IN BLOOD BY AUTOMATED COUNT: 13.6 % (ref 12.3–15.4)
FERRITIN SERPL-MCNC: 156 NG/ML (ref 13–150)
FOLATE SERPL-MCNC: >20 NG/ML (ref 4.78–24.2)
GLOBULIN UR ELPH-MCNC: 2.9 GM/DL
GLUCOSE SERPL-MCNC: 96 MG/DL (ref 65–99)
HCT VFR BLD AUTO: 40.3 % (ref 34–46.6)
HDLC SERPL-MCNC: 68 MG/DL (ref 40–60)
HGB BLD-MCNC: 13.2 G/DL (ref 12–15.9)
IMM GRANULOCYTES # BLD AUTO: 0.02 10*3/MM3 (ref 0–0.05)
IMM GRANULOCYTES NFR BLD AUTO: 0.3 % (ref 0–0.5)
IRON 24H UR-MRATE: 94 MCG/DL (ref 37–145)
IRON SATN MFR SERPL: 25 % (ref 20–50)
LDLC SERPL CALC-MCNC: 136 MG/DL (ref 0–100)
LDLC/HDLC SERPL: 1.96 {RATIO}
LYMPHOCYTES # BLD AUTO: 1.94 10*3/MM3 (ref 0.7–3.1)
LYMPHOCYTES NFR BLD AUTO: 28.5 % (ref 19.6–45.3)
MAGNESIUM SERPL-MCNC: 2.2 MG/DL (ref 1.6–2.6)
MCH RBC QN AUTO: 28.8 PG (ref 26.6–33)
MCHC RBC AUTO-ENTMCNC: 32.8 G/DL (ref 31.5–35.7)
MCV RBC AUTO: 87.8 FL (ref 79–97)
MONOCYTES # BLD AUTO: 0.57 10*3/MM3 (ref 0.1–0.9)
MONOCYTES NFR BLD AUTO: 8.4 % (ref 5–12)
NEUTROPHILS NFR BLD AUTO: 4.13 10*3/MM3 (ref 1.7–7)
NEUTROPHILS NFR BLD AUTO: 60.6 % (ref 42.7–76)
NRBC BLD AUTO-RTO: 0 /100 WBC (ref 0–0.2)
PLATELET # BLD AUTO: 315 10*3/MM3 (ref 140–450)
PMV BLD AUTO: 10.2 FL (ref 6–12)
POTASSIUM SERPL-SCNC: 4.5 MMOL/L (ref 3.5–5.2)
PROT SERPL-MCNC: 7.2 G/DL (ref 6–8.5)
RBC # BLD AUTO: 4.59 10*6/MM3 (ref 3.77–5.28)
SODIUM SERPL-SCNC: 140 MMOL/L (ref 136–145)
TIBC SERPL-MCNC: 375 MCG/DL (ref 298–536)
TRANSFERRIN SERPL-MCNC: 252 MG/DL (ref 200–360)
TRIGL SERPL-MCNC: 93 MG/DL (ref 0–150)
TSH SERPL DL<=0.05 MIU/L-ACNC: 1.81 UIU/ML (ref 0.27–4.2)
VIT B12 BLD-MCNC: >2000 PG/ML (ref 211–946)
VLDLC SERPL-MCNC: 16 MG/DL (ref 5–40)
WBC NRBC COR # BLD: 6.81 10*3/MM3 (ref 3.4–10.8)

## 2022-04-06 PROCEDURE — 82746 ASSAY OF FOLIC ACID SERUM: CPT | Performed by: NURSE PRACTITIONER

## 2022-04-06 PROCEDURE — 83735 ASSAY OF MAGNESIUM: CPT | Performed by: NURSE PRACTITIONER

## 2022-04-06 PROCEDURE — 82607 VITAMIN B-12: CPT | Performed by: NURSE PRACTITIONER

## 2022-04-06 PROCEDURE — 96372 THER/PROPH/DIAG INJ SC/IM: CPT | Performed by: NURSE PRACTITIONER

## 2022-04-06 PROCEDURE — 82728 ASSAY OF FERRITIN: CPT | Performed by: NURSE PRACTITIONER

## 2022-04-06 PROCEDURE — 83540 ASSAY OF IRON: CPT | Performed by: NURSE PRACTITIONER

## 2022-04-06 PROCEDURE — 80061 LIPID PANEL: CPT | Performed by: NURSE PRACTITIONER

## 2022-04-06 PROCEDURE — 84466 ASSAY OF TRANSFERRIN: CPT | Performed by: NURSE PRACTITIONER

## 2022-04-06 PROCEDURE — 99214 OFFICE O/P EST MOD 30 MIN: CPT | Performed by: NURSE PRACTITIONER

## 2022-04-06 PROCEDURE — 80050 GENERAL HEALTH PANEL: CPT | Performed by: NURSE PRACTITIONER

## 2022-04-06 RX ORDER — CYANOCOBALAMIN 1000 UG/ML
1000 INJECTION, SOLUTION INTRAMUSCULAR; SUBCUTANEOUS ONCE
Status: COMPLETED | OUTPATIENT
Start: 2022-04-06 | End: 2022-04-06

## 2022-04-06 RX ORDER — CETIRIZINE HYDROCHLORIDE 10 MG/1
10 TABLET ORAL
Qty: 90 TABLET | Refills: 1 | Status: SHIPPED | OUTPATIENT
Start: 2022-04-06

## 2022-04-06 RX ORDER — MONTELUKAST SODIUM 10 MG/1
10 TABLET ORAL NIGHTLY
Qty: 90 TABLET | Refills: 1 | Status: SHIPPED | OUTPATIENT
Start: 2022-04-06

## 2022-04-06 RX ADMIN — CYANOCOBALAMIN 1000 MCG: 1000 INJECTION, SOLUTION INTRAMUSCULAR; SUBCUTANEOUS at 15:49

## 2022-04-06 NOTE — PROGRESS NOTES
Follow Up Office Visit      Patient Name: Pam Cade  : 1965   MRN: [id number]     Chief Complaint:    Chief Complaint   Patient presents with   • Headache   • Hypertension   • Hyperlipidemia       History of Present Illness: Pam Cade is a 57 y.o. female who is here today to follow up for HTN,Hyperlipidemia,GERD,allergies,migraines, cardiomyopathy       Pt c/o leg cramps since starting aldactone with cardiology feels its related to her potassium level  Also c/o pnd, nasal discharge using Flonase and Claritin daily without relief      Pap   Mammogram-  Colonoscopy 2018    Subjective      Review of Systems:   Review of Systems   Constitutional: Negative for fever.   HENT: Positive for postnasal drip and rhinorrhea. Negative for ear pain, sinus pain and sore throat.    Respiratory: Positive for shortness of breath. Negative for cough.    Cardiovascular: Negative for chest pain.   Gastrointestinal: Negative for abdominal pain, diarrhea, nausea and vomiting.   Genitourinary: Negative for dysuria.   Musculoskeletal: Positive for myalgias.   Neurological: Negative for headaches.   Psychiatric/Behavioral: Negative for sleep disturbance.        Past Medical History:   Past Medical History:   Diagnosis Date   • Allergic rhinitis due to allergen 2021   • Ankle pain    • Arthritis    • B12 deficiency 2019   • Bladder disorder    • Condition not found     Ulcer   • Cramps of lower extremity    • Folic acid deficiency 2019   • Foot pain, left 2018   • Gastroesophageal reflux    • High cholesterol    • Hypertension    • Iron deficiency 2019   • Kidney problem        Past Surgical History:   Past Surgical History:   Procedure Laterality Date   • CARDIAC CATHETERIZATION N/A 3/3/2022    Procedure: Left Heart Cath - right radial;  Surgeon: CATHERINE Hall MD;  Location: [facility] INVASIVE LOCATION;  Service: Cardiology;  Laterality: N/A;   • HIATAL HERNIA REPAIR  2019   •  HYSTERECTOMY     • INCONTINENCE SURGERY     • LAPAROSCOPIC CHOLECYSTECTOMY  02/2019   • OTHER SURGICAL HISTORY      Artificial Joints/Limbs   • OTHER SURGICAL HISTORY      Joint surgery   • REVISION / REMOVAL NEUROSTIMULATOR     • TOTAL KNEE ARTHROPLASTY Left        Family History:   Family History   Problem Relation Age of Onset   • Cancer Sister    • Cancer Daughter    • Arthritis Daughter        Social History:   Social History     Socioeconomic History   • Marital status:    Tobacco Use   • Smoking status: Never Smoker   • Smokeless tobacco: Never Used   Vaping Use   • Vaping Use: Never used   Substance and Sexual Activity   • Alcohol use: Yes     Comment: Current some day occasionally drinks, has been drinking for 6-10 years   • Drug use: Never   • Sexual activity: Defer       Medications:     Current Outpatient Medications:   •  montelukast (SINGULAIR) 10 MG tablet, Take 1 tablet by mouth Every Night., Disp: 90 tablet, Rfl: 1  •  albuterol sulfate  (90 Base) MCG/ACT inhaler, Take 1-2 Puffs every 4 hours as needed (Patient taking differently: Take 1-2 Puffs every 4 hours as needed), Disp: 8.5 g, Rfl: 1  •  ascorbic acid (VITAMIN C) 250 MG tablet, Take 250 mg by mouth Daily., Disp: , Rfl:   •  Cetirizine HCl 10 MG capsule, Take 10 mg by mouth every night at bedtime., Disp: 90 capsule, Rfl: 1  •  Cholecalciferol (Vitamin D3) 50 MCG (2000 UT) capsule, Take 1 capsule by mouth Daily., Disp: 90 capsule, Rfl: 1  •  cyanocobalamin 1000 MCG/ML injection, Inject 1 mL into the appropriate muscle as directed by prescriber Every 30 (Thirty) Days., Disp: 1 mL, Rfl: 5  •  Echinacea 400 MG capsule, Take 400 mg by mouth Daily., Disp: , Rfl:   •  eletriptan (RELPAX) 40 MG tablet, Take 40 mg by mouth 1 (One) Time As Needed for Migraine. may repeat in 2 hours if necessary, Disp: , Rfl:   •  fluticasone (FLONASE) 50 MCG/ACT nasal spray, Instill 2 sprays into the nostril(s) as directed by provider Daily., Disp: 16 g,  "Rfl: 1  •  folic acid (FOLVITE) 1 MG tablet, Take 1 mg by mouth Daily., Disp: , Rfl:   •  metoprolol succinate XL (TOPROL-XL) 25 MG 24 hr tablet, Take 1 tablet by mouth Daily., Disp: 90 tablet, Rfl: 1  •  Multiple Vitamins-Minerals (b complex-C-E-zinc) tablet, Take 1 tablet by mouth Daily., Disp: , Rfl:   •  sacubitril-valsartan (ENTRESTO) 49-51 MG tablet, Take 1 tablet by mouth 2 (Two) Times a Day., Disp: 60 tablet, Rfl: 11  •  spironolactone (ALDACTONE) 25 MG tablet, Take 1 tablet by mouth Daily., Disp: 30 tablet, Rfl: 11  •  valACYclovir (VALTREX) 500 MG tablet, Take 1 tablet by mouth 2 (Two) Times a Day. (Patient taking differently: Take 500 mg by mouth 2 (Two) Times a Day As Needed.), Disp: 60 tablet, Rfl: 1  No current facility-administered medications for this visit.    Allergies:   Allergies   Allergen Reactions   • Lortab [Hydrocodone-Acetaminophen] Itching           Objective     Physical Exam:  Vital Signs:   Vitals:    04/06/22 1537   BP: 116/76   Pulse: 87   Temp: 96 °F (35.6 °C)   SpO2: 96%   Weight: 92.5 kg (204 lb)   Height: 160 cm (63\")     Body mass index is 36.14 kg/m².     Physical Exam  HENT:      Right Ear: Tympanic membrane normal.      Left Ear: Tympanic membrane normal.      Nose: Congestion and rhinorrhea present.      Mouth/Throat:      Mouth: Mucous membranes are moist.   Eyes:      Conjunctiva/sclera: Conjunctivae normal.   Neck:      Vascular: No carotid bruit.   Cardiovascular:      Rate and Rhythm: Normal rate and regular rhythm.      Heart sounds: Normal heart sounds. No murmur heard.  Pulmonary:      Effort: Pulmonary effort is normal.      Breath sounds: Normal breath sounds.   Abdominal:      General: Bowel sounds are normal.      Palpations: Abdomen is soft.   Musculoskeletal:      Right lower leg: No edema.      Left lower leg: No edema.   Skin:     General: Skin is warm and dry.   Neurological:      Mental Status: She is alert.   Psychiatric:         Mood and Affect: Mood " "normal.         Behavior: Behavior normal.             Assessment / Plan      Assessment/Plan:   Diagnoses and all orders for this visit:    1. Primary hypertension  -     CBC Auto Differential    2. Mixed hyperlipidemia  -     Comprehensive Metabolic Panel  -     Lipid Panel    3. Cardiomyopathy, dilated (HCC)    4. B12 deficiency  -     cyanocobalamin injection 1,000 mcg  -     Vitamin B12    5. Allergic rhinitis, unspecified seasonality, unspecified trigger    6. Gastroesophageal reflux disease without esophagitis    7. Folic acid deficiency  -     Folate; Future    8. Anemia, unspecified type  -     Iron Profile  -     Ferritin    9. Screening for thyroid disorder  -     TSH    10. Myalgia  -     Magnesium    Other orders  -     montelukast (SINGULAIR) 10 MG tablet; Take 1 tablet by mouth Every Night.  Dispense: 90 tablet; Refill: 1  -     Cetirizine HCl 10 MG capsule; Take 10 mg by mouth every night at bedtime.  Dispense: 90 capsule; Refill: 1    Hypertension cardiomyopathy currently controlled at this time continue to follow-up with cardiology will obtain CMP to monitor electrolyte levels as patient is having muscle cramps we will also obtain magnesium level  B12 deficiency will obtain B12 level folic acid deficiency will obtain folate level  Anemia will obtain iron profile  Allergic rhinitis uncontrolled we will change Claritin to Zyrtec and add Singulair if symptoms persist would refer to allergist patient not keep appointment to ENT with abnormal CT of the sinuses for possible fungal infection states she canceled her appointment and requested Dr. Garcia's office states she will reschedule          Follow Up:   Return in about 6 months (around 10/6/2022).    Caleb Barnard, DORCAS    \"Please note that portions of this note were completed with a voice recognition program.\"    "

## 2022-06-28 ENCOUNTER — OFFICE VISIT (OUTPATIENT)
Dept: FAMILY MEDICINE CLINIC | Facility: CLINIC | Age: 57
End: 2022-06-28

## 2022-06-28 VITALS
WEIGHT: 204 LBS | SYSTOLIC BLOOD PRESSURE: 120 MMHG | HEIGHT: 63 IN | BODY MASS INDEX: 36.14 KG/M2 | DIASTOLIC BLOOD PRESSURE: 84 MMHG | TEMPERATURE: 95.6 F | OXYGEN SATURATION: 100 % | HEART RATE: 75 BPM

## 2022-06-28 DIAGNOSIS — B35.1 NAIL FUNGUS: ICD-10-CM

## 2022-06-28 DIAGNOSIS — F41.0 PANIC ATTACK: ICD-10-CM

## 2022-06-28 DIAGNOSIS — I42.0 CARDIOMYOPATHY, DILATED: ICD-10-CM

## 2022-06-28 DIAGNOSIS — I10 PRIMARY HYPERTENSION: Primary | ICD-10-CM

## 2022-06-28 DIAGNOSIS — J30.9 ALLERGIC RHINITIS, UNSPECIFIED SEASONALITY, UNSPECIFIED TRIGGER: ICD-10-CM

## 2022-06-28 DIAGNOSIS — F41.9 ANXIETY: ICD-10-CM

## 2022-06-28 DIAGNOSIS — F33.0 MILD EPISODE OF RECURRENT MAJOR DEPRESSIVE DISORDER: ICD-10-CM

## 2022-06-28 PROCEDURE — 96372 THER/PROPH/DIAG INJ SC/IM: CPT | Performed by: NURSE PRACTITIONER

## 2022-06-28 PROCEDURE — 99214 OFFICE O/P EST MOD 30 MIN: CPT | Performed by: NURSE PRACTITIONER

## 2022-06-28 RX ORDER — CITALOPRAM 10 MG/1
10 TABLET ORAL DAILY
Qty: 30 TABLET | Refills: 2 | Status: ON HOLD | OUTPATIENT
Start: 2022-06-28 | End: 2022-11-02

## 2022-06-28 RX ORDER — TERBINAFINE HYDROCHLORIDE 250 MG/1
250 TABLET ORAL DAILY
Qty: 90 TABLET | Refills: 0 | Status: ON HOLD | OUTPATIENT
Start: 2022-06-28 | End: 2022-11-02

## 2022-06-28 RX ORDER — METHYLPREDNISOLONE ACETATE 40 MG/ML
40 INJECTION, SUSPENSION INTRA-ARTICULAR; INTRALESIONAL; INTRAMUSCULAR; SOFT TISSUE ONCE
Status: COMPLETED | OUTPATIENT
Start: 2022-06-28 | End: 2022-06-28

## 2022-06-28 RX ORDER — METHYLPREDNISOLONE ACETATE 80 MG/ML
80 INJECTION, SUSPENSION INTRA-ARTICULAR; INTRALESIONAL; INTRAMUSCULAR; SOFT TISSUE ONCE
Status: DISCONTINUED | OUTPATIENT
Start: 2022-06-28 | End: 2022-06-28

## 2022-06-28 RX ADMIN — METHYLPREDNISOLONE ACETATE 40 MG: 40 INJECTION, SUSPENSION INTRA-ARTICULAR; INTRALESIONAL; INTRAMUSCULAR; SOFT TISSUE at 10:31

## 2022-06-28 NOTE — PROGRESS NOTES
ACUTE VISIT     Patient Name: Pam Cade  : 1965   MRN: [id number]     Chief Complaint:    Chief Complaint   Patient presents with   • Anxiety   • Depression   • irritability   • Allergic Rhinitis       History of Present Illness: Pam Cade is a 57 y.o. female who is here today for increased anxiety, depression and irritability that's been going on for a while.       Pt reports anxiety uncontrolled, states when she is doing a close watch recently she had a panic attack  States she suffered a panic attack during the close watch she  Walked out of the room and clocked out, her boss informed her that if she walks off her job again she will be fired  Pt reports anxiety since her 's death, she gets overwhelmed when the patients are talking about death   States her anxiety is controlled until she was required to sit for 12 hours with close watch patients   Pt reports she is required to have a letter stating the close watch Is causing her anxiety and panic attacks   Pt state she has a support group at [facility]     Patient also states she had sinus congestion about 3 weeks ago that has cleared up with otc sinus medication, taking all allergy medications as directed .    Pt c/o fungus right great toe for several weeks would like oral treatment         Subjective      Review of Systems:   Review of Systems   Constitutional: Negative for fatigue.   HENT: Positive for ear pain, rhinorrhea, sinus pressure and sinus pain.    Eyes: Negative for discharge.   Respiratory: Negative for cough.    Cardiovascular: Negative for chest pain.   Gastrointestinal: Negative for abdominal pain, diarrhea, nausea and vomiting.   Genitourinary: Negative for dysuria.   Allergic/Immunologic: Positive for environmental allergies.   Neurological: Positive for dizziness and headaches.   Psychiatric/Behavioral: The patient is nervous/anxious.         Past Medical History:   Past Medical History:   Diagnosis Date   • Allergic rhinitis  due to allergen 01/08/2021   • Ankle pain    • Arthritis    • B12 deficiency 02/22/2019   • Bladder disorder    • Condition not found     Ulcer   • Cramps of lower extremity    • Folic acid deficiency 02/22/2019   • Foot pain, left 03/20/2018   • Gastroesophageal reflux    • High cholesterol    • Hypertension    • Iron deficiency 02/22/2019   • Kidney problem        Past Surgical History:   Past Surgical History:   Procedure Laterality Date   • CARDIAC CATHETERIZATION N/A 3/3/2022    Procedure: Left Heart Cath - right radial;  Surgeon: CATHERINE Hall MD;  Location: [facility] INVASIVE LOCATION;  Service: Cardiology;  Laterality: N/A;   • HIATAL HERNIA REPAIR  02/2019   • HYSTERECTOMY     • INCONTINENCE SURGERY     • LAPAROSCOPIC CHOLECYSTECTOMY  02/2019   • OTHER SURGICAL HISTORY      Artificial Joints/Limbs   • OTHER SURGICAL HISTORY      Joint surgery   • REVISION / REMOVAL NEUROSTIMULATOR     • TOTAL KNEE ARTHROPLASTY Left        Family History:   Family History   Problem Relation Age of Onset   • Cancer Sister    • Cancer Daughter    • Arthritis Daughter        Social History:   Social History     Socioeconomic History   • Marital status:    Tobacco Use   • Smoking status: Never Smoker   • Smokeless tobacco: Never Used   Vaping Use   • Vaping Use: Never used   Substance and Sexual Activity   • Alcohol use: Yes     Comment: Current some day occasionally drinks, has been drinking for 6-10 years   • Drug use: Never   • Sexual activity: Defer       Medications:     Current Outpatient Medications:   •  albuterol sulfate  (90 Base) MCG/ACT inhaler, Take 1-2 Puffs every 4 hours as needed (Patient taking differently: Take 1-2 Puffs every 4 hours as needed), Disp: 8.5 g, Rfl: 1  •  ascorbic acid (VITAMIN C) 250 MG tablet, Take 250 mg by mouth Daily., Disp: , Rfl:   •  cetirizine (zyrTEC) 10 MG tablet, Take 1 tablet by mouth every night at bedtime., Disp: 90 tablet, Rfl: 1  •  Cholecalciferol (Vitamin D3)  50 MCG (2000 UT) capsule, Take 1 capsule by mouth Daily., Disp: 90 capsule, Rfl: 1  •  cyanocobalamin 1000 MCG/ML injection, Inject 1 mL into the appropriate muscle as directed by prescriber Every 30 (Thirty) Days., Disp: 1 mL, Rfl: 5  •  Echinacea 400 MG capsule, Take 400 mg by mouth Daily., Disp: , Rfl:   •  eletriptan (RELPAX) 40 MG tablet, Take 40 mg by mouth 1 (One) Time As Needed for Migraine. may repeat in 2 hours if necessary, Disp: , Rfl:   •  fluticasone (FLONASE) 50 MCG/ACT nasal spray, Instill 2 sprays into the nostril(s) as directed by provider Daily., Disp: 16 g, Rfl: 1  •  folic acid (FOLVITE) 1 MG tablet, Take 1 mg by mouth Daily., Disp: , Rfl:   •  metoprolol succinate XL (TOPROL-XL) 25 MG 24 hr tablet, Take 1 tablet by mouth Daily., Disp: 90 tablet, Rfl: 1  •  montelukast (SINGULAIR) 10 MG tablet, Take 1 tablet by mouth Every Night., Disp: 90 tablet, Rfl: 1  •  Multiple Vitamins-Minerals (b complex-C-E-zinc) tablet, Take 1 tablet by mouth Daily., Disp: , Rfl:   •  sacubitril-valsartan (ENTRESTO) 49-51 MG tablet, Take 1 tablet by mouth 2 (Two) Times a Day., Disp: 60 tablet, Rfl: 11  •  spironolactone (ALDACTONE) 25 MG tablet, Take 1 tablet by mouth Daily., Disp: 30 tablet, Rfl: 11  •  valACYclovir (VALTREX) 500 MG tablet, Take 1 tablet by mouth 2 (Two) Times a Day. (Patient taking differently: Take 500 mg by mouth 2 (Two) Times a Day As Needed.), Disp: 60 tablet, Rfl: 1  •  citalopram (CeleXA) 10 MG tablet, Take 1 tablet by mouth Daily., Disp: 30 tablet, Rfl: 2  •  terbinafine (LamISIL) 250 MG tablet, Take 1 tablet by mouth Daily., Disp: 90 tablet, Rfl: 0  No current facility-administered medications for this visit.    Allergies:   Allergies   Allergen Reactions   • Lortab [Hydrocodone-Acetaminophen] Itching         Objective     Physical Exam:  Vital Signs:   Vitals:    06/28/22 0924 06/28/22 1001   BP: 142/98 120/84   Pulse: 75    Temp: 95.6 °F (35.3 °C)    SpO2: 100%    Weight: 92.5 kg (204 lb)   "  Height: 160 cm (63\")      Body mass index is 36.14 kg/m².     Physical Exam  HENT:      Right Ear: A middle ear effusion is present.      Left Ear: A middle ear effusion is present.      Nose: Congestion and rhinorrhea present.      Right Turbinates: Enlarged and swollen.      Left Turbinates: Enlarged and swollen.      Mouth/Throat:      Mouth: Mucous membranes are moist.      Comments: PND  Eyes:      Conjunctiva/sclera:      Right eye: Right conjunctiva is injected.      Left eye: Left conjunctiva is injected.   Cardiovascular:      Rate and Rhythm: Normal rate and regular rhythm.      Heart sounds: Normal heart sounds. No murmur heard.  Pulmonary:      Effort: Pulmonary effort is normal.      Breath sounds: Normal breath sounds.   Abdominal:      General: Bowel sounds are normal.      Palpations: Abdomen is soft.   Musculoskeletal:      Right lower leg: No edema.      Left lower leg: No edema.   Lymphadenopathy:      Cervical: No cervical adenopathy.   Skin:     General: Skin is warm and dry.      Comments: Nail fungus right great toe   Neurological:      Mental Status: She is alert.   Psychiatric:         Mood and Affect: Mood normal.         Behavior: Behavior normal.             Assessment / Plan      Assessment/Plan:   Diagnoses and all orders for this visit:    1. Primary hypertension (Primary)    2. Cardiomyopathy, dilated (HCC)    3. Allergic rhinitis, unspecified seasonality, unspecified trigger  -     Ambulatory Referral to Allergy  -     Discontinue: methylPREDNISolone acetate (DEPO-medrol) injection 80 mg  -     methylPREDNISolone acetate (DEPO-medrol) injection 40 mg  -     methylPREDNISolone acetate (DEPO-medrol) injection 40 mg    4. Anxiety    5. Mild episode of recurrent major depressive disorder (HCC)    6. Panic attack    7. Nail fungus    Other orders  -     citalopram (CeleXA) 10 MG tablet; Take 1 tablet by mouth Daily.  Dispense: 30 tablet; Refill: 2  -     terbinafine (LamISIL) 250 MG " tablet; Take 1 tablet by mouth Daily.  Dispense: 90 tablet; Refill: 0       hypertension elevated upon arrival to clinic manual recheck improved recommend exercise 30 minutes daily decrease salt intake follow-up with cardiology as scheduled  Allergic rhinitis uncontrolled on Zyrtec Singulair Flonase request refill to allergist will provide Depo-Medrol 80 in office  Anxiety depression panic uncontrolled we will start Celexa also discussed BuSpar if symptoms persist patient is currently attending counseling group with [facility]  Nail fungus of the right great toe will start Lamisil liver enzymes normal recheck at follow-up appointment 90 days        Follow Up:   Return in about 3 months (around 9/28/2022).    DORCAS Ulrich      Please note that portions of this note were completed with a voice recognition program.

## 2022-08-17 ENCOUNTER — TRANSCRIBE ORDERS (OUTPATIENT)
Dept: ADMINISTRATIVE | Facility: HOSPITAL | Age: 57
End: 2022-08-17

## 2022-08-17 DIAGNOSIS — J34.2 DEVIATED SEPTUM: ICD-10-CM

## 2022-08-17 DIAGNOSIS — E04.1 THYROID NODULE: Primary | ICD-10-CM

## 2022-08-17 DIAGNOSIS — J32.4 CHRONIC PANSINUSITIS: Primary | ICD-10-CM

## 2022-08-19 ENCOUNTER — OFFICE VISIT (OUTPATIENT)
Dept: CARDIOLOGY | Facility: CLINIC | Age: 57
End: 2022-08-19

## 2022-08-19 VITALS
HEIGHT: 64 IN | HEART RATE: 79 BPM | BODY MASS INDEX: 36.54 KG/M2 | WEIGHT: 214 LBS | DIASTOLIC BLOOD PRESSURE: 82 MMHG | SYSTOLIC BLOOD PRESSURE: 137 MMHG

## 2022-08-19 DIAGNOSIS — I42.0 DILATED CARDIOMYOPATHY: Primary | ICD-10-CM

## 2022-08-19 DIAGNOSIS — I10 ESSENTIAL HYPERTENSION: ICD-10-CM

## 2022-08-19 DIAGNOSIS — I44.7 LBBB (LEFT BUNDLE BRANCH BLOCK): ICD-10-CM

## 2022-08-19 PROCEDURE — 99214 OFFICE O/P EST MOD 30 MIN: CPT | Performed by: INTERNAL MEDICINE

## 2022-08-19 RX ORDER — SPIRONOLACTONE 25 MG/1
25 TABLET ORAL DAILY
Qty: 90 TABLET | Refills: 3 | Status: SHIPPED | OUTPATIENT
Start: 2022-08-19

## 2022-08-19 NOTE — PROGRESS NOTES
Chief Complaint  Cardiomyopathy, Hypertension, and Dizziness    Subjective            Pam Cade presents to [facility] CARDIOLOGY  History of Present Illness    Pam is here for follow-up evaluation management of dilated cardiomyopathy, nonischemic, hypertension, left bundle branch block.  Since last visit she is feeling much better.  She has had titration of her guideline directed medical therapy.  She denies fluid weight gain, chest pain or excessive shortness of breath.    [facility]  Past Medical History:   Diagnosis Date   • Allergic rhinitis due to allergen 01/08/2021   • Ankle pain    • Arthritis    • B12 deficiency 02/22/2019   • Bladder disorder    • Condition not found     Ulcer   • Cramps of lower extremity    • Folic acid deficiency 02/22/2019   • Foot pain, left 03/20/2018   • Gastroesophageal reflux    • High cholesterol    • Hypertension    • Iron deficiency 02/22/2019   • Kidney problem          SURGICALHX  Past Surgical History:   Procedure Laterality Date   • CARDIAC CATHETERIZATION N/A 3/3/2022    Procedure: Left Heart Cath - right radial;  Surgeon: CATHERINE Hall MD;  Location: [facility] INVASIVE LOCATION;  Service: Cardiology;  Laterality: N/A;   • HIATAL HERNIA REPAIR  02/2019   • HYSTERECTOMY     • INCONTINENCE SURGERY     • LAPAROSCOPIC CHOLECYSTECTOMY  02/2019   • OTHER SURGICAL HISTORY      Artificial Joints/Limbs   • OTHER SURGICAL HISTORY      Joint surgery   • REVISION / REMOVAL NEUROSTIMULATOR     • TOTAL KNEE ARTHROPLASTY Left         SOC  Social History     Socioeconomic History   • Marital status:    Tobacco Use   • Smoking status: Never Smoker   • Smokeless tobacco: Never Used   Vaping Use   • Vaping Use: Never used   Substance and Sexual Activity   • Alcohol use: Yes     Comment: Current some day occasionally drinks, has been drinking for 6-10 years   • Drug use: Never   • Sexual activity: Defer         FAMHX  Family History   Problem Relation Age of Onset    • Cancer Sister    • Cancer Daughter    • Arthritis Daughter           ALLERGY  Allergies   Allergen Reactions   • Lortab [Hydrocodone-Acetaminophen] Itching        MEDSCURRENT    Current Outpatient Medications:   •  albuterol sulfate  (90 Base) MCG/ACT inhaler, Take 1-2 Puffs every 4 hours as needed (Patient taking differently: Take 1-2 Puffs every 4 hours as needed), Disp: 8.5 g, Rfl: 1  •  ascorbic acid (VITAMIN C) 250 MG tablet, Take 250 mg by mouth Daily., Disp: , Rfl:   •  cetirizine (zyrTEC) 10 MG tablet, Take 1 tablet by mouth every night at bedtime., Disp: 90 tablet, Rfl: 1  •  Cholecalciferol (Vitamin D3) 50 MCG (2000 UT) capsule, Take 1 capsule by mouth Daily., Disp: 90 capsule, Rfl: 1  •  citalopram (CeleXA) 10 MG tablet, Take 1 tablet by mouth Daily., Disp: 30 tablet, Rfl: 2  •  cyanocobalamin 1000 MCG/ML injection, Inject 1 mL into the appropriate muscle as directed by prescriber Every 30 (Thirty) Days., Disp: 1 mL, Rfl: 5  •  Echinacea 400 MG capsule, Take 400 mg by mouth Daily., Disp: , Rfl:   •  eletriptan (RELPAX) 40 MG tablet, Take 40 mg by mouth 1 (One) Time As Needed for Migraine. may repeat in 2 hours if necessary, Disp: , Rfl:   •  fluticasone (FLONASE) 50 MCG/ACT nasal spray, Instill 2 sprays into the nostril(s) as directed by provider Daily., Disp: 16 g, Rfl: 1  •  folic acid (FOLVITE) 1 MG tablet, Take 1 mg by mouth Daily., Disp: , Rfl:   •  metoprolol succinate XL (TOPROL-XL) 25 MG 24 hr tablet, Take 1 tablet by mouth Daily., Disp: 90 tablet, Rfl: 1  •  montelukast (SINGULAIR) 10 MG tablet, Take 1 tablet by mouth Every Night., Disp: 90 tablet, Rfl: 1  •  Multiple Vitamins-Minerals (b complex-C-E-zinc) tablet, Take 1 tablet by mouth Daily., Disp: , Rfl:   •  sacubitril-valsartan (ENTRESTO) 49-51 MG tablet, Take 1 tablet by mouth 2 (Two) Times a Day., Disp: 180 tablet, Rfl: 3  •  spironolactone (ALDACTONE) 25 MG tablet, Take 1 tablet by mouth Daily., Disp: 90 tablet, Rfl: 3  •   "valACYclovir (VALTREX) 500 MG tablet, Take 1 tablet by mouth 2 (Two) Times a Day. (Patient taking differently: Take 500 mg by mouth 2 (Two) Times a Day As Needed.), Disp: 60 tablet, Rfl: 1  •  terbinafine (LamISIL) 250 MG tablet, Take 1 tablet by mouth Daily., Disp: 90 tablet, Rfl: 0      Review of Systems   Cardiovascular: Negative for chest pain and dyspnea on exertion.   Respiratory: Negative for shortness of breath.         Objective     /82   Pulse 79   Ht 162.6 cm (64\")   Wt 97.1 kg (214 lb)   BMI 36.73 kg/m²       General Appearance:   · well developed  · well nourished  HENT:   · oropharynx moist  · lips not cyanotic  Neck:  · thyroid not enlarged  · supple  Respiratory:  · no respiratory distress  · normal breath sounds  · no rales  Cardiovascular:  · no jugular venous distention  · regular rhythm  · apical impulse normal  · S1 normal, S2 normal  · no S3, no S4   · no murmur  · no rub, no thrill  · carotid pulses normal; no bruit  · pedal pulses normal  · lower extremity edema: none        Result Review :     The following data was reviewed by: Juan Hall MD on 08/19/2022:    BMP    BMP 3/3/22 4/6/22   BUN 7 10   Creatinine 0.55 (A) 0.66   Sodium 140 140   Potassium 3.6 4.5   Chloride 104 104   CO2 24.6 28.3   Calcium 9.4 9.8   (A) Abnormal value                   Procedures               Assessment and Plan        ASSESSMENT:  Encounter Diagnoses   Name Primary?   • Dilated cardiomyopathy (HCC) Yes   • Essential hypertension    • LBBB (left bundle branch block)          PLAN:    1.  Dilated cardiomyopathy, clinically stable, continue guideline directed medical therapy.  Symptomatically she has improved.  Her ejection fraction was 25 to 35% on her previous cardiac diagnostics.  An echo will be repeated at this point to evaluate her left ventricular ejection fraction.  If she continues to have severe left ventricular dysfunction, will discuss CRT-D device implant.  2.  Essential " hypertension, controlled, continue current medical therapy  3.  Left bundle branch block, we will decide on CRT pacing based on her repeat echo and symptomatology    Routine follow-up will be arranged as well          Patient was given instructions and counseling regarding her condition or for health maintenance advice. Please see specific information pulled into the AVS if appropriate.             CATHERINE Hall MD  8/19/2022    11:53 EDT

## 2022-08-29 ENCOUNTER — HOSPITAL ENCOUNTER (OUTPATIENT)
Dept: CARDIOLOGY | Facility: HOSPITAL | Age: 57
Discharge: HOME | End: 2022-08-29
Admitting: INTERNAL MEDICINE

## 2022-08-29 DIAGNOSIS — I42.0 DILATED CARDIOMYOPATHY: ICD-10-CM

## 2022-08-29 PROCEDURE — 93306 TTE W/DOPPLER COMPLETE: CPT | Performed by: INTERNAL MEDICINE

## 2022-08-29 PROCEDURE — 93306 TTE W/DOPPLER COMPLETE: CPT

## 2022-08-30 LAB
ASCENDING AORTA: 3.1 CM
BH CV ECHO MEAS - AO MAX PG: 16 MMHG
BH CV ECHO MEAS - AO MEAN PG: 9 MMHG
BH CV ECHO MEAS - AO ROOT DIAM: 2.5 CM
BH CV ECHO MEAS - AO V2 MAX: 197 CM/SEC
BH CV ECHO MEAS - AO V2 VTI: 44 CM
BH CV ECHO MEAS - AVA(I,D): 1.2 CM2
BH CV ECHO MEAS - EDV(MOD-SP2): 148 ML
BH CV ECHO MEAS - EDV(MOD-SP4): 146 ML
BH CV ECHO MEAS - EF(MOD-BP): 37 %
BH CV ECHO MEAS - ESV(MOD-SP2): 85 ML
BH CV ECHO MEAS - ESV(MOD-SP4): 99 ML
BH CV ECHO MEAS - IVSD: 1.2 CM
BH CV ECHO MEAS - LA DIMENSION: 4 CM
BH CV ECHO MEAS - LAT PEAK E' VEL: 6.5 CM/SEC
BH CV ECHO MEAS - LV MAX PG: 3 MMHG
BH CV ECHO MEAS - LV MEAN PG: 2 MMHG
BH CV ECHO MEAS - LV V1 MAX: 82 CM/SEC
BH CV ECHO MEAS - LV V1 VTI: 17 CM
BH CV ECHO MEAS - LVIDD: 6.2 CM
BH CV ECHO MEAS - LVIDS: 5.3 CM
BH CV ECHO MEAS - LVOT DIAM: 2 CM
BH CV ECHO MEAS - LVPWD: 1.1 CM
BH CV ECHO MEAS - MED PEAK E' VEL: 6.31 CM/SEC
BH CV ECHO MEAS - MV A MAX VEL: 82 CM/SEC
BH CV ECHO MEAS - MV DEC TIME: 192 MSEC
BH CV ECHO MEAS - MV E MAX VEL: 93 CM/SEC
BH CV ECHO MEAS - MV E/A: 1.1
BH CV ECHO MEAS - RVDD: 2.3 CM
BH CV ECHO MEASUREMENTS AVERAGE E/E' RATIO: 14.52
IVRT: 95 MSEC
LEFT ATRIUM VOLUME INDEX: 36.7 ML/M2
MAXIMAL PREDICTED HEART RATE: 163 BPM
STRESS TARGET HR: 139 BPM

## 2022-09-02 RX ORDER — CYANOCOBALAMIN 1000 UG/ML
1000 INJECTION, SOLUTION INTRAMUSCULAR; SUBCUTANEOUS
Qty: 1 ML | Refills: 5 | Status: CANCELLED | OUTPATIENT
Start: 2022-09-02

## 2022-09-08 ENCOUNTER — HOSPITAL ENCOUNTER (OUTPATIENT)
Dept: ULTRASOUND IMAGING | Facility: HOSPITAL | Age: 57
Discharge: HOME | End: 2022-09-08

## 2022-09-08 ENCOUNTER — HOSPITAL ENCOUNTER (OUTPATIENT)
Dept: CT IMAGING | Facility: HOSPITAL | Age: 57
Discharge: HOME | End: 2022-09-08

## 2022-09-08 DIAGNOSIS — J34.2 DEVIATED SEPTUM: ICD-10-CM

## 2022-09-08 DIAGNOSIS — E04.1 THYROID NODULE: ICD-10-CM

## 2022-09-08 DIAGNOSIS — J32.4 CHRONIC PANSINUSITIS: ICD-10-CM

## 2022-09-08 PROCEDURE — 70486 CT MAXILLOFACIAL W/O DYE: CPT

## 2022-09-08 PROCEDURE — 76536 US EXAM OF HEAD AND NECK: CPT

## 2022-09-23 RX ORDER — CYANOCOBALAMIN 1000 UG/ML
1000 INJECTION, SOLUTION INTRAMUSCULAR; SUBCUTANEOUS
Qty: 1 ML | Refills: 5 | Status: SHIPPED | OUTPATIENT
Start: 2022-09-23 | End: 2022-11-08 | Stop reason: SDUPTHER

## 2022-09-29 ENCOUNTER — TRANSCRIBE ORDERS (OUTPATIENT)
Dept: ADMINISTRATIVE | Facility: HOSPITAL | Age: 57
End: 2022-09-29

## 2022-09-29 DIAGNOSIS — E04.1 THYROID NODULE: Primary | ICD-10-CM

## 2022-10-04 ENCOUNTER — TELEPHONE (OUTPATIENT)
Dept: FAMILY MEDICINE CLINIC | Facility: CLINIC | Age: 57
End: 2022-10-04

## 2022-11-01 ENCOUNTER — APPOINTMENT (OUTPATIENT)
Dept: GENERAL RADIOLOGY | Facility: HOSPITAL | Age: 57
End: 2022-11-01

## 2022-11-01 ENCOUNTER — HOSPITAL ENCOUNTER (OUTPATIENT)
Facility: HOSPITAL | Age: 57
Discharge: HOME | End: 2022-11-03
Attending: STUDENT IN AN ORGANIZED HEALTH CARE EDUCATION/TRAINING PROGRAM | Admitting: INTERNAL MEDICINE

## 2022-11-01 DIAGNOSIS — R00.0 TACHYCARDIA: Primary | ICD-10-CM

## 2022-11-01 DIAGNOSIS — I47.1 ATRIAL TACHYCARDIA: ICD-10-CM

## 2022-11-01 LAB
ALBUMIN SERPL-MCNC: 4.4 G/DL (ref 3.5–5.2)
ALBUMIN/GLOB SERPL: 1.5 G/DL
ALP SERPL-CCNC: 152 U/L (ref 39–117)
ALT SERPL W P-5'-P-CCNC: 36 U/L (ref 1–33)
ANION GAP SERPL CALCULATED.3IONS-SCNC: 14.6 MMOL/L (ref 5–15)
APTT PPP: 27.6 SECONDS (ref 78–95.9)
AST SERPL-CCNC: 53 U/L (ref 1–32)
BILIRUB SERPL-MCNC: 0.2 MG/DL (ref 0–1.2)
BUN SERPL-MCNC: 12 MG/DL (ref 6–20)
BUN/CREAT SERPL: 13.6 (ref 7–25)
CALCIUM SPEC-SCNC: 9.2 MG/DL (ref 8.6–10.5)
CHLORIDE SERPL-SCNC: 99 MMOL/L (ref 98–107)
CO2 SERPL-SCNC: 21.4 MMOL/L (ref 22–29)
CREAT SERPL-MCNC: 0.88 MG/DL (ref 0.57–1)
D DIMER PPP FEU-MCNC: 0.48 MCGFEU/ML (ref 0–0.57)
DEPRECATED RDW RBC AUTO: 39 FL (ref 37–54)
EGFRCR SERPLBLD CKD-EPI 2021: 76.8 ML/MIN/1.73
ERYTHROCYTE [DISTWIDTH] IN BLOOD BY AUTOMATED COUNT: 12.6 % (ref 12.3–15.4)
GLOBULIN UR ELPH-MCNC: 3 GM/DL
GLUCOSE SERPL-MCNC: 225 MG/DL (ref 65–99)
HCT VFR BLD AUTO: 35.1 % (ref 34–46.6)
HGB BLD-MCNC: 12.1 G/DL (ref 12–15.9)
HOLD SPECIMEN: NORMAL
HOLD SPECIMEN: NORMAL
INR PPP: 0.92 (ref 0.86–1.15)
LYMPHOCYTES # BLD MANUAL: 1.24 10*3/MM3 (ref 0.7–3.1)
MAGNESIUM SERPL-MCNC: 2.5 MG/DL (ref 1.6–2.6)
MCH RBC QN AUTO: 29.5 PG (ref 26.6–33)
MCHC RBC AUTO-ENTMCNC: 34.5 G/DL (ref 31.5–35.7)
MCV RBC AUTO: 85.6 FL (ref 79–97)
METAMYELOCYTES NFR BLD MANUAL: 1 % (ref 0–0)
NEUTROPHILS # BLD AUTO: 11.03 10*3/MM3 (ref 1.7–7)
NEUTROPHILS NFR BLD MANUAL: 78 % (ref 42.7–76)
NEUTS BAND NFR BLD MANUAL: 11 % (ref 0–5)
NT-PROBNP SERPL-MCNC: 400 PG/ML (ref 0–900)
PLATELET # BLD AUTO: 304 10*3/MM3 (ref 140–450)
PMV BLD AUTO: 9.7 FL (ref 6–12)
POTASSIUM SERPL-SCNC: 3.3 MMOL/L (ref 3.5–5.2)
PROT SERPL-MCNC: 7.4 G/DL (ref 6–8.5)
PROTHROMBIN TIME: 12.5 SECONDS (ref 11.8–14.9)
RBC # BLD AUTO: 4.1 10*6/MM3 (ref 3.77–5.28)
RBC MORPH BLD: NORMAL
SCAN SLIDE: NORMAL
SMALL PLATELETS BLD QL SMEAR: ADEQUATE
SODIUM SERPL-SCNC: 135 MMOL/L (ref 136–145)
T4 FREE SERPL-MCNC: 1.3 NG/DL (ref 0.93–1.7)
TROPONIN T SERPL-MCNC: <0.01 NG/ML (ref 0–0.03)
TSH SERPL DL<=0.05 MIU/L-ACNC: 1.93 UIU/ML (ref 0.27–4.2)
VARIANT LYMPHS NFR BLD MANUAL: 10 % (ref 19.6–45.3)
WBC MORPH BLD: NORMAL
WBC NRBC COR # BLD: 12.39 10*3/MM3 (ref 3.4–10.8)
WHOLE BLOOD HOLD COAG: NORMAL
WHOLE BLOOD HOLD SPECIMEN: NORMAL

## 2022-11-01 PROCEDURE — 99223 1ST HOSP IP/OBS HIGH 75: CPT | Performed by: INTERNAL MEDICINE

## 2022-11-01 PROCEDURE — 93005 ELECTROCARDIOGRAM TRACING: CPT | Performed by: STUDENT IN AN ORGANIZED HEALTH CARE EDUCATION/TRAINING PROGRAM

## 2022-11-01 PROCEDURE — 85379 FIBRIN DEGRADATION QUANT: CPT | Performed by: INTERNAL MEDICINE

## 2022-11-01 PROCEDURE — 85730 THROMBOPLASTIN TIME PARTIAL: CPT | Performed by: STUDENT IN AN ORGANIZED HEALTH CARE EDUCATION/TRAINING PROGRAM

## 2022-11-01 PROCEDURE — 0 POTASSIUM CHLORIDE 10 MEQ/100ML SOLUTION: Performed by: INTERNAL MEDICINE

## 2022-11-01 PROCEDURE — 36415 COLL VENOUS BLD VENIPUNCTURE: CPT | Performed by: STUDENT IN AN ORGANIZED HEALTH CARE EDUCATION/TRAINING PROGRAM

## 2022-11-01 PROCEDURE — 71045 X-RAY EXAM CHEST 1 VIEW: CPT

## 2022-11-01 PROCEDURE — 83880 ASSAY OF NATRIURETIC PEPTIDE: CPT | Performed by: STUDENT IN AN ORGANIZED HEALTH CARE EDUCATION/TRAINING PROGRAM

## 2022-11-01 PROCEDURE — 83735 ASSAY OF MAGNESIUM: CPT | Performed by: INTERNAL MEDICINE

## 2022-11-01 PROCEDURE — 25010000002 AMIODARONE IN DEXTROSE 5% 150-4.21 MG/100ML-% SOLUTION

## 2022-11-01 PROCEDURE — 85007 BL SMEAR W/DIFF WBC COUNT: CPT | Performed by: STUDENT IN AN ORGANIZED HEALTH CARE EDUCATION/TRAINING PROGRAM

## 2022-11-01 PROCEDURE — 25010000002 ENOXAPARIN PER 10 MG: Performed by: INTERNAL MEDICINE

## 2022-11-01 PROCEDURE — 84484 ASSAY OF TROPONIN QUANT: CPT | Performed by: STUDENT IN AN ORGANIZED HEALTH CARE EDUCATION/TRAINING PROGRAM

## 2022-11-01 PROCEDURE — 96372 THER/PROPH/DIAG INJ SC/IM: CPT

## 2022-11-01 PROCEDURE — 96365 THER/PROPH/DIAG IV INF INIT: CPT

## 2022-11-01 PROCEDURE — 25010000002 MAGNESIUM SULFATE 2 GM/50ML SOLUTION

## 2022-11-01 PROCEDURE — 25010000002 AMIODARONE IN DEXTROSE 5% 360-4.14 MG/200ML-% SOLUTION

## 2022-11-01 PROCEDURE — 80050 GENERAL HEALTH PANEL: CPT | Performed by: STUDENT IN AN ORGANIZED HEALTH CARE EDUCATION/TRAINING PROGRAM

## 2022-11-01 PROCEDURE — 83036 HEMOGLOBIN GLYCOSYLATED A1C: CPT | Performed by: INTERNAL MEDICINE

## 2022-11-01 PROCEDURE — 25010000002 DIPHENHYDRAMINE PER 50 MG: Performed by: STUDENT IN AN ORGANIZED HEALTH CARE EDUCATION/TRAINING PROGRAM

## 2022-11-01 PROCEDURE — 93010 ELECTROCARDIOGRAM REPORT: CPT | Performed by: SPECIALIST

## 2022-11-01 PROCEDURE — 85610 PROTHROMBIN TIME: CPT | Performed by: STUDENT IN AN ORGANIZED HEALTH CARE EDUCATION/TRAINING PROGRAM

## 2022-11-01 PROCEDURE — 96375 TX/PRO/DX INJ NEW DRUG ADDON: CPT

## 2022-11-01 PROCEDURE — 96367 TX/PROPH/DG ADDL SEQ IV INF: CPT

## 2022-11-01 PROCEDURE — 36415 COLL VENOUS BLD VENIPUNCTURE: CPT

## 2022-11-01 PROCEDURE — 84439 ASSAY OF FREE THYROXINE: CPT | Performed by: INTERNAL MEDICINE

## 2022-11-01 PROCEDURE — 99284 EMERGENCY DEPT VISIT MOD MDM: CPT

## 2022-11-01 RX ORDER — ACETAMINOPHEN 325 MG/1
975 TABLET ORAL ONCE
Status: COMPLETED | OUTPATIENT
Start: 2022-11-01 | End: 2022-11-01

## 2022-11-01 RX ORDER — NICOTINE POLACRILEX 4 MG
15 LOZENGE BUCCAL
Status: DISCONTINUED | OUTPATIENT
Start: 2022-11-01 | End: 2022-11-02

## 2022-11-01 RX ORDER — ETOMIDATE 2 MG/ML
10 INJECTION INTRAVENOUS ONCE
Status: DISCONTINUED | OUTPATIENT
Start: 2022-11-01 | End: 2022-11-01

## 2022-11-01 RX ORDER — DIPHENHYDRAMINE HYDROCHLORIDE 50 MG/ML
25 INJECTION INTRAMUSCULAR; INTRAVENOUS ONCE
Status: COMPLETED | OUTPATIENT
Start: 2022-11-01 | End: 2022-11-01

## 2022-11-01 RX ORDER — POTASSIUM CHLORIDE 750 MG/1
40 CAPSULE, EXTENDED RELEASE ORAL ONCE
Status: COMPLETED | OUTPATIENT
Start: 2022-11-01 | End: 2022-11-01

## 2022-11-01 RX ORDER — POTASSIUM CHLORIDE 7.45 MG/ML
10 INJECTION INTRAVENOUS
Status: COMPLETED | OUTPATIENT
Start: 2022-11-01 | End: 2022-11-02

## 2022-11-01 RX ORDER — MAGNESIUM SULFATE HEPTAHYDRATE 40 MG/ML
INJECTION, SOLUTION INTRAVENOUS
Status: COMPLETED
Start: 2022-11-01 | End: 2022-11-01

## 2022-11-01 RX ORDER — SODIUM CHLORIDE 0.9 % (FLUSH) 0.9 %
10 SYRINGE (ML) INJECTION AS NEEDED
Status: DISCONTINUED | OUTPATIENT
Start: 2022-11-01 | End: 2022-11-03 | Stop reason: HOSPADM

## 2022-11-01 RX ORDER — ENOXAPARIN SODIUM 100 MG/ML
40 INJECTION SUBCUTANEOUS DAILY
Status: DISCONTINUED | OUTPATIENT
Start: 2022-11-01 | End: 2022-11-03 | Stop reason: HOSPADM

## 2022-11-01 RX ORDER — INSULIN LISPRO 100 [IU]/ML
2-7 INJECTION, SOLUTION INTRAVENOUS; SUBCUTANEOUS
Status: DISCONTINUED | OUTPATIENT
Start: 2022-11-01 | End: 2022-11-02

## 2022-11-01 RX ORDER — DIPHENHYDRAMINE HYDROCHLORIDE 50 MG/ML
INJECTION INTRAMUSCULAR; INTRAVENOUS
Status: DISPENSED
Start: 2022-11-01 | End: 2022-11-02

## 2022-11-01 RX ORDER — SODIUM CHLORIDE 9 MG/ML
40 INJECTION, SOLUTION INTRAVENOUS AS NEEDED
Status: DISCONTINUED | OUTPATIENT
Start: 2022-11-01 | End: 2022-11-03 | Stop reason: HOSPADM

## 2022-11-01 RX ORDER — DEXTROSE MONOHYDRATE 25 G/50ML
25 INJECTION, SOLUTION INTRAVENOUS
Status: DISCONTINUED | OUTPATIENT
Start: 2022-11-01 | End: 2022-11-02

## 2022-11-01 RX ORDER — NITROGLYCERIN 0.4 MG/1
0.4 TABLET SUBLINGUAL
Status: DISCONTINUED | OUTPATIENT
Start: 2022-11-01 | End: 2022-11-03 | Stop reason: HOSPADM

## 2022-11-01 RX ORDER — ETOMIDATE 2 MG/ML
INJECTION INTRAVENOUS
Status: DISCONTINUED
Start: 2022-11-01 | End: 2022-11-01

## 2022-11-01 RX ORDER — SODIUM CHLORIDE 0.9 % (FLUSH) 0.9 %
10 SYRINGE (ML) INJECTION EVERY 12 HOURS SCHEDULED
Status: DISCONTINUED | OUTPATIENT
Start: 2022-11-01 | End: 2022-11-03 | Stop reason: HOSPADM

## 2022-11-01 RX ADMIN — DIPHENHYDRAMINE HYDROCHLORIDE 25 MG: 50 INJECTION, SOLUTION INTRAMUSCULAR; INTRAVENOUS at 19:05

## 2022-11-01 RX ADMIN — POTASSIUM CHLORIDE 10 MEQ: 7.46 INJECTION, SOLUTION INTRAVENOUS at 22:49

## 2022-11-01 RX ADMIN — POTASSIUM CHLORIDE 40 MEQ: 10 CAPSULE, COATED, EXTENDED RELEASE ORAL at 22:47

## 2022-11-01 RX ADMIN — ACETAMINOPHEN 975 MG: 325 TABLET ORAL at 19:49

## 2022-11-01 RX ADMIN — ENOXAPARIN SODIUM 40 MG: 100 INJECTION SUBCUTANEOUS at 22:48

## 2022-11-01 RX ADMIN — AMIODARONE HYDROCHLORIDE: 1.8 INJECTION, SOLUTION INTRAVENOUS at 18:05

## 2022-11-01 RX ADMIN — POTASSIUM CHLORIDE 10 MEQ: 7.46 INJECTION, SOLUTION INTRAVENOUS at 22:48

## 2022-11-01 RX ADMIN — MAGNESIUM SULFATE HEPTAHYDRATE: 40 INJECTION, SOLUTION INTRAVENOUS at 17:50

## 2022-11-02 LAB
ALBUMIN SERPL-MCNC: 3.7 G/DL (ref 3.5–5.2)
ALBUMIN/GLOB SERPL: 1.5 G/DL
ALP SERPL-CCNC: 131 U/L (ref 39–117)
ALT SERPL W P-5'-P-CCNC: 65 U/L (ref 1–33)
AMPHET+METHAMPHET UR QL: NEGATIVE
ANION GAP SERPL CALCULATED.3IONS-SCNC: 9.5 MMOL/L (ref 5–15)
AST SERPL-CCNC: 87 U/L (ref 1–32)
BARBITURATES UR QL SCN: POSITIVE
BASOPHILS # BLD AUTO: 0.04 10*3/MM3 (ref 0–0.2)
BASOPHILS NFR BLD AUTO: 0.8 % (ref 0–1.5)
BENZODIAZ UR QL SCN: NEGATIVE
BILIRUB SERPL-MCNC: <0.2 MG/DL (ref 0–1.2)
BILIRUB UR QL STRIP: NEGATIVE
BUN SERPL-MCNC: 9 MG/DL (ref 6–20)
BUN/CREAT SERPL: 18.4 (ref 7–25)
CA-I BLDA-SCNC: 1.12 MMOL/L (ref 1.13–1.32)
CALCIUM SPEC-SCNC: 8.4 MG/DL (ref 8.6–10.5)
CANNABINOIDS SERPL QL: NEGATIVE
CHLORIDE SERPL-SCNC: 108 MMOL/L (ref 98–107)
CLARITY UR: CLEAR
CO2 SERPL-SCNC: 23.5 MMOL/L (ref 22–29)
COCAINE UR QL: NEGATIVE
COLOR UR: YELLOW
CREAT SERPL-MCNC: 0.49 MG/DL (ref 0.57–1)
DEPRECATED RDW RBC AUTO: 40.5 FL (ref 37–54)
EGFRCR SERPLBLD CKD-EPI 2021: 110.1 ML/MIN/1.73
EOSINOPHIL # BLD AUTO: 0.07 10*3/MM3 (ref 0–0.4)
EOSINOPHIL NFR BLD AUTO: 1.3 % (ref 0.3–6.2)
ERYTHROCYTE [DISTWIDTH] IN BLOOD BY AUTOMATED COUNT: 12.8 % (ref 12.3–15.4)
GLOBULIN UR ELPH-MCNC: 2.5 GM/DL
GLUCOSE SERPL-MCNC: 101 MG/DL (ref 65–99)
GLUCOSE UR STRIP-MCNC: NEGATIVE MG/DL
HBA1C MFR BLD: 5.6 % (ref 4.8–5.6)
HCT VFR BLD AUTO: 34 % (ref 34–46.6)
HGB BLD-MCNC: 11.2 G/DL (ref 12–15.9)
HGB UR QL STRIP.AUTO: NEGATIVE
IMM GRANULOCYTES # BLD AUTO: 0.04 10*3/MM3 (ref 0–0.05)
IMM GRANULOCYTES NFR BLD AUTO: 0.8 % (ref 0–0.5)
KETONES UR QL STRIP: NEGATIVE
LEUKOCYTE ESTERASE UR QL STRIP.AUTO: NEGATIVE
LYMPHOCYTES # BLD AUTO: 2.16 10*3/MM3 (ref 0.7–3.1)
LYMPHOCYTES NFR BLD AUTO: 41.1 % (ref 19.6–45.3)
MCH RBC QN AUTO: 28.6 PG (ref 26.6–33)
MCHC RBC AUTO-ENTMCNC: 32.9 G/DL (ref 31.5–35.7)
MCV RBC AUTO: 87 FL (ref 79–97)
METHADONE UR QL SCN: NEGATIVE
MONOCYTES # BLD AUTO: 0.48 10*3/MM3 (ref 0.1–0.9)
MONOCYTES NFR BLD AUTO: 9.1 % (ref 5–12)
NEUTROPHILS NFR BLD AUTO: 2.46 10*3/MM3 (ref 1.7–7)
NEUTROPHILS NFR BLD AUTO: 46.9 % (ref 42.7–76)
NITRITE UR QL STRIP: NEGATIVE
NRBC BLD AUTO-RTO: 0 /100 WBC (ref 0–0.2)
OPIATES UR QL: NEGATIVE
OXYCODONE UR QL SCN: NEGATIVE
PH UR STRIP.AUTO: 6.5 [PH] (ref 5–8)
PLATELET # BLD AUTO: 295 10*3/MM3 (ref 140–450)
PMV BLD AUTO: 9.8 FL (ref 6–12)
POTASSIUM SERPL-SCNC: 4.3 MMOL/L (ref 3.5–5.2)
PROT SERPL-MCNC: 6.2 G/DL (ref 6–8.5)
PROT UR QL STRIP: NEGATIVE
QT INTERVAL: 270 MS
QT INTERVAL: 373 MS
RBC # BLD AUTO: 3.91 10*6/MM3 (ref 3.77–5.28)
SODIUM SERPL-SCNC: 141 MMOL/L (ref 136–145)
SP GR UR STRIP: 1.01 (ref 1–1.03)
TROPONIN T SERPL-MCNC: <0.01 NG/ML (ref 0–0.03)
UROBILINOGEN UR QL STRIP: NORMAL
WBC NRBC COR # BLD: 5.25 10*3/MM3 (ref 3.4–10.8)

## 2022-11-02 PROCEDURE — 96367 TX/PROPH/DG ADDL SEQ IV INF: CPT

## 2022-11-02 PROCEDURE — 81003 URINALYSIS AUTO W/O SCOPE: CPT | Performed by: INTERNAL MEDICINE

## 2022-11-02 PROCEDURE — 80307 DRUG TEST PRSMV CHEM ANLYZR: CPT | Performed by: INTERNAL MEDICINE

## 2022-11-02 PROCEDURE — 96366 THER/PROPH/DIAG IV INF ADDON: CPT

## 2022-11-02 PROCEDURE — 99232 SBSQ HOSP IP/OBS MODERATE 35: CPT | Performed by: INTERNAL MEDICINE

## 2022-11-02 PROCEDURE — 25010000002 AMIODARONE IN DEXTROSE 5% 360-4.14 MG/200ML-% SOLUTION: Performed by: INTERNAL MEDICINE

## 2022-11-02 PROCEDURE — 84484 ASSAY OF TROPONIN QUANT: CPT | Performed by: INTERNAL MEDICINE

## 2022-11-02 PROCEDURE — 96372 THER/PROPH/DIAG INJ SC/IM: CPT

## 2022-11-02 PROCEDURE — 93005 ELECTROCARDIOGRAM TRACING: CPT | Performed by: INTERNAL MEDICINE

## 2022-11-02 PROCEDURE — 0 POTASSIUM CHLORIDE 10 MEQ/100ML SOLUTION: Performed by: INTERNAL MEDICINE

## 2022-11-02 PROCEDURE — 25010000002 AMIODARONE IN DEXTROSE 5% 360-4.14 MG/200ML-% SOLUTION: Performed by: STUDENT IN AN ORGANIZED HEALTH CARE EDUCATION/TRAINING PROGRAM

## 2022-11-02 PROCEDURE — 80053 COMPREHEN METABOLIC PANEL: CPT | Performed by: INTERNAL MEDICINE

## 2022-11-02 PROCEDURE — 85025 COMPLETE CBC W/AUTO DIFF WBC: CPT | Performed by: INTERNAL MEDICINE

## 2022-11-02 PROCEDURE — 99222 1ST HOSP IP/OBS MODERATE 55: CPT | Performed by: INTERNAL MEDICINE

## 2022-11-02 PROCEDURE — 25010000002 ENOXAPARIN PER 10 MG: Performed by: INTERNAL MEDICINE

## 2022-11-02 PROCEDURE — 93010 ELECTROCARDIOGRAM REPORT: CPT | Performed by: SPECIALIST

## 2022-11-02 PROCEDURE — 82330 ASSAY OF CALCIUM: CPT | Performed by: INTERNAL MEDICINE

## 2022-11-02 RX ORDER — SPIRONOLACTONE 25 MG/1
25 TABLET ORAL DAILY
Status: DISCONTINUED | OUTPATIENT
Start: 2022-11-02 | End: 2022-11-03 | Stop reason: HOSPADM

## 2022-11-02 RX ORDER — METOPROLOL SUCCINATE 25 MG/1
25 TABLET, EXTENDED RELEASE ORAL DAILY
Status: DISCONTINUED | OUTPATIENT
Start: 2022-11-02 | End: 2022-11-03 | Stop reason: HOSPADM

## 2022-11-02 RX ORDER — ACETAMINOPHEN 325 MG/1
650 TABLET ORAL EVERY 4 HOURS PRN
COMMUNITY

## 2022-11-02 RX ORDER — BUTALBITAL, ACETAMINOPHEN AND CAFFEINE 300; 40; 50 MG/1; MG/1; MG/1
2 CAPSULE ORAL ONCE
Status: COMPLETED | OUTPATIENT
Start: 2022-11-02 | End: 2022-11-02

## 2022-11-02 RX ORDER — AMIODARONE HYDROCHLORIDE 200 MG/1
400 TABLET ORAL EVERY 12 HOURS SCHEDULED
Status: DISCONTINUED | OUTPATIENT
Start: 2022-11-02 | End: 2022-11-03 | Stop reason: HOSPADM

## 2022-11-02 RX ORDER — ACETAMINOPHEN 325 MG/1
650 TABLET ORAL EVERY 6 HOURS PRN
Status: DISCONTINUED | OUTPATIENT
Start: 2022-11-02 | End: 2022-11-03 | Stop reason: HOSPADM

## 2022-11-02 RX ADMIN — ENOXAPARIN SODIUM 40 MG: 100 INJECTION SUBCUTANEOUS at 08:24

## 2022-11-02 RX ADMIN — Medication 10 ML: at 08:25

## 2022-11-02 RX ADMIN — METOPROLOL SUCCINATE 25 MG: 25 TABLET, EXTENDED RELEASE ORAL at 08:24

## 2022-11-02 RX ADMIN — SPIRONOLACTONE 25 MG: 25 TABLET ORAL at 08:24

## 2022-11-02 RX ADMIN — SACUBITRIL AND VALSARTAN 1 TABLET: 49; 51 TABLET, FILM COATED ORAL at 08:24

## 2022-11-02 RX ADMIN — BUTALBITAL, ACETAMINOPHEN AND CAFFEINE 2 CAPSULE: 300; 40; 50 CAPSULE ORAL at 00:32

## 2022-11-02 RX ADMIN — POTASSIUM CHLORIDE 10 MEQ: 7.46 INJECTION, SOLUTION INTRAVENOUS at 00:33

## 2022-11-02 RX ADMIN — SACUBITRIL AND VALSARTAN 1 TABLET: 49; 51 TABLET, FILM COATED ORAL at 20:06

## 2022-11-02 RX ADMIN — Medication 10 ML: at 20:06

## 2022-11-02 RX ADMIN — AMIODARONE HYDROCHLORIDE 400 MG: 200 TABLET ORAL at 08:24

## 2022-11-02 RX ADMIN — AMIODARONE HYDROCHLORIDE 0.5 MG/MIN: 1.8 INJECTION, SOLUTION INTRAVENOUS at 00:13

## 2022-11-02 RX ADMIN — ACETAMINOPHEN 650 MG: 325 TABLET ORAL at 18:29

## 2022-11-02 RX ADMIN — AMIODARONE HYDROCHLORIDE 400 MG: 200 TABLET ORAL at 20:06

## 2022-11-03 ENCOUNTER — READMISSION MANAGEMENT (OUTPATIENT)
Dept: CALL CENTER | Facility: HOSPITAL | Age: 57
End: 2022-11-03

## 2022-11-03 VITALS
SYSTOLIC BLOOD PRESSURE: 113 MMHG | TEMPERATURE: 97.5 F | WEIGHT: 218.48 LBS | RESPIRATION RATE: 18 BRPM | OXYGEN SATURATION: 98 % | DIASTOLIC BLOOD PRESSURE: 59 MMHG | HEART RATE: 65 BPM | HEIGHT: 64 IN | BODY MASS INDEX: 37.3 KG/M2

## 2022-11-03 PROBLEM — R00.0 TACHYCARDIA: Status: RESOLVED | Noted: 2022-11-01 | Resolved: 2022-11-03

## 2022-11-03 PROBLEM — I50.22 CHRONIC SYSTOLIC HEART FAILURE (HCC): Status: ACTIVE | Noted: 2022-11-03

## 2022-11-03 LAB — QT INTERVAL: 522 MS

## 2022-11-03 PROCEDURE — 25010000002 ENOXAPARIN PER 10 MG: Performed by: INTERNAL MEDICINE

## 2022-11-03 PROCEDURE — 93010 ELECTROCARDIOGRAM REPORT: CPT | Performed by: INTERNAL MEDICINE

## 2022-11-03 PROCEDURE — 96372 THER/PROPH/DIAG INJ SC/IM: CPT

## 2022-11-03 PROCEDURE — 99232 SBSQ HOSP IP/OBS MODERATE 35: CPT | Performed by: INTERNAL MEDICINE

## 2022-11-03 PROCEDURE — 99239 HOSP IP/OBS DSCHRG MGMT >30: CPT | Performed by: INTERNAL MEDICINE

## 2022-11-03 PROCEDURE — 93005 ELECTROCARDIOGRAM TRACING: CPT | Performed by: INTERNAL MEDICINE

## 2022-11-03 RX ORDER — AMIODARONE HYDROCHLORIDE 200 MG/1
400 TABLET ORAL 2 TIMES DAILY
Qty: 60 TABLET | Refills: 3 | Status: SHIPPED | OUTPATIENT
Start: 2022-11-03 | End: 2022-12-16

## 2022-11-03 RX ADMIN — AMIODARONE HYDROCHLORIDE 400 MG: 200 TABLET ORAL at 08:38

## 2022-11-03 RX ADMIN — Medication 10 ML: at 08:39

## 2022-11-03 RX ADMIN — ENOXAPARIN SODIUM 40 MG: 100 INJECTION SUBCUTANEOUS at 08:39

## 2022-11-03 RX ADMIN — METOPROLOL SUCCINATE 25 MG: 25 TABLET, EXTENDED RELEASE ORAL at 08:38

## 2022-11-03 RX ADMIN — SACUBITRIL AND VALSARTAN 1 TABLET: 49; 51 TABLET, FILM COATED ORAL at 08:38

## 2022-11-03 RX ADMIN — SPIRONOLACTONE 25 MG: 25 TABLET ORAL at 08:38

## 2022-11-04 ENCOUNTER — TRANSITIONAL CARE MANAGEMENT TELEPHONE ENCOUNTER (OUTPATIENT)
Dept: CALL CENTER | Facility: HOSPITAL | Age: 57
End: 2022-11-04

## 2022-11-04 NOTE — OUTREACH NOTE
Call Center TCM Note    Flowsheet Row Responses   [facility] patient discharged from? [facility]   Does the patient have one of the following disease processes/diagnoses(primary or secondary)? Other   TCM attempt successful? No  [verbal ]   Unsuccessful attempts Attempt 1          Nolvia Krishna RN    2022, 08:16 EDT

## 2022-11-04 NOTE — OUTREACH NOTE
Prep Survey    Flowsheet Row Responses   [facility] patient discharged from? [facility]   Is LACE score < 7 ? No   Emergency Room discharge w/ pulse ox? No   Eligibility [facility]   Date of Admission 11/01/22   Date of Discharge 11/03/22   Discharge Disposition Home or Self Care   Discharge diagnosis Tachycardia   Does the patient have one of the following disease processes/diagnoses(primary or secondary)? Other   Does the patient have Home health ordered? No   Is there a DME ordered? No   Prep survey completed? Yes          LORETA SANTAMARIA - Registered Nurse

## 2022-11-04 NOTE — OUTREACH NOTE
Call Center TCM Note    Flowsheet Row Responses   [facility] patient discharged from? [place]   Does the patient have one of the following disease processes/diagnoses(primary or secondary)? Other   TCM attempt successful? Yes   Call start time 1121   Call end time 1122   Discharge diagnosis Tachycardia   Meds reviewed with patient/caregiver? Yes   Is the patient having any side effects they believe may be caused by any medication additions or changes? No   Does the patient have all medications ordered at discharge? Yes   Is the patient taking all medications as directed (includes completed medication regime)? Yes   Comments HOSP DC FU appt 11/8/22 @ 11:15 am.    Does the patient have an appointment with their PCP within 7 days of discharge? Yes   Has home health visited the patient within 72 hours of discharge? N/A   Psychosocial issues? No   Did the patient receive a copy of their discharge instructions? Yes   Nursing interventions Reviewed instructions with patient   What is the patient's perception of their health status since discharge? Improving   Is the patient/caregiver able to teach back signs and symptoms related to disease process for when to call PCP? Yes   Is the patient/caregiver able to teach back signs and symptoms related to disease process for when to call 911? Yes   Is the patient/caregiver able to teach back the hierarchy of who to call/visit for symptoms/problems? PCP, Specialist, Home health nurse, Urgent Care, ED, 911 Yes   TCM call completed? Yes   Wrap up additional comments Pt reports sh eis doing well.    Call end time 1122          Nolvia Krishna RN    11/4/2022, 11:22 EDT

## 2022-11-08 ENCOUNTER — OFFICE VISIT (OUTPATIENT)
Dept: FAMILY MEDICINE CLINIC | Facility: CLINIC | Age: 57
End: 2022-11-08

## 2022-11-08 VITALS
BODY MASS INDEX: 36.54 KG/M2 | DIASTOLIC BLOOD PRESSURE: 64 MMHG | WEIGHT: 214 LBS | HEIGHT: 64 IN | HEART RATE: 56 BPM | SYSTOLIC BLOOD PRESSURE: 120 MMHG | OXYGEN SATURATION: 99 % | TEMPERATURE: 98.2 F

## 2022-11-08 DIAGNOSIS — Z12.31 SCREENING MAMMOGRAM FOR BREAST CANCER: ICD-10-CM

## 2022-11-08 DIAGNOSIS — I42.0 CARDIOMYOPATHY, DILATED: ICD-10-CM

## 2022-11-08 DIAGNOSIS — E53.8 FOLIC ACID DEFICIENCY: ICD-10-CM

## 2022-11-08 DIAGNOSIS — R19.7 DIARRHEA, UNSPECIFIED TYPE: ICD-10-CM

## 2022-11-08 DIAGNOSIS — E78.2 MIXED HYPERLIPIDEMIA: ICD-10-CM

## 2022-11-08 DIAGNOSIS — E53.8 B12 DEFICIENCY: ICD-10-CM

## 2022-11-08 DIAGNOSIS — I10 PRIMARY HYPERTENSION: ICD-10-CM

## 2022-11-08 DIAGNOSIS — Z23 NEED FOR COVID-19 VACCINE: Primary | ICD-10-CM

## 2022-11-08 DIAGNOSIS — E04.1 THYROID NODULE: ICD-10-CM

## 2022-11-08 DIAGNOSIS — I50.22 CHRONIC SYSTOLIC HEART FAILURE: ICD-10-CM

## 2022-11-08 DIAGNOSIS — R00.0 TACHYCARDIA: ICD-10-CM

## 2022-11-08 DIAGNOSIS — R94.39 ABNORMAL NUCLEAR STRESS TEST: ICD-10-CM

## 2022-11-08 LAB
ALBUMIN SERPL-MCNC: 4.4 G/DL (ref 3.5–5.2)
ALBUMIN/GLOB SERPL: 1.6 G/DL
ALP SERPL-CCNC: 110 U/L (ref 39–117)
ALT SERPL W P-5'-P-CCNC: 23 U/L (ref 1–33)
ANION GAP SERPL CALCULATED.3IONS-SCNC: 7.8 MMOL/L (ref 5–15)
AST SERPL-CCNC: 19 U/L (ref 1–32)
BILIRUB SERPL-MCNC: 0.3 MG/DL (ref 0–1.2)
BUN SERPL-MCNC: 7 MG/DL (ref 6–20)
BUN/CREAT SERPL: 8.8 (ref 7–25)
CALCIUM SPEC-SCNC: 9.6 MG/DL (ref 8.6–10.5)
CHLORIDE SERPL-SCNC: 105 MMOL/L (ref 98–107)
CHOLEST SERPL-MCNC: 200 MG/DL (ref 0–200)
CO2 SERPL-SCNC: 28.2 MMOL/L (ref 22–29)
CREAT SERPL-MCNC: 0.8 MG/DL (ref 0.57–1)
EGFRCR SERPLBLD CKD-EPI 2021: 86.1 ML/MIN/1.73
GLOBULIN UR ELPH-MCNC: 2.8 GM/DL
GLUCOSE SERPL-MCNC: 77 MG/DL (ref 65–99)
HDLC SERPL-MCNC: 67 MG/DL (ref 40–60)
LDLC SERPL CALC-MCNC: 116 MG/DL (ref 0–100)
LDLC/HDLC SERPL: 1.7 {RATIO}
POTASSIUM SERPL-SCNC: 4.4 MMOL/L (ref 3.5–5.2)
PROT SERPL-MCNC: 7.2 G/DL (ref 6–8.5)
SODIUM SERPL-SCNC: 141 MMOL/L (ref 136–145)
T4 FREE SERPL-MCNC: 1.47 NG/DL (ref 0.93–1.7)
TRIGL SERPL-MCNC: 96 MG/DL (ref 0–150)
TSH SERPL DL<=0.05 MIU/L-ACNC: 4.36 UIU/ML (ref 0.27–4.2)
VLDLC SERPL-MCNC: 17 MG/DL (ref 5–40)

## 2022-11-08 PROCEDURE — 80061 LIPID PANEL: CPT | Performed by: NURSE PRACTITIONER

## 2022-11-08 PROCEDURE — 91312 COVID-19 (PFIZER) BIVALENT BOOSTER 12+YRS: CPT | Performed by: NURSE PRACTITIONER

## 2022-11-08 PROCEDURE — 82607 VITAMIN B-12: CPT | Performed by: NURSE PRACTITIONER

## 2022-11-08 PROCEDURE — 84439 ASSAY OF FREE THYROXINE: CPT | Performed by: NURSE PRACTITIONER

## 2022-11-08 PROCEDURE — 0124A PR ADM SARSCOV2 30MCG/0.3ML BST: CPT | Performed by: NURSE PRACTITIONER

## 2022-11-08 PROCEDURE — 80053 COMPREHEN METABOLIC PANEL: CPT | Performed by: NURSE PRACTITIONER

## 2022-11-08 PROCEDURE — 99495 TRANSJ CARE MGMT MOD F2F 14D: CPT | Performed by: NURSE PRACTITIONER

## 2022-11-08 PROCEDURE — 96372 THER/PROPH/DIAG INJ SC/IM: CPT | Performed by: NURSE PRACTITIONER

## 2022-11-08 PROCEDURE — 86376 MICROSOMAL ANTIBODY EACH: CPT | Performed by: NURSE PRACTITIONER

## 2022-11-08 PROCEDURE — 84443 ASSAY THYROID STIM HORMONE: CPT | Performed by: NURSE PRACTITIONER

## 2022-11-08 RX ORDER — CYANOCOBALAMIN 1000 UG/ML
1000 INJECTION, SOLUTION INTRAMUSCULAR; SUBCUTANEOUS
Qty: 1 ML | Refills: 5 | Status: SHIPPED | OUTPATIENT
Start: 2022-11-08

## 2022-11-08 RX ORDER — CYANOCOBALAMIN 1000 UG/ML
1000 INJECTION, SOLUTION INTRAMUSCULAR; SUBCUTANEOUS
Status: DISCONTINUED | OUTPATIENT
Start: 2022-11-08 | End: 2022-12-16 | Stop reason: SDUPTHER

## 2022-11-08 RX ORDER — MONTELUKAST SODIUM 4 MG/1
1 TABLET, CHEWABLE ORAL 2 TIMES DAILY
Qty: 180 TABLET | Refills: 1 | Status: SHIPPED | OUTPATIENT
Start: 2022-11-08

## 2022-11-08 RX ADMIN — CYANOCOBALAMIN 1000 MCG: 1000 INJECTION, SOLUTION INTRAMUSCULAR; SUBCUTANEOUS at 12:18

## 2022-11-08 NOTE — PROGRESS NOTES
Transitional Care Follow Up Visit     Patient Name: Pam Cade  : 1965   MRN: [id number]     Chief Complaint:    Chief Complaint   Patient presents with   • Hospital Follow Up Visit       History of Present Illness: Pam Cade is a 57 y.o. female who presents today for transitional care management visit.  The patient was contacted by our office within 48 hours after the discharge of the patient via telephone to coordinate their follow up care and needs.     Patient presented to the ER 22 for rapid heart rate, dizziness, near syncope. 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 Her coworkers took her vitals and she was tachycardic so they sent her down to the ER.    Patient was admitted to be monitored over night.   Pt had a chest xray that was normal, an echo that showed abnormal mitral valve structure, lt ventricular wall thickness, lt ventricular diastolic dysfunction and an EKG.  Patient was advised to follow up with PCP and cardiology (22)(22)  Patient saw Dr Hall  and they are doing a holter monitor and she was prescribed Amiodarone      Patient is still having some chest pain, no dizziness    Pt c/o diarrhea every time she eats since having gallbladder removed 2019  GI dr lv mcfadden       Subjective      Review of Systems:   Review of Systems   Constitutional: Negative for fever.   Respiratory: Negative for cough.    Cardiovascular: Negative for chest pain.   Gastrointestinal: Positive for diarrhea. Negative for nausea and vomiting.   Genitourinary: Negative for dysuria.   Musculoskeletal: Negative for myalgias.   Neurological: Negative for dizziness and headache.       I reviewed and discussed the details of that call along with the discharge summary, hospital problems, inpatient lab results, inpatient diagnostic studies, and consultation reports with Pam Cade.     Current outpatient and discharge medications have  been reconciled for the patient.  Date of TCM Phone Call 11/3/2022   [facility]   Date of Admission 11/1/2022   Date of Discharge 11/3/2022   Discharge Disposition Home or Self Care       Medications:     Current Outpatient Medications:   •  acetaminophen (TYLENOL) 325 MG tablet, Take 2 tablets by mouth Every 4 (Four) Hours As Needed for Mild Pain. Pt states not allergic to tylenol, Disp: , Rfl:   •  albuterol sulfate  (90 Base) MCG/ACT inhaler, Take 1-2 Puffs every 4 hours as needed, Disp: 8.5 g, Rfl: 1  •  amiodarone (PACERONE) 200 MG tablet, Take 400 mg (2 tablets) twice a day until 11/9/22. Then take 200 mg (1 tablet) twice a day until 11/19/22. Then continue 200 mg (1 tablet) once a day after that, Disp: 60 tablet, Rfl: 3  •  ascorbic acid (VITAMIN C) 250 MG tablet, Take 250 mg by mouth Daily., Disp: , Rfl:   •  cetirizine (zyrTEC) 10 MG tablet, Take 1 tablet by mouth every night at bedtime. (Patient taking differently: Take 1 tablet by mouth Daily.), Disp: 90 tablet, Rfl: 1  •  Cholecalciferol (Vitamin D3) 50 MCG (2000 UT) capsule, Take 1 capsule by mouth Daily., Disp: 90 capsule, Rfl: 1  •  cyanocobalamin 1000 MCG/ML injection, Inject 1 mL into the appropriate muscle as directed by prescriber Every 30 (Thirty) Days., Disp: 1 mL, Rfl: 5  •  Echinacea 400 MG capsule, Take 400 mg by mouth Daily., Disp: , Rfl:   •  eletriptan (RELPAX) 40 MG tablet, Take 40 mg by mouth 1 (One) Time As Needed for Migraine. may repeat in 2 hours if necessary, Disp: , Rfl:   •  fluticasone (FLONASE) 50 MCG/ACT nasal spray, Instill 2 sprays into the nostril(s) as directed by provider Daily., Disp: 16 g, Rfl: 1  •  folic acid (FOLVITE) 1 MG tablet, Take 1 mg by mouth Daily., Disp: , Rfl:   •  metoprolol succinate XL (TOPROL-XL) 25 MG 24 hr tablet, Take 1 tablet by mouth Daily., Disp: 90 tablet, Rfl: 1  •  montelukast (SINGULAIR) 10 MG tablet, Take 1 tablet by mouth Every Night., Disp: 90 tablet, Rfl: 1  •  Multiple  Vitamins-Minerals (b complex-C-E-zinc) tablet, Take 1 tablet by mouth Daily., Disp: , Rfl:   •  sacubitril-valsartan (ENTRESTO) 49-51 MG tablet, Take 1 tablet by mouth 2 (Two) Times a Day., Disp: 180 tablet, Rfl: 3  •  spironolactone (ALDACTONE) 25 MG tablet, Take 1 tablet by mouth Daily., Disp: 90 tablet, Rfl: 3  •  valACYclovir (VALTREX) 500 MG tablet, Take 1 tablet by mouth 2 (Two) Times a Day. (Patient taking differently: Take 1 tablet by mouth 2 (Two) Times a Day As Needed.), Disp: 60 tablet, Rfl: 1  •  colestipol (COLESTID) 1 g tablet, Take 1 tablet by mouth 2 (Two) Times a Day., Disp: 180 tablet, Rfl: 1    Current Facility-Administered Medications:   •  cyanocobalamin injection 1,000 mcg, 1,000 mcg, Intramuscular, Q28 Days, Ally Barnard, APRN, 1,000 mcg at 11/08/22 1218    Allergies:   Allergies   Allergen Reactions   • Lortab [Hydrocodone-Acetaminophen] Itching     Pt states she takes tylenol at home       Hospital Course Information:  Risk for Readmission (LACE) Score: 7 (11/3/2022  6:00 AM)    Course During Hospital Stay:  HLD, HTN, and dilated cardiomyopathy (EF 36-40), and recent history of elevated heart rate presents with rapid heart rate in the setting of dizziness/near syncope.  The patient works here at the hospital and was on her shift when she became very dizzy and felt that she needed to sit down.  Staff took her vitals and found her to be profoundly tachycardic and sent her to the emergency room.  Patient monitored overnight after converting to normal sinus rhythm on amiodarone gtt.  She was transition to p.o. per cardiology and monitored a second night with no acute events.  She has been cleared for discharge from cardiology standpoint with outpatient follow-up and will have a 30-day event monitor arranged through their office.    PMH, PSH, Care Team, Medications including OTC/CAM and Allergies reviewed and updated as appropriate.     Objective     Physical Exam:  Vital Signs:  "  Vitals:    11/08/22 1105   BP: 120/64   Pulse: 56   Temp: 98.2 °F (36.8 °C)   SpO2: 99%   Weight: 97.1 kg (214 lb)   Height: 162.6 cm (64\")     Body mass index is 36.73 kg/m².     Physical Exam  HENT:      Right Ear: Tympanic membrane normal.      Left Ear: Tympanic membrane normal.      Nose: Nose normal.      Mouth/Throat:      Mouth: Mucous membranes are moist.   Eyes:      Conjunctiva/sclera: Conjunctivae normal.   Neck:      Vascular: No carotid bruit.   Cardiovascular:      Rate and Rhythm: Normal rate and regular rhythm.      Heart sounds: Normal heart sounds. No murmur heard.  Pulmonary:      Effort: Pulmonary effort is normal.      Breath sounds: Normal breath sounds.   Abdominal:      General: Bowel sounds are normal.      Palpations: Abdomen is soft.   Musculoskeletal:      Right lower leg: No edema.      Left lower leg: No edema.   Skin:     General: Skin is warm and dry.   Neurological:      Mental Status: She is alert.   Psychiatric:         Mood and Affect: Mood normal.         Behavior: Behavior normal.         Assessment / Plan      Assessment/Plan:   Diagnoses and all orders for this visit:    1. Need for COVID-19 vaccine (Primary)  -     COVID-19 Bivalent Booster (Pfizer) 12+yrs    2. Cardiomyopathy, dilated (HCC)    3. Primary hypertension    4. Abnormal nuclear stress test    5. Tachycardia    6. Mixed hyperlipidemia  -     Lipid Panel  -     Comprehensive Metabolic Panel    7. Chronic systolic heart failure (CMS/HCC)    8. B12 deficiency  -     cyanocobalamin injection 1,000 mcg  -     Vitamin B12    9. Folic acid deficiency  -     Folate; Future    10. Thyroid nodule  -     TSH  -     T4, Free  -     Thyroid Peroxidase Antibody    11. Diarrhea, unspecified type    12. Screening mammogram for breast cancer  -     Mammo Screening Digital Tomosynthesis Bilateral With CAD; Future    Other orders  -     cyanocobalamin 1000 MCG/ML injection; Inject 1 mL into the appropriate muscle as directed by " "prescriber Every 30 (Thirty) Days.  Dispense: 1 mL; Refill: 5  -     colestipol (COLESTID) 1 g tablet; Take 1 tablet by mouth 2 (Two) Times a Day.  Dispense: 180 tablet; Refill: 1       Cardiomyopathy abnormal nuclear stress test tachycardia controlled with amiodarone at this time keep follow-up appointment with cardiologist as scheduled  Patient currently has a event monitor in place  Congestive heart failure currently on Entresto  Hyperlipidemia we will obtain lipid panel and CMP to monitor we will call with results and further recommendations if statin is warranted  B12 deficiency will provide once monthly injection in office today and folic acid deficiency will obtain labs to monitor  Thyroid nodule we will repeat labs as recommended by ear nose and throat  Diarrhea we will try Colestid increase water and fiber in diet if symptoms persist would refer back to patient's gastroenterologist  We will provide order for screening mammogram denies lumps mass tenderness blood and discharge from the nipple    Follow Up:   Return in about 6 months (around 5/8/2023).      Akil Canales, DORCAS    \"Please note that portions of this note were completed with a voice recognition program.\"      "

## 2022-11-09 ENCOUNTER — APPOINTMENT (OUTPATIENT)
Dept: GENERAL RADIOLOGY | Facility: HOSPITAL | Age: 57
End: 2022-11-09

## 2022-11-09 ENCOUNTER — TELEPHONE (OUTPATIENT)
Dept: CARDIOLOGY | Facility: CLINIC | Age: 57
End: 2022-11-09

## 2022-11-09 ENCOUNTER — HOSPITAL ENCOUNTER (EMERGENCY)
Facility: HOSPITAL | Age: 57
Discharge: HOME | End: 2022-11-09
Attending: EMERGENCY MEDICINE | Admitting: EMERGENCY MEDICINE

## 2022-11-09 VITALS
TEMPERATURE: 97.2 F | WEIGHT: 200 LBS | SYSTOLIC BLOOD PRESSURE: 113 MMHG | BODY MASS INDEX: 35.44 KG/M2 | OXYGEN SATURATION: 95 % | RESPIRATION RATE: 18 BRPM | DIASTOLIC BLOOD PRESSURE: 70 MMHG | HEIGHT: 63 IN | HEART RATE: 67 BPM

## 2022-11-09 DIAGNOSIS — E53.8 FOLIC ACID DEFICIENCY: ICD-10-CM

## 2022-11-09 DIAGNOSIS — R07.9 NONSPECIFIC CHEST PAIN: Primary | ICD-10-CM

## 2022-11-09 DIAGNOSIS — E03.9 ACQUIRED HYPOTHYROIDISM: Primary | ICD-10-CM

## 2022-11-09 LAB
ALBUMIN SERPL-MCNC: 4.4 G/DL (ref 3.5–5.2)
ALBUMIN/GLOB SERPL: 1.6 G/DL
ALP SERPL-CCNC: 117 U/L (ref 39–117)
ALT SERPL W P-5'-P-CCNC: 21 U/L (ref 1–33)
ANION GAP SERPL CALCULATED.3IONS-SCNC: 11.2 MMOL/L (ref 5–15)
AST SERPL-CCNC: 17 U/L (ref 1–32)
BASOPHILS # BLD AUTO: 0.07 10*3/MM3 (ref 0–0.2)
BASOPHILS NFR BLD AUTO: 1 % (ref 0–1.5)
BILIRUB SERPL-MCNC: 0.2 MG/DL (ref 0–1.2)
BUN SERPL-MCNC: 13 MG/DL (ref 6–20)
BUN/CREAT SERPL: 16.7 (ref 7–25)
CALCIUM SPEC-SCNC: 9.9 MG/DL (ref 8.6–10.5)
CHLORIDE SERPL-SCNC: 104 MMOL/L (ref 98–107)
CO2 SERPL-SCNC: 23.8 MMOL/L (ref 22–29)
CREAT SERPL-MCNC: 0.78 MG/DL (ref 0.57–1)
DEPRECATED RDW RBC AUTO: 40.1 FL (ref 37–54)
EGFRCR SERPLBLD CKD-EPI 2021: 88.7 ML/MIN/1.73
EOSINOPHIL # BLD AUTO: 0.11 10*3/MM3 (ref 0–0.4)
EOSINOPHIL NFR BLD AUTO: 1.6 % (ref 0.3–6.2)
ERYTHROCYTE [DISTWIDTH] IN BLOOD BY AUTOMATED COUNT: 12.7 % (ref 12.3–15.4)
GLOBULIN UR ELPH-MCNC: 2.7 GM/DL
GLUCOSE SERPL-MCNC: 106 MG/DL (ref 65–99)
HCT VFR BLD AUTO: 37.2 % (ref 34–46.6)
HGB BLD-MCNC: 12.3 G/DL (ref 12–15.9)
HOLD SPECIMEN: NORMAL
IMM GRANULOCYTES # BLD AUTO: 0.02 10*3/MM3 (ref 0–0.05)
IMM GRANULOCYTES NFR BLD AUTO: 0.3 % (ref 0–0.5)
LIPASE SERPL-CCNC: 23 U/L (ref 13–60)
LYMPHOCYTES # BLD AUTO: 2.62 10*3/MM3 (ref 0.7–3.1)
LYMPHOCYTES NFR BLD AUTO: 37.8 % (ref 19.6–45.3)
MAGNESIUM SERPL-MCNC: 2.1 MG/DL (ref 1.6–2.6)
MCH RBC QN AUTO: 28.9 PG (ref 26.6–33)
MCHC RBC AUTO-ENTMCNC: 33.1 G/DL (ref 31.5–35.7)
MCV RBC AUTO: 87.3 FL (ref 79–97)
MONOCYTES # BLD AUTO: 0.7 10*3/MM3 (ref 0.1–0.9)
MONOCYTES NFR BLD AUTO: 10.1 % (ref 5–12)
NEUTROPHILS NFR BLD AUTO: 3.41 10*3/MM3 (ref 1.7–7)
NEUTROPHILS NFR BLD AUTO: 49.2 % (ref 42.7–76)
NRBC BLD AUTO-RTO: 0 /100 WBC (ref 0–0.2)
NT-PROBNP SERPL-MCNC: 174 PG/ML (ref 0–900)
PLATELET # BLD AUTO: 364 10*3/MM3 (ref 140–450)
PMV BLD AUTO: 9.5 FL (ref 6–12)
POTASSIUM SERPL-SCNC: 4.2 MMOL/L (ref 3.5–5.2)
PROT SERPL-MCNC: 7.1 G/DL (ref 6–8.5)
QT INTERVAL: 467 MS
QT INTERVAL: 501 MS
QT INTERVAL: 535 MS
RBC # BLD AUTO: 4.26 10*6/MM3 (ref 3.77–5.28)
SODIUM SERPL-SCNC: 139 MMOL/L (ref 136–145)
THYROPEROXIDASE AB SERPL-ACNC: <8 IU/ML (ref 0–34)
TROPONIN I SERPL-MCNC: 0 NG/ML (ref 0–0.08)
TROPONIN I SERPL-MCNC: 0 NG/ML (ref 0–0.08)
WBC NRBC COR # BLD: 6.93 10*3/MM3 (ref 3.4–10.8)
WHOLE BLOOD HOLD COAG: NORMAL
WHOLE BLOOD HOLD SPECIMEN: NORMAL

## 2022-11-09 PROCEDURE — 99284 EMERGENCY DEPT VISIT MOD MDM: CPT

## 2022-11-09 PROCEDURE — 83690 ASSAY OF LIPASE: CPT

## 2022-11-09 PROCEDURE — 84484 ASSAY OF TROPONIN QUANT: CPT

## 2022-11-09 PROCEDURE — 83880 ASSAY OF NATRIURETIC PEPTIDE: CPT

## 2022-11-09 PROCEDURE — 93005 ELECTROCARDIOGRAM TRACING: CPT | Performed by: EMERGENCY MEDICINE

## 2022-11-09 PROCEDURE — 36415 COLL VENOUS BLD VENIPUNCTURE: CPT

## 2022-11-09 PROCEDURE — 85025 COMPLETE CBC W/AUTO DIFF WBC: CPT

## 2022-11-09 PROCEDURE — 83735 ASSAY OF MAGNESIUM: CPT

## 2022-11-09 PROCEDURE — 82607 VITAMIN B-12: CPT | Performed by: NURSE PRACTITIONER

## 2022-11-09 PROCEDURE — 93005 ELECTROCARDIOGRAM TRACING: CPT

## 2022-11-09 PROCEDURE — 82746 ASSAY OF FOLIC ACID SERUM: CPT | Performed by: NURSE PRACTITIONER

## 2022-11-09 PROCEDURE — 71045 X-RAY EXAM CHEST 1 VIEW: CPT

## 2022-11-09 PROCEDURE — 80053 COMPREHEN METABOLIC PANEL: CPT

## 2022-11-09 RX ORDER — ASPIRIN 81 MG/1
324 TABLET, CHEWABLE ORAL ONCE
Status: COMPLETED | OUTPATIENT
Start: 2022-11-09 | End: 2022-11-09

## 2022-11-09 RX ORDER — SODIUM CHLORIDE 0.9 % (FLUSH) 0.9 %
10 SYRINGE (ML) INJECTION AS NEEDED
Status: DISCONTINUED | OUTPATIENT
Start: 2022-11-09 | End: 2022-11-09 | Stop reason: HOSPADM

## 2022-11-09 RX ADMIN — ASPIRIN 81 MG CHEWABLE TABLET 324 MG: 81 TABLET CHEWABLE at 16:39

## 2022-11-09 NOTE — TELEPHONE ENCOUNTER
Received VM from patient    SW patient. Patient c/o CP and pressure like elephant on chest. Patient also not feeling right. Advised to go to ER.

## 2022-11-09 NOTE — ED PROVIDER NOTES
Time: 6:33 PM EST  Arrived by: ambulance  Chief Complaint: chest pain  History provided by: Pt  History is limited by: N/A     History of Present Illness:  Patient is a 57 y.o.  female that presents to the emergency department with chest pain for the past day. Pt is wearing a holter motinor by her cardiologist's recommendations. No blockages were seen when patient had a cardiac cath earlier this year. Currently, Pt is having chest pressure along with some SOB with exertion. Pt denies any fever or cough. Pt denies any recent long travel, denies any new numbness or tingling in extremities.     HPI    Patient Care Team  Primary Care Provider: Ally Barnard APRN    Past Medical History:     Allergies   Allergen Reactions   • Lortab [Hydrocodone-Acetaminophen] Itching     Pt states she takes tylenol at home     Past Medical History:   Diagnosis Date   • Allergic rhinitis due to allergen 01/08/2021   • Ankle pain    • Arthritis    • B12 deficiency 02/22/2019   • Bladder disorder    • Condition not found     Ulcer   • Cramps of lower extremity    • Folic acid deficiency 02/22/2019   • Foot pain, left 03/20/2018   • High cholesterol    • Hypertension    • Kidney problem      Past Surgical History:   Procedure Laterality Date   • CARDIAC CATHETERIZATION N/A 3/3/2022    Procedure: Left Heart Cath - right radial;  Surgeon: CATHERINE Hall MD;  [facility];  Service: Cardiology;  Laterality: N/A;   • HIATAL HERNIA REPAIR  02/2019   • HYSTERECTOMY     • INCONTINENCE SURGERY     • LAPAROSCOPIC CHOLECYSTECTOMY  02/2019   • OTHER SURGICAL HISTORY      Artificial Joints/Limbs   • OTHER SURGICAL HISTORY      Joint surgery   • REVISION / REMOVAL NEUROSTIMULATOR     • TOTAL KNEE ARTHROPLASTY Left      Family History   Problem Relation Age of Onset   • Cancer Sister    • Cancer Daughter    • Arthritis Daughter        Home Medications:  Prior to Admission medications    Medication Sig Start Date  End Date Taking? Authorizing Provider   acetaminophen (TYLENOL) 325 MG tablet Take 2 tablets by mouth Every 4 (Four) Hours As Needed for Mild Pain. Pt states not allergic to tylenol    Lito Berg MD   albuterol sulfate  (90 Base) MCG/ACT inhaler Take 1-2 Puffs every 4 hours as needed 9/7/21   Ally Barnard APRN   amiodarone (PACERONE) 200 MG tablet Take 400 mg (2 tablets) twice a day until 11/9/22. Then take 200 mg (1 tablet) twice a day until 11/19/22. Then continue 200 mg (1 tablet) once a day after that 11/3/22   CATHERINE Hall MD   ascorbic acid (VITAMIN C) 250 MG tablet Take 250 mg by mouth Daily.    Lito Berg MD   cetirizine (zyrTEC) 10 MG tablet Take 1 tablet by mouth every night at bedtime.  Patient taking differently: Take 1 tablet by mouth Daily. 4/6/22   Ally Barnard APRN   Cholecalciferol (Vitamin D3) 50 MCG (2000 UT) capsule Take 1 capsule by mouth Daily. 7/8/21   Ally Barnard APRN   colestipol (COLESTID) 1 g tablet Take 1 tablet by mouth 2 (Two) Times a Day. 11/8/22   Ally Barnard APRN   cyanocobalamin 1000 MCG/ML injection Inject 1 mL into the appropriate muscle as directed by prescriber Every 30 (Thirty) Days. 11/8/22   Ally Barnard APRN   Echinacea 400 MG capsule Take 400 mg by mouth Daily.    Lito Berg MD   eletriptan (RELPAX) 40 MG tablet Take 40 mg by mouth 1 (One) Time As Needed for Migraine. may repeat in 2 hours if necessary    Lito Berg MD   fluticasone (FLONASE) 50 MCG/ACT nasal spray Instill 2 sprays into the nostril(s) as directed by provider Daily. 7/8/21   Ally Barnard APRN   folic acid (FOLVITE) 1 MG tablet Take 1 mg by mouth Daily.    Lito Berg MD   metoprolol succinate XL (TOPROL-XL) 25 MG 24 hr tablet Take 1 tablet by mouth Daily. 7/8/21   Ally Barnard APRN   montelukast (SINGULAIR) 10 MG tablet Take 1 tablet by mouth Every  "Night. 4/6/22   Ally Barnard APRN   Multiple Vitamins-Minerals (b complex-C-E-zinc) tablet Take 1 tablet by mouth Daily.    Provider, MD Lito   sacubitril-valsartan (ENTRESTO) 49-51 MG tablet Take 1 tablet by mouth 2 (Two) Times a Day. 8/19/22   CATHERINE Hall MD   spironolactone (ALDACTONE) 25 MG tablet Take 1 tablet by mouth Daily. 8/19/22   CATHERINE Hall MD   valACYclovir (VALTREX) 500 MG tablet Take 1 tablet by mouth 2 (Two) Times a Day.  Patient taking differently: Take 1 tablet by mouth 2 (Two) Times a Day As Needed. 1/24/22   Ally Barnard APRN        Social History:   Social History     Tobacco Use   • Smoking status: Never   • Smokeless tobacco: Never   Vaping Use   • Vaping Use: Never used   Substance Use Topics   • Alcohol use: Yes     Comment: Current some day occasionally drinks, has been drinking for 6-10 years   • Drug use: Never       Review of Systems:  Review of Systems   Constitutional: Negative for chills and fever.   HENT: Negative for congestion, rhinorrhea and sore throat.    Eyes: Negative for pain and visual disturbance.   Respiratory: Positive for cough and chest tightness. Negative for apnea and shortness of breath.    Cardiovascular: Negative for chest pain and palpitations.   Gastrointestinal: Negative for abdominal pain, diarrhea, nausea and vomiting.   Genitourinary: Negative for difficulty urinating and dysuria.   Musculoskeletal: Negative for joint swelling and myalgias.   Skin: Negative for color change.   Neurological: Negative for seizures and headaches.   Psychiatric/Behavioral: Negative.    All other systems reviewed and are negative.         Physical Exam:  /70   Pulse 67   Temp 97.2 °F (36.2 °C)   Resp 18   Ht 160 cm (63\")   Wt 90.7 kg (200 lb)   SpO2 95%   BMI 35.43 kg/m²     Physical Exam  Vitals and nursing note reviewed.   Constitutional:       Appearance: Normal appearance.   HENT:      Head: Normocephalic and atraumatic. "      Nose: Nose normal.      Mouth/Throat:      Mouth: Mucous membranes are dry.   Eyes:      Extraocular Movements: Extraocular movements intact.      Pupils: Pupils are equal, round, and reactive to light.   Cardiovascular:      Rate and Rhythm: Normal rate and regular rhythm.      Heart sounds: Normal heart sounds.   Pulmonary:      Effort: Pulmonary effort is normal.      Breath sounds: Normal breath sounds.   Abdominal:      General: Bowel sounds are normal.      Palpations: Abdomen is soft.      Tenderness: There is no abdominal tenderness.   Musculoskeletal:         General: No swelling. Normal range of motion.      Cervical back: Normal range of motion and neck supple.   Skin:     General: Skin is warm and dry.      Coloration: Skin is not jaundiced.   Neurological:      General: No focal deficit present.      Mental Status: She is alert and oriented to person, place, and time. Mental status is at baseline.   Psychiatric:         Mood and Affect: Mood normal.         Behavior: Behavior normal.         Judgment: Judgment normal.                Medications in the Emergency Department:  Medications   aspirin chewable tablet 324 mg (324 mg Oral Given 11/9/22 1639)        Labs  Lab Results (last 24 hours)     Procedure Component Value Units Date/Time    POC Troponin I with Hold Tube [[id number]] Collected: 11/09/22 1634    Specimen: Blood Updated: 11/09/22 1701    Narrative:      The following orders were created for panel order POC Troponin I with Hold Tube.  Procedure                               Abnormality         Status                     ---------                               -----------         ------                     POC Troponin I[[id number]]                                                              HOLD Troponin-I Tube[[id number]]                             Final result                 Please view results for these tests on the individual orders.    CBC & Differential [[id number]]  (Normal)  Collected: 11/09/22 1634    Specimen: Blood Updated: 11/09/22 1657    Narrative:      The following orders were created for panel order CBC & Differential.  Procedure                               Abnormality         Status                     ---------                               -----------         ------                     CBC Auto Differential[[id number]]        Normal              Final result                 Please view results for these tests on the individual orders.    Comprehensive Metabolic Panel [[id number]]  (Abnormal) Collected: 11/09/22 1634    Specimen: Blood Updated: 11/09/22 1726     Glucose 106 mg/dL      BUN 13 mg/dL      Creatinine 0.78 mg/dL      Sodium 139 mmol/L      Potassium 4.2 mmol/L      Chloride 104 mmol/L      CO2 23.8 mmol/L      Calcium 9.9 mg/dL      Total Protein 7.1 g/dL      Albumin 4.40 g/dL      ALT (SGPT) 21 U/L      AST (SGOT) 17 U/L      Alkaline Phosphatase 117 U/L      Total Bilirubin 0.2 mg/dL      Globulin 2.7 gm/dL      A/G Ratio 1.6 g/dL      BUN/Creatinine Ratio 16.7     Anion Gap 11.2 mmol/L      eGFR 88.7 mL/min/1.73      Comment: National Kidney Foundation and American Society of Nephrology (ASN) Task Force recommended calculation based on the Chronic Kidney Disease Epidemiology Collaboration (CKD-EPI) equation refit without adjustment for race.       Narrative:      GFR Normal >60  Chronic Kidney Disease <60  Kidney Failure <15      Lipase [[id number]]  (Normal) Collected: 11/09/22 1634    Specimen: Blood Updated: 11/09/22 1722     Lipase 23 U/L     BNP [[id number]]  (Normal) Collected: 11/09/22 1634    Specimen: Blood Updated: 11/09/22 1720     proBNP 174.0 pg/mL     Narrative:      Among patients with dyspnea, NT-proBNP is highly sensitive for the detection of acute congestive heart failure. In addition NT-proBNP of <300 pg/ml effectively rules out acute congestive heart failure with 99% negative predictive value.      Magnesium [[id number]]  (Normal) Collected:  11/09/22 1634    Specimen: Blood Updated: 11/09/22 1722     Magnesium 2.1 mg/dL     CBC Auto Differential [[id number]]  (Normal) Collected: 11/09/22 1634    Specimen: Blood Updated: 11/09/22 1657     WBC 6.93 10*3/mm3      RBC 4.26 10*6/mm3      Hemoglobin 12.3 g/dL      Hematocrit 37.2 %      MCV 87.3 fL      MCH 28.9 pg      MCHC 33.1 g/dL      RDW 12.7 %      RDW-SD 40.1 fl      MPV 9.5 fL      Platelets 364 10*3/mm3      Neutrophil % 49.2 %      Lymphocyte % 37.8 %      Monocyte % 10.1 %      Eosinophil % 1.6 %      Basophil % 1.0 %      Immature Grans % 0.3 %      Neutrophils, Absolute 3.41 10*3/mm3      Lymphocytes, Absolute 2.62 10*3/mm3      Monocytes, Absolute 0.70 10*3/mm3      Eosinophils, Absolute 0.11 10*3/mm3      Basophils, Absolute 0.07 10*3/mm3      Immature Grans, Absolute 0.02 10*3/mm3      nRBC 0.0 /100 WBC     POC Troponin I [[id number]]  (Normal) Collected: 11/09/22 1642    Specimen: Blood Updated: 11/09/22 1655     Troponin I 0.00 ng/mL      Comment: Serial Number: [id number]:  [id number]       POC Troponin I [[id number]]  (Normal) Collected: 11/09/22 1942    Specimen: Blood Updated: 11/09/22 1955     Troponin I 0.00 ng/mL      Comment: Serial Number: [id number]  [id number]              Imaging:  XR Chest 1 View    Result Date: 11/9/2022  PROCEDURE: XR CHEST 1 VW  COMPARISON: [facility], CR, XR CHEST 1 VW, 11/01/2022, 18:05.  INDICATIONS: Chest Pain Triage Protocol/chest pain and pressure starting today  FINDINGS:  The cardiac silhouette is within normal limits.  Pulmonary vascularity appears normal.  There is no focal airspace consolidation, pleural effusion, or pneumothorax.  There are degenerative changes of the thoracic spine.        1. No acute cardiopulmonary abnormality.        TONYA GOMEZ MD       Electronically Signed and Approved By: TONYA GOMEZ MD on 11/09/2022 at 18:15                EKG:      Procedures:  Procedures    Progress  ED Course as of 11/10/22 0301    Wed Nov 09, 2022 1812 EKG interpretation: An normal sinus rhythm, heart rate 72, normal OK and QT intervals, left bundle branch block, nonspecific ST segment changes with no acute ischemia.  Left bundle branch and nonspecific ST segment changes were present on the EKG dated 11/3/2022. [RP]   1945 Case discussed with cardiology on-call, Dr. Cowart.  We reviewed lab findings, chest x-ray, vital signs from today's visit along with the cardiac catheterization that was in March of this year.  Patient is safe for discharge home based on work-up. [RP]      ED Course User Index  [RP] Eric Capone MD           HEART Score: 5                  Medical Decision Making:  MDM  Number of Diagnoses or Management Options  Diagnosis management comments: Indications    Abnormal nuclear stress test (R94.39 (ICD-10-CM))  Cardiomyopathy, dilated (HCC) (I42.0 (ICD-10-CM))    Pre Procedure Diagnosis      CardiomyopathyAbnormal nuclear stress testCardiomyopathy, dilated     Conclusion    Cardiac catheterization procedure note     Procedure:  1.  Left heart catheterization, selective coronary angiography, left ventricular angiography  2.  Right radial artery access     Indication:     Dilated cardiomyopathy, severe LV dysfunction, with left bundle branch block     EBL:     Less than 10 cc, no complications     Description:     Risks and benefits were explained to the patient, informed consent was obtained, the patient was brought to the catheterization lab in a fasting state, prepped and draped in sterile fashion for right radial artery access.  Local anesthesia was administered, conscious sedation was administered.  5F sheath was placed in the R radial artery after Allens test passed.  Antispasmodic cocktail and systemic heparin were administered.     A TIG catheter was advanced over a baby J wire under fluoroscopy.  Left and right coronaries were engaged and angiograms obtained.  An angled pigtail catheter was exchanged for the  TIG over the wire and advanced across the aortic valve left ventricular pressure was measured, left ventricular angiography was performed.  Pullback gradient was measured.  The catheters were removed.     Sheath was removed and a TR band was placed for hemostasis.     There were no complications from the diagnostic portion of the catheterization procedure.           Angiographic findings     Left main-angiographically normal  LAD-large, with one large diagonal branch and multiple septal perforators.  Is angiographically normal.  Left circumflex-moderate size, nondominant, angiographically normal  RCA-large, dominant, angiographically normal     Levophase coronary sinus angiography obtained in BRAYDEN and RESENDIZ view also     Left ventricular angiogram-10 to 12 mmHg, ejection fraction 25 to 30% with global hypokinesis  No aortic valve gradient        Conclusions:     Angiographically normal coronary arteries  Severe left ventricular dysfunction     Plan:     Medical management of dilated cardiomyopathy  Post procedure care than discharge today  Follow-up in the office for ongoing medical therapy titration  Reassess ejection fraction after optimization of medical therapy, if EF remains significantly reduced consider CRT-D implant     Juan Hall MD         Amount and/or Complexity of Data Reviewed  Tests in the radiology section of CPT®: reviewed  Obtain history from someone other than the patient: yes  Review and summarize past medical records: yes  Independent visualization of images, tracings, or specimens: yes    Risk of Complications, Morbidity, and/or Mortality  Presenting problems: moderate  Management options: low         Final diagnoses:   Nonspecific chest pain        Disposition:  ED Disposition     ED Disposition   Discharge    Condition   Stable    Comment   --                Diego Jacome  11/09/22 0082       Diego Jacome  11/09/22 5222       Eric Capone MD  11/10/22 [id number]

## 2022-11-10 LAB
FOLATE SERPL-MCNC: 13 NG/ML (ref 4.78–24.2)
VIT B12 BLD-MCNC: 1598 PG/ML (ref 211–946)

## 2022-11-15 ENCOUNTER — READMISSION MANAGEMENT (OUTPATIENT)
Dept: CALL CENTER | Facility: HOSPITAL | Age: 57
End: 2022-11-15

## 2022-11-15 NOTE — OUTREACH NOTE
Medical Week 2 Survey    Flowsheet Row Responses   [facility] patient discharged from? [facility]   Does the patient have one of the following disease processes/diagnoses(primary or secondary)? Other   Week 2 attempt successful? Yes   Call start time 0807   Call end time 0814   Meds reviewed with patient/caregiver? Yes   Is the patient having any side effects they believe may be caused by any medication additions or changes? No   Does the patient have all medications ordered at discharge? Yes   Is the patient taking all medications as directed (includes completed medication regime)? Yes   Medication comments States her cardiologist has called her in some new medication-unsure of name.   Comments regarding appointments Has seen her PCP. Cardiac monitor visit 11/16/22.    Does the patient have a primary care provider?  Yes   Does the patient have an appointment with their PCP within 7 days of discharge? Yes   Has the patient kept scheduled appointments due by today? Yes   Has home health visited the patient within 72 hours of discharge? N/A   DME comments Wearing a 30 day cardiac monitor.   Psychosocial issues? No   What is the patient's perception of their health status since discharge? Improving  [States slightly improved, but still having episodes of mild chest tightness.]   Is the patient/caregiver able to teach back signs and symptoms related to disease process for when to call PCP? Yes   Is the patient/caregiver able to teach back signs and symptoms related to disease process for when to call 911? Yes   Is the patient/caregiver able to teach back the hierarchy of who to call/visit for symptoms/problems? PCP, Specialist, Home health nurse, Urgent Care, ED, 911 Yes   If the patient is a current smoker, are they able to teach back resources for cessation? Not a smoker   Week 2 Call Completed? Yes   Wrap up additional comments States is slightly improved, but continues to have short episodes of mild chest  tightness. States cardiologist aware, and was evaluated in ER 11/09/22. Continues to wear cardiac monitor, and will be trying new medication prescribed by her cardiologist. Denies any needs/concerns today.          EMI LIM - Registered Nurse

## 2022-11-23 ENCOUNTER — READMISSION MANAGEMENT (OUTPATIENT)
Dept: CALL CENTER | Facility: HOSPITAL | Age: 57
End: 2022-11-23

## 2022-11-23 NOTE — OUTREACH NOTE
Medical Week 3 Survey    Flowsheet Row Responses   [facility] patient discharged from? [facility]   Does the patient have one of the following disease processes/diagnoses(primary or secondary)? Other   Week 3 attempt successful? Yes   Call start time 1528   Call end time 1532   Discharge diagnosis Tachycardia   Meds reviewed with patient/caregiver? Yes   Is the patient having any side effects they believe may be caused by any medication additions or changes? No   Is the patient taking all medications as directed (includes completed medication regime)? Yes   Does the patient have a primary care provider?  Yes   Does the patient have an appointment with their PCP within 7 days of discharge? Yes   Has the patient kept scheduled appointments due by today? Yes   Has home health visited the patient within 72 hours of discharge? N/A   DME comments Wearing a 30 day cardiac monitor.   Psychosocial issues? No   What is the patient's perception of their health status since discharge? Improving  [pt reports she has had a few episodes of chest tightness since d/c, has some skin breakdown from the monitor but has gotten some medicine for that]   Is the patient/caregiver able to teach back the hierarchy of who to call/visit for symptoms/problems? PCP, Specialist, Home health nurse, Urgent Care, ED, 911 Yes   Week 3 Call Completed? Yes          PEPE DE JESUS - Registered Nurse

## 2022-12-16 ENCOUNTER — OFFICE VISIT (OUTPATIENT)
Dept: CARDIOLOGY | Facility: CLINIC | Age: 57
End: 2022-12-16

## 2022-12-16 VITALS
HEIGHT: 65 IN | WEIGHT: 218 LBS | HEART RATE: 62 BPM | DIASTOLIC BLOOD PRESSURE: 86 MMHG | SYSTOLIC BLOOD PRESSURE: 133 MMHG | BODY MASS INDEX: 36.32 KG/M2

## 2022-12-16 DIAGNOSIS — I44.7 LBBB (LEFT BUNDLE BRANCH BLOCK): ICD-10-CM

## 2022-12-16 DIAGNOSIS — I10 ESSENTIAL HYPERTENSION: ICD-10-CM

## 2022-12-16 DIAGNOSIS — I42.0 DILATED CARDIOMYOPATHY: Primary | ICD-10-CM

## 2022-12-16 DIAGNOSIS — I47.1 ATRIAL TACHYCARDIA: ICD-10-CM

## 2022-12-16 PROCEDURE — 99214 OFFICE O/P EST MOD 30 MIN: CPT | Performed by: INTERNAL MEDICINE

## 2022-12-16 RX ORDER — METOPROLOL SUCCINATE 25 MG/1
25 TABLET, EXTENDED RELEASE ORAL 2 TIMES DAILY
Qty: 180 TABLET | Refills: 3 | Status: SHIPPED | OUTPATIENT
Start: 2022-12-16

## 2022-12-16 RX ORDER — AMIODARONE HYDROCHLORIDE 200 MG/1
200 TABLET ORAL DAILY
Qty: 90 TABLET | Refills: 3 | Status: CANCELLED | OUTPATIENT
Start: 2022-12-16

## 2022-12-16 NOTE — PROGRESS NOTES
Chief Complaint  Cardiomyopathy, Hypertension, and LBBB    Subjective            Pam Cade presents to [facility] CARDIOLOGY  History of Present Illness    Pam is here for follow-up evaluation management of dilated cardiomyopathy, left bundle branch block, atrial tachycardia, essential hypertension.  Since last visit she is doing relatively well and overall feels better.  She denies excessive shortness of breath, palpitations or subjective tachycardia.    [facility]  Past Medical History:   Diagnosis Date   • Allergic rhinitis due to allergen 01/08/2021   • Ankle pain    • Arthritis    • B12 deficiency 02/22/2019   • Bladder disorder    • Condition not found     Ulcer   • Cramps of lower extremity    • Folic acid deficiency 02/22/2019   • Foot pain, left 03/20/2018   • High cholesterol    • Hypertension    • Kidney problem          SURGICALHX  Past Surgical History:   Procedure Laterality Date   • CARDIAC CATHETERIZATION N/A 3/3/2022    Procedure: Left Heart Cath - right radial;  Surgeon: CATHERINE Hall MD;  Location: [facility] CATH INVASIVE LOCATION;  Service: Cardiology;  Laterality: N/A;   • HIATAL HERNIA REPAIR  02/2019   • HYSTERECTOMY     • INCONTINENCE SURGERY     • LAPAROSCOPIC CHOLECYSTECTOMY  02/2019   • OTHER SURGICAL HISTORY      Artificial Joints/Limbs   • OTHER SURGICAL HISTORY      Joint surgery   • REVISION / REMOVAL NEUROSTIMULATOR     • TOTAL KNEE ARTHROPLASTY Left         SOC  Social History     Socioeconomic History   • Marital status:    Tobacco Use   • Smoking status: Never   • Smokeless tobacco: Never   Vaping Use   • Vaping Use: Never used   Substance and Sexual Activity   • Alcohol use: Yes     Comment: Current some day occasionally drinks, has been drinking for 6-10 years   • Drug use: Never   • Sexual activity: Defer         FAMHX  Family History   Problem Relation Age of Onset   • Cancer Sister    • Cancer Daughter    • Arthritis Daughter           ALLERGY  Allergies    Allergen Reactions   • Lortab [Hydrocodone-Acetaminophen] Itching     Pt states she takes tylenol at home        MEDSCURRENT    Current Outpatient Medications:   •  acetaminophen (TYLENOL) 325 MG tablet, Take 2 tablets by mouth Every 4 (Four) Hours As Needed for Mild Pain. Pt states not allergic to tylenol, Disp: , Rfl:   •  albuterol sulfate  (90 Base) MCG/ACT inhaler, Take 1-2 Puffs every 4 hours as needed, Disp: 8.5 g, Rfl: 1  •  ascorbic acid (VITAMIN C) 250 MG tablet, Take 250 mg by mouth Daily., Disp: , Rfl:   •  cetirizine (zyrTEC) 10 MG tablet, Take 1 tablet by mouth every night at bedtime. (Patient taking differently: Take 10 mg by mouth Daily.), Disp: 90 tablet, Rfl: 1  •  Cholecalciferol (Vitamin D3) 50 MCG (2000 UT) capsule, Take 1 capsule by mouth Daily., Disp: 90 capsule, Rfl: 1  •  colestipol (COLESTID) 1 g tablet, Take 1 tablet by mouth 2 (Two) Times a Day. (Patient taking differently: Take 1 g by mouth Daily.), Disp: 180 tablet, Rfl: 1  •  cyanocobalamin 1000 MCG/ML injection, Inject 1 mL into the appropriate muscle as directed by prescriber Every 30 (Thirty) Days., Disp: 1 mL, Rfl: 5  •  eletriptan (RELPAX) 40 MG tablet, Take 40 mg by mouth 1 (One) Time As Needed for Migraine. may repeat in 2 hours if necessary, Disp: , Rfl:   •  fluticasone (FLONASE) 50 MCG/ACT nasal spray, Instill 2 sprays into the nostril(s) as directed by provider Daily., Disp: 16 g, Rfl: 1  •  folic acid (FOLVITE) 1 MG tablet, Take 1 mg by mouth Daily., Disp: , Rfl:   •  metoprolol succinate XL (TOPROL-XL) 25 MG 24 hr tablet, Take 1 tablet by mouth 2 (Two) Times a Day., Disp: 180 tablet, Rfl: 3  •  montelukast (SINGULAIR) 10 MG tablet, Take 1 tablet by mouth Every Night., Disp: 90 tablet, Rfl: 1  •  Multiple Vitamins-Minerals (b complex-C-E-zinc) tablet, Take 1 tablet by mouth Daily., Disp: , Rfl:   •  sacubitril-valsartan (ENTRESTO) 49-51 MG tablet, Take 1 tablet by mouth 2 (Two) Times a Day., Disp: 180 tablet, Rfl:  "3  •  spironolactone (ALDACTONE) 25 MG tablet, Take 1 tablet by mouth Daily., Disp: 90 tablet, Rfl: 3  •  valACYclovir (VALTREX) 500 MG tablet, Take 1 tablet by mouth 2 (Two) Times a Day. (Patient taking differently: Take 500 mg by mouth 2 (Two) Times a Day As Needed.), Disp: 60 tablet, Rfl: 1  •  Echinacea 400 MG capsule, Take 400 mg by mouth Daily., Disp: , Rfl:   No current facility-administered medications for this visit.      Review of Systems   Cardiovascular: Negative for chest pain and palpitations.   Respiratory: Negative for shortness of breath.         Objective     /86   Pulse 62   Ht 165.1 cm (65\")   Wt 98.9 kg (218 lb)   BMI 36.28 kg/m²       General Appearance:   · well developed  · well nourished  HENT:   · oropharynx moist  · lips not cyanotic  Neck:  · thyroid not enlarged  · supple  Respiratory:  · no respiratory distress  · normal breath sounds  · no rales  Cardiovascular:  · no jugular venous distention  · regular rhythm  · apical impulse normal  · S1 normal, S2 normal  · no S3, no S4   · no murmur  · no rub, no thrill  · carotid pulses normal; no bruit  · pedal pulses normal  · lower extremity edema: none    Musculoskeletal:  · no clubbing of fingers.   · normocephalic, head atraumatic  Skin:   · warm, dry  Psychiatric:  · judgement and insight appropriate  · normal mood and affect      Result Review :     The following data was reviewed by: Juan Hall MD on 12/16/2022:    CMP    CMP 11/2/22 11/8/22 11/9/22   Glucose 101 (A) 77 106 (A)   BUN 9 7 13   Creatinine 0.49 (A) 0.80 0.78   Sodium 141 141 139   Potassium 4.3 4.4 4.2   Chloride 108 (A) 105 104   Calcium 8.4 (A) 9.6 9.9   Albumin 3.70 4.40 4.40   Total Bilirubin <0.2 0.3 0.2   Alkaline Phosphatase 131 (A) 110 117   AST (SGOT) 87 (A) 19 17   ALT (SGPT) 65 (A) 23 21   (A) Abnormal value            CBC    CBC 11/1/22 11/2/22 11/9/22   WBC 12.39 (A) 5.25 6.93   RBC 4.10 3.91 4.26   Hemoglobin 12.1 11.2 (A) 12.3 "   Hematocrit 35.1 34.0 37.2   MCV 85.6 87.0 87.3   MCH 29.5 28.6 28.9   MCHC 34.5 32.9 33.1   RDW 12.6 12.8 12.7   Platelets 304 295 364   (A) Abnormal value            Lipid Panel    Lipid Panel 4/6/22 11/8/22   Total Cholesterol 220 (A) 200   Triglycerides 93 96   HDL Cholesterol 68 (A) 67 (A)   VLDL Cholesterol 16 17   LDL Cholesterol  136 (A) 116 (A)   LDL/HDL Ratio 1.96 1.70   (A) Abnormal value            TSH    TSH 4/6/22 11/1/22 11/8/22   TSH 1.810 1.930 4.360 (A)   (A) Abnormal value              Data reviewed: Primary care records reviewed.  Event monitor reviewed from November, that showed sinus rhythm with occasional ectopy but no breakthrough arrhythmias    Procedures                  Assessment and Plan        ASSESSMENT:  Encounter Diagnoses   Name Primary?   • Dilated cardiomyopathy (HCC) Yes   • Atrial tachycardia (HCC)    • Essential hypertension    • LBBB (left bundle branch block)          PLAN:    1.  Dilated cardiomyopathy-clinically stable, echocardiogram in August showed some improvement in her ejection fraction compared to March.  Continue guideline directed medical therapy, repeat echo in April  2.  Atrial tachycardia- she has not had clinical breakthrough since starting amiodarone.  She had her initial episodes at the time that her left ventricular ejection fraction was more severely reduced.  Now that she is more stable in that regard I discussed with her that keeping her on amiodarone therapy long-term is potentially risking toxicities.  After discussing with her today I am going to take her off of the amiodarone for a time and if she has significant breakthroughs of atrial tachycardia we can consider other therapies.  I am increasing her metoprolol to 50 mg total daily as well.  3.  Essential hypertension, controlled, continue current medical therapy  4.  Left bundle branch block-currently the patient is compensated and we will follow clinically but I do not think CRT pacing device at  this time is needed.      Follow-up in April after echo        Patient was given instructions and counseling regarding her condition or for health maintenance advice. Please see specific information pulled into the AVS if appropriate.             CATHERINE Hlal MD  12/16/2022    13:13 EST

## 2023-01-03 ENCOUNTER — APPOINTMENT (OUTPATIENT)
Dept: GENERAL RADIOLOGY | Facility: HOSPITAL | Age: 58
End: 2023-01-03
Payer: COMMERCIAL

## 2023-01-03 ENCOUNTER — HOSPITAL ENCOUNTER (EMERGENCY)
Facility: HOSPITAL | Age: 58
Discharge: HOME | End: 2023-01-03
Attending: EMERGENCY MEDICINE | Admitting: EMERGENCY MEDICINE
Payer: COMMERCIAL

## 2023-01-03 VITALS
BODY MASS INDEX: 41.38 KG/M2 | DIASTOLIC BLOOD PRESSURE: 91 MMHG | SYSTOLIC BLOOD PRESSURE: 139 MMHG | HEIGHT: 62 IN | OXYGEN SATURATION: 94 % | RESPIRATION RATE: 18 BRPM | TEMPERATURE: 97 F | HEART RATE: 88 BPM | WEIGHT: 224.87 LBS

## 2023-01-03 DIAGNOSIS — R00.0 TACHYARRHYTHMIA: Primary | ICD-10-CM

## 2023-01-03 LAB
ALBUMIN SERPL-MCNC: 4.4 G/DL (ref 3.5–5.2)
ALBUMIN/GLOB SERPL: 1.4 G/DL
ALP SERPL-CCNC: 106 U/L (ref 39–117)
ALT SERPL W P-5'-P-CCNC: 17 U/L (ref 1–33)
ANION GAP SERPL CALCULATED.3IONS-SCNC: 14 MMOL/L (ref 5–15)
ANISOCYTOSIS BLD QL: ABNORMAL
AST SERPL-CCNC: 22 U/L (ref 1–32)
BASOPHILS # BLD MANUAL: 0.09 10*3/MM3 (ref 0–0.2)
BASOPHILS NFR BLD MANUAL: 1 % (ref 0–1.5)
BILIRUB SERPL-MCNC: 0.3 MG/DL (ref 0–1.2)
BUN SERPL-MCNC: 11 MG/DL (ref 6–20)
BUN/CREAT SERPL: 12.6 (ref 7–25)
CALCIUM SPEC-SCNC: 9.7 MG/DL (ref 8.6–10.5)
CHLORIDE SERPL-SCNC: 104 MMOL/L (ref 98–107)
CO2 SERPL-SCNC: 19 MMOL/L (ref 22–29)
CREAT SERPL-MCNC: 0.87 MG/DL (ref 0.57–1)
DEPRECATED RDW RBC AUTO: 43 FL (ref 37–54)
EGFRCR SERPLBLD CKD-EPI 2021: 77.8 ML/MIN/1.73
ERYTHROCYTE [DISTWIDTH] IN BLOOD BY AUTOMATED COUNT: 13.9 % (ref 12.3–15.4)
GLOBULIN UR ELPH-MCNC: 3.2 GM/DL
GLUCOSE SERPL-MCNC: 176 MG/DL (ref 65–99)
HCT VFR BLD AUTO: 38 % (ref 34–46.6)
HGB BLD-MCNC: 12.6 G/DL (ref 12–15.9)
HOLD SPECIMEN: NORMAL
HOLD SPECIMEN: NORMAL
HYPOCHROMIA BLD QL: ABNORMAL
LYMPHOCYTES # BLD MANUAL: 4.92 10*3/MM3 (ref 0.7–3.1)
LYMPHOCYTES NFR BLD MANUAL: 10 % (ref 5–12)
MAGNESIUM SERPL-MCNC: 2 MG/DL (ref 1.6–2.6)
MCH RBC QN AUTO: 28 PG (ref 26.6–33)
MCHC RBC AUTO-ENTMCNC: 33.2 G/DL (ref 31.5–35.7)
MCV RBC AUTO: 84.4 FL (ref 79–97)
MICROCYTES BLD QL: ABNORMAL
MONOCYTES # BLD: 0.95 10*3/MM3 (ref 0.1–0.9)
NEUTROPHILS # BLD AUTO: 3.5 10*3/MM3 (ref 1.7–7)
NEUTROPHILS NFR BLD MANUAL: 37 % (ref 42.7–76)
NT-PROBNP SERPL-MCNC: 531.9 PG/ML (ref 0–900)
PLATELET # BLD AUTO: 333 10*3/MM3 (ref 140–450)
PMV BLD AUTO: 9.6 FL (ref 6–12)
POTASSIUM SERPL-SCNC: 3.1 MMOL/L (ref 3.5–5.2)
PROT SERPL-MCNC: 7.6 G/DL (ref 6–8.5)
RBC # BLD AUTO: 4.5 10*6/MM3 (ref 3.77–5.28)
SCAN SLIDE: NORMAL
SMALL PLATELETS BLD QL SMEAR: ADEQUATE
SODIUM SERPL-SCNC: 137 MMOL/L (ref 136–145)
TROPONIN I SERPL-MCNC: 0.01 NG/ML (ref 0–0.08)
TROPONIN I SERPL-MCNC: 0.03 NG/ML (ref 0–0.08)
TROPONIN T SERPL-MCNC: <0.01 NG/ML (ref 0–0.03)
TSH SERPL DL<=0.05 MIU/L-ACNC: 2.82 UIU/ML (ref 0.27–4.2)
VARIANT LYMPHS NFR BLD MANUAL: 44 % (ref 19.6–45.3)
VARIANT LYMPHS NFR BLD MANUAL: 8 % (ref 0–5)
WBC MORPH BLD: NORMAL
WBC NRBC COR # BLD: 9.46 10*3/MM3 (ref 3.4–10.8)
WHOLE BLOOD HOLD COAG: NORMAL
WHOLE BLOOD HOLD SPECIMEN: NORMAL

## 2023-01-03 PROCEDURE — 83880 ASSAY OF NATRIURETIC PEPTIDE: CPT | Performed by: EMERGENCY MEDICINE

## 2023-01-03 PROCEDURE — 84484 ASSAY OF TROPONIN QUANT: CPT | Performed by: EMERGENCY MEDICINE

## 2023-01-03 PROCEDURE — 93010 ELECTROCARDIOGRAM REPORT: CPT | Performed by: INTERNAL MEDICINE

## 2023-01-03 PROCEDURE — 83735 ASSAY OF MAGNESIUM: CPT | Performed by: EMERGENCY MEDICINE

## 2023-01-03 PROCEDURE — 84484 ASSAY OF TROPONIN QUANT: CPT

## 2023-01-03 PROCEDURE — 80050 GENERAL HEALTH PANEL: CPT | Performed by: EMERGENCY MEDICINE

## 2023-01-03 PROCEDURE — 36415 COLL VENOUS BLD VENIPUNCTURE: CPT

## 2023-01-03 PROCEDURE — 85007 BL SMEAR W/DIFF WBC COUNT: CPT | Performed by: EMERGENCY MEDICINE

## 2023-01-03 PROCEDURE — 93005 ELECTROCARDIOGRAM TRACING: CPT | Performed by: EMERGENCY MEDICINE

## 2023-01-03 PROCEDURE — 71045 X-RAY EXAM CHEST 1 VIEW: CPT

## 2023-01-03 PROCEDURE — 99284 EMERGENCY DEPT VISIT MOD MDM: CPT

## 2023-01-03 RX ORDER — POTASSIUM CHLORIDE 750 MG/1
40 CAPSULE, EXTENDED RELEASE ORAL ONCE
Status: COMPLETED | OUTPATIENT
Start: 2023-01-03 | End: 2023-01-03

## 2023-01-03 RX ORDER — SODIUM CHLORIDE 0.9 % (FLUSH) 0.9 %
10 SYRINGE (ML) INJECTION AS NEEDED
Status: DISCONTINUED | OUTPATIENT
Start: 2023-01-03 | End: 2023-01-04 | Stop reason: HOSPADM

## 2023-01-03 RX ADMIN — POTASSIUM CHLORIDE 40 MEQ: 10 CAPSULE, COATED, EXTENDED RELEASE ORAL at 21:33

## 2023-01-04 ENCOUNTER — TELEPHONE (OUTPATIENT)
Dept: CARDIOLOGY | Facility: CLINIC | Age: 58
End: 2023-01-04
Payer: COMMERCIAL

## 2023-01-04 RX ORDER — AMIODARONE HYDROCHLORIDE 200 MG/1
200 TABLET ORAL 2 TIMES DAILY
Qty: 180 TABLET | Refills: 3 | Status: SHIPPED | OUTPATIENT
Start: 2023-01-04

## 2023-01-04 NOTE — TELEPHONE ENCOUNTER
Received VM from patient. Patient states yesterday she had to go to ER due to heart racing and HR staying 130 and feeling \"funny\".   At last OV 12/16 amiodarone was stopped and patient instructed to call if experiencing any increased HR.     Please review and advise

## 2023-01-04 NOTE — ED PROVIDER NOTES
Time: 7:11 PM EST  Date of encounter:  1/3/2023  Independent Historian/Clinical History and Information was obtained by:   Patient and Family  Chief Complaint: Rapid Heart Rate    History is limited by: N/A    History of Present Illness:      Patient is a 57 y.o. year old female who presents to the emergency department for evaluation of rapid heart rate. Daughter reports that pt has a history of heart arrhythmia and A. Fib(3 months) and currently sees a cardiologist. Daughter reports a heart rate of over 130 initially. Daughter reports that pt istaking meds that make her feel \"funny\".   Daughter reports a history of heart blockages but not significant enough to require stents. Daughter reports that pt has been at baseline and symptoms were sudden. Daughter reports that pt has chronic diarrhea,   palpatations, lightheadedness, SOB, and chest heaviness, but denies LOC      History provided by:  Patient and relative   used: No        Patient Care Team  Primary Care Provider: Ally Barnard APRN    Past Medical History:     Allergies   Allergen Reactions   • Lortab [Hydrocodone-Acetaminophen] Itching     Pt states she takes tylenol at home     Past Medical History:   Diagnosis Date   • Allergic rhinitis due to allergen 01/08/2021   • Ankle pain    • Arthritis    • B12 deficiency 02/22/2019   • Bladder disorder    • Condition not found     Ulcer   • Cramps of lower extremity    • Folic acid deficiency 02/22/2019   • Foot pain, left 03/20/2018   • High cholesterol    • Hypertension    • Kidney problem      Past Surgical History:   Procedure Laterality Date   • CARDIAC CATHETERIZATION N/A 3/3/2022    Procedure: Left Heart Cath - right radial;  Surgeon: CATHERINE Hall MD;  Location: [facility] INVASIVE LOCATION;  Service: Cardiology;  Laterality: N/A;   • HIATAL HERNIA REPAIR  02/2019   • HYSTERECTOMY     • INCONTINENCE SURGERY     • LAPAROSCOPIC CHOLECYSTECTOMY  02/2019   • OTHER SURGICAL  HISTORY      Artificial Joints/Limbs   • OTHER SURGICAL HISTORY      Joint surgery   • REVISION / REMOVAL NEUROSTIMULATOR     • TOTAL KNEE ARTHROPLASTY Left      Family History   Problem Relation Age of Onset   • Cancer Sister    • Cancer Daughter    • Arthritis Daughter        Home Medications:  Prior to Admission medications    Medication Sig Start Date End Date Taking? Authorizing Provider   acetaminophen (TYLENOL) 325 MG tablet Take 2 tablets by mouth Every 4 (Four) Hours As Needed for Mild Pain. Pt states not allergic to tylenol    Lito Berg MD   albuterol sulfate  (90 Base) MCG/ACT inhaler Take 1-2 Puffs every 4 hours as needed 9/7/21   Ally Barnard APRN   ascorbic acid (VITAMIN C) 250 MG tablet Take 250 mg by mouth Daily.    ProviderLito MD   cetirizine (zyrTEC) 10 MG tablet Take 1 tablet by mouth every night at bedtime.  Patient taking differently: Take 10 mg by mouth Daily. 4/6/22   Ally Barnard APRN   Cholecalciferol (Vitamin D3) 50 MCG (2000 UT) capsule Take 1 capsule by mouth Daily. 7/8/21   Ally Barnard APRN   colestipol (COLESTID) 1 g tablet Take 1 tablet by mouth 2 (Two) Times a Day.  Patient taking differently: Take 1 g by mouth Daily. 11/8/22   Ally Barnard APRN   cyanocobalamin 1000 MCG/ML injection Inject 1 mL into the appropriate muscle as directed by prescriber Every 30 (Thirty) Days. 11/8/22   Ally Barnard APRN   Echinacea 400 MG capsule Take 400 mg by mouth Daily.    ProviderLito MD   eletriptan (RELPAX) 40 MG tablet Take 40 mg by mouth 1 (One) Time As Needed for Migraine. may repeat in 2 hours if necessary    Lito Berg MD   fluticasone (FLONASE) 50 MCG/ACT nasal spray Instill 2 sprays into the nostril(s) as directed by provider Daily. 7/8/21   Ally Barnard APRN   folic acid (FOLVITE) 1 MG tablet Take 1 mg by mouth Daily.    Lito Berg MD   metoprolol  succinate XL (TOPROL-XL) 25 MG 24 hr tablet Take 1 tablet by mouth 2 (Two) Times a Day. 12/16/22   CATHERINE Hall MD   montelukast (SINGULAIR) 10 MG tablet Take 1 tablet by mouth Every Night. 4/6/22   Ally Barnard APRN   Multiple Vitamins-Minerals (b complex-C-E-zinc) tablet Take 1 tablet by mouth Daily.    Provider, MD Lito   sacubitril-valsartan (ENTRESTO) 49-51 MG tablet Take 1 tablet by mouth 2 (Two) Times a Day. 12/16/22   CATHERINE Hall MD   spironolactone (ALDACTONE) 25 MG tablet Take 1 tablet by mouth Daily. 8/19/22   CATHERINE Hall MD   valACYclovir (VALTREX) 500 MG tablet Take 1 tablet by mouth 2 (Two) Times a Day.  Patient taking differently: Take 500 mg by mouth 2 (Two) Times a Day As Needed. 1/24/22   Ally Barnard APRN        Social History:   Social History     Tobacco Use   • Smoking status: Never   • Smokeless tobacco: Never   Vaping Use   • Vaping Use: Never used   Substance Use Topics   • Alcohol use: Yes     Comment: Current some day occasionally drinks, has been drinking for 6-10 years   • Drug use: Never         Review of Systems:  Review of Systems   Constitutional: Negative for chills and fever.   HENT: Negative for congestion, rhinorrhea and sore throat.    Eyes: Negative for photophobia.   Respiratory: Positive for shortness of breath. Negative for apnea, cough and chest tightness.    Cardiovascular: Positive for palpitations. Negative for chest pain.   Gastrointestinal: Negative for abdominal pain, diarrhea, nausea and vomiting.   Endocrine: Negative.    Genitourinary: Negative for difficulty urinating and dysuria.   Musculoskeletal: Negative for back pain, joint swelling and myalgias.   Skin: Negative for color change and wound.   Allergic/Immunologic: Negative.    Neurological: Positive for light-headedness. Negative for seizures and headaches.   Psychiatric/Behavioral: Negative.    All other systems reviewed and are negative.       Physical Exam:  BP  142/94 (BP Location: Left arm, Patient Position: Sitting)   Pulse 89   Temp 97 °F (36.1 °C) (Oral)   Resp 18   Ht 157.5 cm (62\")   Wt 102 kg (224 lb 13.9 oz)   SpO2 94%   BMI 41.13 kg/m²     Physical Exam  Vitals and nursing note reviewed.   Constitutional:       General: She is awake.      Appearance: Normal appearance.   HENT:      Head: Normocephalic and atraumatic.      Nose: Nose normal.      Mouth/Throat:      Mouth: Mucous membranes are moist.   Eyes:      Extraocular Movements: Extraocular movements intact.      Pupils: Pupils are equal, round, and reactive to light.   Cardiovascular:      Rate and Rhythm: Regular rhythm. Tachycardia present.      Heart sounds: Normal heart sounds.      Comments: Sinus Tachycardia HR initially in the 130's and now down to less than 105.  Pulmonary:      Effort: Pulmonary effort is normal. No respiratory distress.      Breath sounds: Normal breath sounds. No wheezing, rhonchi or rales.   Abdominal:      General: Bowel sounds are normal.      Palpations: Abdomen is soft.      Tenderness: There is no abdominal tenderness. There is no guarding or rebound.      Comments: No rigidity   Musculoskeletal:         General: No tenderness. Normal range of motion.      Cervical back: Normal range of motion and neck supple.   Skin:     General: Skin is warm and dry.      Coloration: Skin is not jaundiced.   Neurological:      General: No focal deficit present.      Mental Status: She is alert and oriented to person, place, and time. Mental status is at baseline.      Sensory: Sensation is intact.      Motor: Motor function is intact.      Coordination: Coordination is intact.   Psychiatric:         Attention and Perception: Attention and perception normal.         Mood and Affect: Mood and affect normal.         Speech: Speech normal.         Behavior: Behavior normal.         Judgment: Judgment normal.                  Procedures:  Procedures      Medical Decision  Making:      Comorbidities that affect care:    Atrial Fibrillation    External Notes reviewed:    None      The following orders were placed and all results were independently analyzed by me:  Orders Placed This Encounter   Procedures   • XR Chest 1 View   • Deer Draw   • Comprehensive Metabolic Panel   • Magnesium   • Troponin   • CBC Auto Differential   • TSH Rfx On Abnormal To Free T4   • BNP   • Scan Slide   • Manual Differential   • NPO Diet NPO Type: Strict NPO   • Undress & Gown   • Cardiac Monitoring   • Continuous Pulse Oximetry   • Cardiology (on-call MD unless specified)   • Oxygen Therapy- Nasal Cannula; 2 LPM; Titrate for SPO2: 92%   • POC Troponin I   • POC Troponin I   • POC Troponin I   • ECG 12 Lead ED Triage Standing Order; Dysrhythmia   • ECG 12 Lead Rhythm Change   • ECG 12 Lead Chest Pain   • Insert Peripheral IV   • CBC & Differential   • Green Top (Gel)   • Lavender Top   • Gold Top - SST   • Light Blue Top   • POC Troponin I with Hold Tube   • HOLD Troponin-I Tube       Medications Given in the Emergency Department:  Medications   sodium chloride 0.9 % flush 10 mL (has no administration in time range)   potassium chloride (MICRO-K) CR capsule 40 mEq (40 mEq Oral Given 1/3/23 2133)        ED Course:    ED Course as of 01/03/23 2236 Tue Jan 03, 2023 2119 Case discussed with Dr. Garcia for consultation.  He does recommend observation with cardiology consultation with Dr. Hall in the morning.  Patient may need to be restarted on antiarrhythmics. [RP]      ED Course User Index  [RP] Eric Capone MD       Labs:    Lab Results (last 24 hours)     Procedure Component Value Units Date/Time    CBC & Differential [[id number]] Collected: 01/03/23 1914    Specimen: Blood Updated: 01/03/23 1959    Narrative:      The following orders were created for panel order CBC & Differential.  Procedure                               Abnormality         Status                     ---------                                -----------         ------                     CBC Auto Differential[[id number]]        Normal              Final result               Scan Slide[[id number]]                                       Final result                 Please view results for these tests on the individual orders.    Comprehensive Metabolic Panel [[id number]]  (Abnormal) Collected: 01/03/23 1914    Specimen: Blood Updated: 01/03/23 2001     Glucose 176 mg/dL      BUN 11 mg/dL      Creatinine 0.87 mg/dL      Sodium 137 mmol/L      Potassium 3.1 mmol/L      Chloride 104 mmol/L      CO2 19.0 mmol/L      Calcium 9.7 mg/dL      Total Protein 7.6 g/dL      Albumin 4.4 g/dL      ALT (SGPT) 17 U/L      AST (SGOT) 22 U/L      Alkaline Phosphatase 106 U/L      Total Bilirubin 0.3 mg/dL      Globulin 3.2 gm/dL      A/G Ratio 1.4 g/dL      BUN/Creatinine Ratio 12.6     Anion Gap 14.0 mmol/L      eGFR 77.8 mL/min/1.73      Comment: National Kidney Foundation and American Society of Nephrology (ASN) Task Force recommended calculation based on the Chronic Kidney Disease Epidemiology Collaboration (CKD-EPI) equation refit without adjustment for race.       Narrative:      GFR Normal >60  Chronic Kidney Disease <60  Kidney Failure <15      Magnesium [[id number]]  (Normal) Collected: 01/03/23 1914    Specimen: Blood Updated: 01/03/23 2001     Magnesium 2.0 mg/dL     Troponin [[id number]]  (Normal) Collected: 01/03/23 1914    Specimen: Blood Updated: 01/03/23 2017     Troponin T <0.010 ng/mL     Narrative:      Troponin T Reference Range:  <= 0.03 ng/mL-   Negative for AMI  >0.03 ng/mL-     Abnormal for myocardial necrosis.  Clinicians would have to utilize clinical acumen, EKG, Troponin and serial changes to determine if it is an Acute Myocardial Infarction or myocardial injury due to an underlying chronic condition.       Results may be falsely decreased if patient taking Biotin.      CBC Auto Differential [[id number]]  (Normal) Collected: 01/03/23 1914     Specimen: Blood Updated: 01/03/23 1959     WBC 9.46 10*3/mm3      RBC 4.50 10*6/mm3      Hemoglobin 12.6 g/dL      Hematocrit 38.0 %      MCV 84.4 fL      MCH 28.0 pg      MCHC 33.2 g/dL      RDW 13.9 %      RDW-SD 43.0 fl      MPV 9.6 fL      Platelets 333 10*3/mm3     POC Troponin I [[id number]]  (Normal) Collected: 01/03/23 1914    Specimen: Blood Updated: 01/03/23 1927     Troponin I 0.01 ng/mL      Comment: Serial Number: [id number]:  [id number]       TSH Rfx On Abnormal To Free T4 [[id number]]  (Normal) Collected: 01/03/23 1914    Specimen: Blood Updated: 01/03/23 2017     TSH 2.820 uIU/mL     BNP [[id number]]  (Normal) Collected: 01/03/23 1914    Specimen: Blood Updated: 01/03/23 2017     proBNP 531.9 pg/mL     Narrative:      Among patients with dyspnea, NT-proBNP is highly sensitive for the detection of acute congestive heart failure. In addition NT-proBNP of <300 pg/ml effectively rules out acute congestive heart failure with 99% negative predictive value.      Scan Slide [[id number]] Collected: 01/03/23 1914    Specimen: Blood Updated: 01/03/23 1959     Scan Slide --     Comment: See Manual Differential Results       Manual Differential [[id number]]  (Abnormal) Collected: 01/03/23 1914    Specimen: Blood Updated: 01/03/23 1959     Neutrophil % 37.0 %      Lymphocyte % 44.0 %      Monocyte % 10.0 %      Basophil % 1.0 %      Atypical Lymphocyte % 8.0 %      Neutrophils Absolute 3.50 10*3/mm3      Lymphocytes Absolute 4.92 10*3/mm3      Monocytes Absolute 0.95 10*3/mm3      Basophils Absolute 0.09 10*3/mm3      Anisocytosis Slight/1+     Hypochromia Slight/1+     Microcytes Slight/1+     WBC Morphology Normal     Platelet Estimate Adequate    POC Troponin I [[id number]]  (Normal) Collected: 01/03/23 2137    Specimen: Blood Updated: 01/03/23 2150     Troponin I 0.03 ng/mL      Comment: Serial Number: [id number]:  [id number]              Imaging:    XR Chest 1 View    Result Date: 1/3/2023  PROCEDURE: XR CHEST 1  VW  COMPARISON: 11/9/2022.  INDICATIONS: Dysrhythmia.  FINDINGS:  A single AP (or PA) upright portable chest radiograph was performed.  No cardiac enlargement is seen.  No acute infiltrate is appreciated.  No pleural effusion or pneumothorax is identified.  External artifacts obscure detail.  Chronic calcified granulomatous disease involves the chest.  The thoracic aorta is atherosclerotic and ectatic.  No significant interval change is seen since the prior study (or studies).        No acute infiltrate is appreciated.     Please note that portions of this note were completed with a voice recognition program.  SHANIQUA SAMANIEGO JR, MD       Electronically Signed and Approved By: SHANIQUA SAMANIEGO JR, MD on 1/03/2023 at 20:41                  Differential Diagnosis and Discussion:    Chest Pain:  Based on the patient's signs and symptoms, I considered aortic dissection, myocardial infaction, pulmonary embolism, cardiac tamponade, pericarditis, pneumothorax, musculoskeletal chest pain and other differential diagnosis as an etiology of the patient's chest pain.   Dyspnea: Differential diagnosis includes but is not limited to metabolic acidosis, neurological disorders, psychogenic, asthma, pneumothorax, upper airway obstruction, COPD, pneumonia, noncardiogenic pulmonary edema, interstitial lung disease, anemia, congestive heart failure, and pulmonary embolism  Palpitations: Differential diagnosis includes but is not limited to anxiety, atrioventricular blocks, mitral valve disease, hypoxia, coronary artery disease, hypokalemia, anemia, fever, COPD, congestive heart failure, pericarditis, Juaquin-Parkinson-White syndrome, pulmonary embolism, SVT, atrial fibrillation, atrial flutter, sinus tachycardia, thyrotoxicosis, and pheochromocytoma.    All labs were reviewed and analyzed by me.    MDM  Number of Diagnoses or Management Options  Tachyarrhythmia  Diagnosis management comments: Patient is aware Dr. Garcia recommended  observation.  She understands the potential consequences of her decision to go home.  I do feel like it is safe for her to follow-up as an outpatient tomorrow she would prefer to do.       Amount and/or Complexity of Data Reviewed  Clinical lab tests: reviewed  Tests in the radiology section of CPT®: reviewed  Tests in the medicine section of CPT®: reviewed  Decide to obtain previous medical records or to obtain history from someone other than the patient: yes  Discuss the patient with other providers: yes  Independent visualization of images, tracings, or specimens: yes    Risk of Complications, Morbidity, and/or Mortality  Presenting problems: high  Management options: low    Patient Progress  Patient progress: resolved           Patient Care Considerations:    I considered ordering a CT scan of the chest, however Symptoms resolved after her arrhythmia resolved.      Consultants/Shared Management Plan:    Consultant: I have discussed the case with Dr. Garcia, cardiology who states Patient could be observed overnight until she can see Dr. Hall in the morning.    Social Determinants of Health:    Patient is independent, reliable, and has access to care.       Disposition and Care Coordination:    Discharged: I considered escalation of care by admitting this patient for observation, however the patient has improved and is suitable and  stable for discharge.  Patient declined observation.  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  They have excellent follow-up and feels strongly that Dr. Hall will return the call first thing tomorrow morning.  Patient has no CAD on her recent heart cath within the past 1 year.  She has now been in the ED 4 hours with no return of arrhythmia.    I have explained discharge medications and the need for follow up with the patient/caretakers. This was also printed in the discharge instructions. Patient was discharged with the following medications and follow up:      Medication List       Changed    cetirizine 10 MG tablet  Commonly known as: zyrTEC  Take 1 tablet by mouth every night at bedtime.  What changed: when to take this     colestipol 1 g tablet  Commonly known as: COLESTID  Take 1 tablet by mouth 2 (Two) Times a Day.  What changed: when to take this     valACYclovir 500 MG tablet  Commonly known as: VALTREX  Take 1 tablet by mouth 2 (Two) Times a Day.  What changed:   · when to take this  · reasons to take this         No follow-up provider specified.     Final diagnoses:   Tachyarrhythmia        ED Disposition     ED Disposition   Discharge    Condition   Stable    Comment   --             This medical record created using voice recognition software.        Documentation assistance provided by Tyrell Stafford, acting as scribe for Eric Capone MD. Information recorded by the scribe was done at my direction and has been verified and validated by me.       Tyrell Stafford  01/03/23 2911       Eric Capone MD  01/03/23 1757

## 2023-01-04 NOTE — TELEPHONE ENCOUNTER
Yes I think we need to resume the amiodarone since she had a recurrence of the rapid heart rhythm this soon after stopping, I will order to the pharmacy with initial loading dose and then maintenance dosing after that

## 2023-01-04 NOTE — DISCHARGE INSTRUCTIONS
Return to the emergency department immediately for return/worsening of symptoms.  Follow-up with Dr. Hall as we discussed there is thing tomorrow morning.  The important question that must be answered is whether or not to resume an antiarrhythmic such as the amiodarone you were recently taking.

## 2023-01-05 LAB
QT INTERVAL: 361 MS
QT INTERVAL: 406 MS
QT INTERVAL: 410 MS

## 2023-01-06 ENCOUNTER — OFFICE VISIT (OUTPATIENT)
Dept: FAMILY MEDICINE CLINIC | Facility: CLINIC | Age: 58
End: 2023-01-06
Payer: COMMERCIAL

## 2023-01-06 VITALS
OXYGEN SATURATION: 97 % | SYSTOLIC BLOOD PRESSURE: 127 MMHG | HEIGHT: 62 IN | BODY MASS INDEX: 40.3 KG/M2 | HEART RATE: 77 BPM | TEMPERATURE: 97.4 F | WEIGHT: 219 LBS | DIASTOLIC BLOOD PRESSURE: 86 MMHG

## 2023-01-06 DIAGNOSIS — R42 LIGHTHEADED: ICD-10-CM

## 2023-01-06 DIAGNOSIS — H53.9 VISION CHANGES: ICD-10-CM

## 2023-01-06 DIAGNOSIS — G44.85 STABBING HEADACHE: ICD-10-CM

## 2023-01-06 DIAGNOSIS — I10 PRIMARY HYPERTENSION: ICD-10-CM

## 2023-01-06 DIAGNOSIS — R00.0 TACHYCARDIA: Primary | ICD-10-CM

## 2023-01-06 PROCEDURE — 99213 OFFICE O/P EST LOW 20 MIN: CPT | Performed by: NURSE PRACTITIONER

## 2023-01-06 NOTE — PROGRESS NOTES
ACUTE VISIT     Patient Name: Pam Cade  : 1965   MRN: [id number]     Chief Complaint:    Chief Complaint   Patient presents with   • ER follow up       History of Present Illness: Pam Cade is a 58 y.o. female who is here today for an ER follow up.    Patient presented to the ER 1/3/23 for rapid heart rate. She has a history of Afib and arrhythmia. Heart rate was reported to be 130+.  She had a normal chest xray.  EKG showed Sinus tachycardia. Multiform ventricular premature complexes. Left bundle branch block. ST elevation secondary to IVCD. When compared with ECG of 2022 18:23:28,  Significant rate increase.  Follow up with cardiology Dr. Hall 2023    She has been having a sharp stabbing headache on the back right side of her head. Started  intermittent, new onset at this time    She says this happens when her BP and heart rate are up. She also gets lightheaded and vision changes.  States has headaches but stabbing pain is different  PROCEDURE:  CT HEAD WO CONTRAST     COMPARISON:  [facility], CT, HEAD W/O CONTRAST, 2003, 12:19.  INDICATIONS:  HA'S, POSTERIOR NECK PAIN RADIATING DOWN RIGHT ARM X 1 MONTH.     PROTOCOL:     Standard imaging protocol performed                 RADIATION:      DLP: 952.5mGy*cm               MA and/or KV was adjusted to minimize radiation dose.                     TECHNIQUE:    After obtaining the patient's consent, CT images were obtained without non-ionic   intravenous contrast material.      FINDINGS:          The cerebral sulci, fissures, ventricles, and basal cisterns are within range of normal.  There is   no acute hemorrhage, midline shift, or suspicious extra-axial fluid collections.  The orbital   contents are normal.  The mastoid sinuses are clear.  There is mucosal thickening within the   paranasal sinuses indicating chronic sinus disease.  There are calcifications within the mucosal   thickening in the right  sphenoid sinus which can be seen with chronic fungal sinusitis.  There are   no bony destructive lesions.     IMPRESSION:                 1. No acute intracranial findings.  2. Chronic paranasal sinus disease.  There is suggestion of chronic fungal sinusitis within the   right sphenoid sinus.            JUSTICE HUGO MD         Electronically Signed and Approved By: JUSTICE HUGO MD on 1/19/2022 at 16:59           unable to complete MRI due to bladder stimulator           Subjective      Review of Systems:   Review of Systems   Constitutional: Negative for fever.   HENT: Negative for ear pain and sore throat.    Eyes: Positive for visual disturbance.   Respiratory: Negative for cough.    Cardiovascular: Negative for chest pain.   Neurological: Positive for light-headedness and headaches.        Past Medical History:   Past Medical History:   Diagnosis Date   • Allergic rhinitis due to allergen 01/08/2021   • Ankle pain    • Arthritis    • B12 deficiency 02/22/2019   • Bladder disorder    • Condition not found     Ulcer   • Cramps of lower extremity    • Folic acid deficiency 02/22/2019   • Foot pain, left 03/20/2018   • High cholesterol    • Hypertension    • Kidney problem        Past Surgical History:   Past Surgical History:   Procedure Laterality Date   • CARDIAC CATHETERIZATION N/A 3/3/2022    Procedure: Left Heart Cath - right radial;  Surgeon: CATHERINE Hall MD;  Location: [facility] INVASIVE LOCATION;  Service: Cardiology;  Laterality: N/A;   • HIATAL HERNIA REPAIR  02/2019   • HYSTERECTOMY     • INCONTINENCE SURGERY     • LAPAROSCOPIC CHOLECYSTECTOMY  02/2019   • OTHER SURGICAL HISTORY      Artificial Joints/Limbs   • OTHER SURGICAL HISTORY      Joint surgery   • REVISION / REMOVAL NEUROSTIMULATOR     • TOTAL KNEE ARTHROPLASTY Left        Family History:   Family History   Problem Relation Age of Onset   • Cancer Sister    • Cancer Daughter    • Arthritis Daughter        Social History:   Social History      Socioeconomic History   • Marital status:    Tobacco Use   • Smoking status: Never   • Smokeless tobacco: Never   Vaping Use   • Vaping Use: Never used   Substance and Sexual Activity   • Alcohol use: Yes     Comment: Current some day occasionally drinks, has been drinking for 6-10 years   • Drug use: Never   • Sexual activity: Defer       Medications:     Current Outpatient Medications:   •  acetaminophen (TYLENOL) 325 MG tablet, Take 2 tablets by mouth Every 4 (Four) Hours As Needed for Mild Pain. Pt states not allergic to tylenol, Disp: , Rfl:   •  albuterol sulfate  (90 Base) MCG/ACT inhaler, Take 1-2 Puffs every 4 hours as needed, Disp: 8.5 g, Rfl: 1  •  amiodarone (PACERONE) 200 MG tablet, Take 1 tablet by mouth 2 (Two) Times a Day. Take 1 tablet twice a day for 3 weeks then reduce to 1 tablet once a day after that, Disp: 180 tablet, Rfl: 3  •  ascorbic acid (VITAMIN C) 250 MG tablet, Take 250 mg by mouth Daily., Disp: , Rfl:   •  cetirizine (zyrTEC) 10 MG tablet, Take 1 tablet by mouth every night at bedtime. (Patient taking differently: Take 10 mg by mouth Daily.), Disp: 90 tablet, Rfl: 1  •  Cholecalciferol (Vitamin D3) 50 MCG (2000 UT) capsule, Take 1 capsule by mouth Daily., Disp: 90 capsule, Rfl: 1  •  colestipol (COLESTID) 1 g tablet, Take 1 tablet by mouth 2 (Two) Times a Day. (Patient taking differently: Take 1 g by mouth Daily.), Disp: 180 tablet, Rfl: 1  •  cyanocobalamin 1000 MCG/ML injection, Inject 1 mL into the appropriate muscle as directed by prescriber Every 30 (Thirty) Days., Disp: 1 mL, Rfl: 5  •  Echinacea 400 MG capsule, Take 400 mg by mouth Daily., Disp: , Rfl:   •  eletriptan (RELPAX) 40 MG tablet, Take 40 mg by mouth 1 (One) Time As Needed for Migraine. may repeat in 2 hours if necessary, Disp: , Rfl:   •  fluticasone (FLONASE) 50 MCG/ACT nasal spray, Instill 2 sprays into the nostril(s) as directed by provider Daily., Disp: 16 g, Rfl: 1  •  folic acid (FOLVITE) 1 MG  tablet, Take 1 mg by mouth Daily., Disp: , Rfl:   •  metoprolol succinate XL (TOPROL-XL) 25 MG 24 hr tablet, Take 1 tablet by mouth 2 (Two) Times a Day., Disp: 180 tablet, Rfl: 3  •  montelukast (SINGULAIR) 10 MG tablet, Take 1 tablet by mouth Every Night., Disp: 90 tablet, Rfl: 1  •  Multiple Vitamins-Minerals (b complex-C-E-zinc) tablet, Take 1 tablet by mouth Daily., Disp: , Rfl:   •  sacubitril-valsartan (ENTRESTO) 49-51 MG tablet, Take 1 tablet by mouth 2 (Two) Times a Day., Disp: 180 tablet, Rfl: 3  •  spironolactone (ALDACTONE) 25 MG tablet, Take 1 tablet by mouth Daily., Disp: 90 tablet, Rfl: 3  •  valACYclovir (VALTREX) 500 MG tablet, Take 1 tablet by mouth 2 (Two) Times a Day. (Patient taking differently: Take 500 mg by mouth 2 (Two) Times a Day As Needed.), Disp: 60 tablet, Rfl: 1    Allergies:   Allergies   Allergen Reactions   • Lortab [Hydrocodone-Acetaminophen] Itching     Pt states she takes tylenol at home         Objective     Physical Exam:  Vital Signs:   Vitals:    01/06/23 1021   BP: 127/86   Pulse: 77   Temp: 97.4 °F (36.3 °C)   SpO2: 97%   Weight: 99.3 kg (219 lb)   Height: 157.5 cm (62\")     Body mass index is 40.06 kg/m².     Physical Exam  HENT:      Right Ear: Tympanic membrane normal.      Left Ear: Tympanic membrane normal.      Nose: Nose normal.      Mouth/Throat:      Mouth: Mucous membranes are moist.   Eyes:      Conjunctiva/sclera: Conjunctivae normal.   Neck:      Vascular: No carotid bruit.   Cardiovascular:      Rate and Rhythm: Normal rate and regular rhythm.      Heart sounds: Normal heart sounds. No murmur heard.  Musculoskeletal:      Right lower leg: No edema.      Left lower leg: No edema.   Skin:     General: Skin is warm and dry.   Neurological:      Mental Status: She is alert and oriented to person, place, and time.      Cranial Nerves: No cranial nerve deficit.             Assessment / Plan      Assessment/Plan:   Diagnoses and all orders for this visit:    1.  Tachycardia (Primary)    2. Stabbing headache  -     CT Head Without Contrast; Future  -     Duplex Carotid - Right Ultrasound CAR; Future  -     Duplex Carotid - Left Ultrasound CAR; Future    3. Primary hypertension  -     CT Head Without Contrast; Future  -     Duplex Carotid - Right Ultrasound CAR; Future  -     Duplex Carotid - Left Ultrasound CAR; Future    4. Vision changes  -     Duplex Carotid - Right Ultrasound CAR; Future  -     Duplex Carotid - Left Ultrasound CAR; Future    5. Lightheaded  -     Duplex Carotid - Right Ultrasound CAR; Future  -     Duplex Carotid - Left Ultrasound CAR; Future       Tachycardia resolved once resuming amiodarone per cardiology recommendations keep follow-up with cardiology as scheduled  Stabbing headache history of hypertension with vision changes will obtain CT of the head and carotid Doppler we will call with results and further recommendations        Follow Up:   Return in about 4 months (around 5/6/2023).    DORCAS Ulrich      Please note that portions of this note were completed with a voice recognition program.

## 2023-01-17 RX ORDER — TOPIRAMATE 50 MG/1
50 TABLET, FILM COATED ORAL 2 TIMES DAILY
Qty: 90 TABLET | Refills: 1 | Status: SHIPPED | OUTPATIENT
Start: 2023-01-17

## 2023-01-17 RX ORDER — ACETAMINOPHEN 160 MG
2000 TABLET,DISINTEGRATING ORAL DAILY
Qty: 90 CAPSULE | Refills: 1 | Status: SHIPPED | OUTPATIENT
Start: 2023-01-17

## 2023-02-01 ENCOUNTER — HOSPITAL ENCOUNTER (OUTPATIENT)
Dept: CT IMAGING | Facility: HOSPITAL | Age: 58
Discharge: HOME | End: 2023-02-01
Payer: COMMERCIAL

## 2023-02-01 ENCOUNTER — HOSPITAL ENCOUNTER (OUTPATIENT)
Dept: CARDIOLOGY | Facility: HOSPITAL | Age: 58
Discharge: HOME | End: 2023-02-01
Payer: COMMERCIAL

## 2023-02-01 DIAGNOSIS — G44.85 STABBING HEADACHE: ICD-10-CM

## 2023-02-01 DIAGNOSIS — I10 PRIMARY HYPERTENSION: ICD-10-CM

## 2023-02-01 DIAGNOSIS — H53.9 VISION CHANGES: ICD-10-CM

## 2023-02-01 DIAGNOSIS — R42 LIGHTHEADED: ICD-10-CM

## 2023-02-01 LAB
BH CV XLRA MEAS LEFT CAROTID BULB EDV: 26 CM/SEC
BH CV XLRA MEAS LEFT CAROTID BULB PSV: 64 CM/SEC
BH CV XLRA MEAS LEFT DIST CCA EDV: 29 CM/SEC
BH CV XLRA MEAS LEFT DIST CCA PSV: 88 CM/SEC
BH CV XLRA MEAS LEFT DIST ICA EDV: 32 CM/SEC
BH CV XLRA MEAS LEFT DIST ICA PSV: 72 CM/SEC
BH CV XLRA MEAS LEFT MID ICA EDV: 36 CM/SEC
BH CV XLRA MEAS LEFT MID ICA PSV: 81 CM/SEC
BH CV XLRA MEAS LEFT PROX CCA EDV: 30 CM/SEC
BH CV XLRA MEAS LEFT PROX CCA PSV: 125 CM/SEC
BH CV XLRA MEAS LEFT PROX ECA EDV: 20 CM/SEC
BH CV XLRA MEAS LEFT PROX ECA PSV: 90 CM/SEC
BH CV XLRA MEAS LEFT PROX ICA EDV: 27 CM/SEC
BH CV XLRA MEAS LEFT PROX ICA PSV: 81 CM/SEC
BH CV XLRA MEAS LEFT VERTEBRAL A EDV: 18 CM/SEC
BH CV XLRA MEAS LEFT VERTEBRAL A PSV: 49 CM/SEC
BH CV XLRA MEAS RIGHT CAROTID BULB EDV: 21 CM/SEC
BH CV XLRA MEAS RIGHT CAROTID BULB PSV: 69 CM/SEC
BH CV XLRA MEAS RIGHT DIST CCA EDV: 30 CM/SEC
BH CV XLRA MEAS RIGHT DIST CCA PSV: 82 CM/SEC
BH CV XLRA MEAS RIGHT DIST ICA EDV: 36 CM/SEC
BH CV XLRA MEAS RIGHT DIST ICA PSV: 81 CM/SEC
BH CV XLRA MEAS RIGHT MID ICA EDV: 31 CM/SEC
BH CV XLRA MEAS RIGHT MID ICA PSV: 83 CM/SEC
BH CV XLRA MEAS RIGHT PROX CCA EDV: 28 CM/SEC
BH CV XLRA MEAS RIGHT PROX CCA PSV: 106 CM/SEC
BH CV XLRA MEAS RIGHT PROX ECA EDV: 18 CM/SEC
BH CV XLRA MEAS RIGHT PROX ECA PSV: 98 CM/SEC
BH CV XLRA MEAS RIGHT PROX ICA EDV: 29 CM/SEC
BH CV XLRA MEAS RIGHT PROX ICA PSV: 81 CM/SEC
BH CV XLRA MEAS RIGHT VERTEBRAL A EDV: 19 CM/SEC
BH CV XLRA MEAS RIGHT VERTEBRAL A PSV: 49 CM/SEC
LEFT ARM BP: NORMAL MMHG
MAXIMAL PREDICTED HEART RATE: 162 BPM
RIGHT ARM BP: NORMAL MMHG
STRESS TARGET HR: 138 BPM

## 2023-02-01 PROCEDURE — 70450 CT HEAD/BRAIN W/O DYE: CPT

## 2023-02-01 PROCEDURE — 93880 EXTRACRANIAL BILAT STUDY: CPT | Performed by: SPECIALIST

## 2023-02-01 PROCEDURE — 93880 EXTRACRANIAL BILAT STUDY: CPT

## 2023-02-06 ENCOUNTER — HOSPITAL ENCOUNTER (OUTPATIENT)
Dept: MAMMOGRAPHY | Facility: HOSPITAL | Age: 58
Discharge: HOME | End: 2023-02-06
Admitting: NURSE PRACTITIONER
Payer: COMMERCIAL

## 2023-02-06 DIAGNOSIS — Z12.31 SCREENING MAMMOGRAM FOR BREAST CANCER: ICD-10-CM

## 2023-02-06 PROCEDURE — 77067 SCR MAMMO BI INCL CAD: CPT

## 2023-02-06 PROCEDURE — 77063 BREAST TOMOSYNTHESIS BI: CPT

## 2023-02-21 ENCOUNTER — TELEPHONE (OUTPATIENT)
Dept: FAMILY MEDICINE CLINIC | Facility: CLINIC | Age: 58
End: 2023-02-21
Payer: COMMERCIAL

## 2023-02-21 NOTE — TELEPHONE ENCOUNTER
Caller: Pam Cade    Relationship: Self    Best call back number: [phone]    What is the best time to reach you: ANY    Who are you requesting to speak with (clinical staff, provider,  specific staff member): CLINICAL STAFF    What was the call regarding: PATIENT WOULD LIKE TO GET A HANDICAP TAG FOR HER VEHICLE AND WOULD LIKE TO KNOW HOW TO START THE PROCESS.    Do you require a callback: YES

## 2023-03-31 RX ORDER — FOLIC ACID 1 MG/1
1 TABLET ORAL DAILY
Qty: 90 TABLET | Refills: 1 | Status: SHIPPED | OUTPATIENT
Start: 2023-03-31

## 2023-06-29 PROBLEM — R31.9 HEMATURIA: Status: ACTIVE | Noted: 2023-06-29

## 2023-06-30 PROBLEM — R30.0 DYSURIA: Status: ACTIVE | Noted: 2023-06-30

## 2023-06-30 PROBLEM — H69.93 EUSTACHIAN TUBE DISORDER, BILATERAL: Status: ACTIVE | Noted: 2023-06-30

## 2023-06-30 PROBLEM — B00.9 HERPES: Status: ACTIVE | Noted: 2023-06-30

## 2023-06-30 PROBLEM — N30.01 ACUTE CYSTITIS WITH HEMATURIA: Status: ACTIVE | Noted: 2023-06-30

## 2023-06-30 PROBLEM — B00.1 RECURRENT COLD SORES: Status: ACTIVE | Noted: 2023-06-30

## 2023-07-21 PROBLEM — R73.01 IMPAIRED FASTING GLUCOSE: Status: ACTIVE | Noted: 2023-07-21

## 2023-07-21 PROBLEM — R09.81 CONGESTION OF NASAL SINUS: Status: ACTIVE | Noted: 2023-07-21

## 2023-07-21 PROBLEM — G89.29 CHRONIC HEADACHES: Status: ACTIVE | Noted: 2022-01-05

## 2023-07-21 PROBLEM — E66.01 CLASS 3 SEVERE OBESITY DUE TO EXCESS CALORIES WITH SERIOUS COMORBIDITY AND BODY MASS INDEX (BMI) OF 40.0 TO 44.9 IN ADULT: Status: ACTIVE | Noted: 2023-07-21

## 2023-07-21 PROBLEM — E66.813 CLASS 3 SEVERE OBESITY DUE TO EXCESS CALORIES WITH SERIOUS COMORBIDITY AND BODY MASS INDEX (BMI) OF 40.0 TO 44.9 IN ADULT: Status: ACTIVE | Noted: 2023-07-21

## 2023-07-25 DIAGNOSIS — J30.2 SEASONAL ALLERGIC RHINITIS, UNSPECIFIED TRIGGER: Primary | ICD-10-CM

## 2023-08-08 ENCOUNTER — TELEPHONE (OUTPATIENT)
Dept: FAMILY MEDICINE CLINIC | Facility: CLINIC | Age: 58
End: 2023-08-08
Payer: COMMERCIAL

## 2023-08-08 RX ORDER — CYANOCOBALAMIN 1000 UG/ML
1000 INJECTION, SOLUTION INTRAMUSCULAR; SUBCUTANEOUS
Qty: 3 ML | Refills: 3 | Status: SHIPPED | OUTPATIENT
Start: 2023-08-08 | End: 2024-07-09

## 2023-08-08 NOTE — TELEPHONE ENCOUNTER
Caller: Rayo Pam CELESTINE    Relationship: Self    Best call back number: [phone]     Requested Prescriptions:   B12 INJECTION       Pharmacy where request should be sent: [facility] [place]     Last office visit with prescribing clinician: 7/21/2023   Last telemedicine visit with prescribing clinician: Visit date not found   Next office visit with prescribing clinician: 1/12/2024     Does the patient have less than a 3 day supply:  [x] Yes  [] No    Would you like a call back once the refill request has been completed: [] Yes [x] No    If the office needs to give you a call back, can they leave a voicemail: [] Yes [x] No    Aminta Haider Rep   08/08/23 09:31 EDT

## 2023-08-10 RX ORDER — MONTELUKAST SODIUM 4 MG/1
1 TABLET, CHEWABLE ORAL 2 TIMES DAILY
Qty: 180 TABLET | Refills: 1 | Status: SHIPPED | OUTPATIENT
Start: 2023-08-10

## 2023-08-17 ENCOUNTER — TELEPHONE (OUTPATIENT)
Dept: FAMILY MEDICINE CLINIC | Facility: CLINIC | Age: 58
End: 2023-08-17
Payer: COMMERCIAL

## 2023-08-17 RX ORDER — SULFAMETHOXAZOLE AND TRIMETHOPRIM 800; 160 MG/1; MG/1
1 TABLET ORAL 2 TIMES DAILY
Qty: 20 TABLET | Refills: 0 | Status: SHIPPED | OUTPATIENT
Start: 2023-08-17

## 2023-08-17 NOTE — TELEPHONE ENCOUNTER
Caller: Pam Cade    Relationship: Self    Best call back number: [phone]     What medication are you requesting: SOMETHING FOR UTI    What are your current symptoms: BURNING, PRESSURE, AND PAIN    How long have you been experiencing symptoms: 1 DAY    If a prescription is needed, what is your preferred pharmacy and phone number: [facility] [place]     Additional notes:    PATIENT STATES SHE IS ON AN ANTIBIOTIC AND BELIEVES IT CAUSED HER UTI.

## 2023-09-05 RX ORDER — SEMAGLUTIDE 0.25 MG/.5ML
0.25 INJECTION, SOLUTION SUBCUTANEOUS WEEKLY
Qty: 2 ML | Refills: 0 | Status: SHIPPED | OUTPATIENT
Start: 2023-09-05

## 2023-09-08 ENCOUNTER — TELEPHONE (OUTPATIENT)
Dept: FAMILY MEDICINE CLINIC | Facility: CLINIC | Age: 58
End: 2023-09-08
Payer: COMMERCIAL

## 2023-09-08 NOTE — TELEPHONE ENCOUNTER
Caller: Pam Cade    Relationship: Self    Best call back number: [phone]     What is the best time to reach you: ANY    Who are you requesting to speak with (clinical staff, provider,  specific staff member): CLINICAL STAFF    What was the call regarding: PATIENT WOULD LIKE TO KNOW WHAT SHE SHOULD DO SINCE SHE IS DUE FOR HER WEGOVY SHOT BUT MOST PHARMACIES ARE CURRENTLY OUT OF STOCK

## 2023-09-19 NOTE — TELEPHONE ENCOUNTER
Patient called asking if there is anything else she can take instead of Wegovy since its out of stock.

## 2023-09-20 NOTE — TELEPHONE ENCOUNTER
Caller: Pam Cade    Relationship: Self    Best call back number: [phone]    What was the call regarding: PATIENT STATES SHE WOULD LIKE A CALL REGARDING THIS MEDICATION AND HER REQUEST FOR A DIFFERENT MEDICATION.

## 2023-09-21 ENCOUNTER — PATIENT MESSAGE (OUTPATIENT)
Dept: FAMILY MEDICINE CLINIC | Facility: CLINIC | Age: 58
End: 2023-09-21
Payer: COMMERCIAL

## 2023-09-21 ENCOUNTER — TELEPHONE (OUTPATIENT)
Dept: FAMILY MEDICINE CLINIC | Facility: CLINIC | Age: 58
End: 2023-09-21
Payer: COMMERCIAL

## 2023-09-21 RX ORDER — NALTREXONE HYDROCHLORIDE AND BUPROPION HYDROCHLORIDE 8; 90 MG/1; MG/1
2 TABLET, EXTENDED RELEASE ORAL 2 TIMES DAILY
Qty: 120 TABLET | Refills: 5 | Status: SHIPPED | OUTPATIENT
Start: 2023-09-21

## 2023-09-21 NOTE — TELEPHONE ENCOUNTER
Patient was advised to call to see if any pharmacy has wegovy. She has called many places with no Wegovy being found. She is asking if you can prescribe something else for weight loss.  Patient also states she has called us 3 times with no call back.   PLEASE ADVISE

## 2023-10-05 ENCOUNTER — TELEPHONE (OUTPATIENT)
Dept: FAMILY MEDICINE CLINIC | Facility: CLINIC | Age: 58
End: 2023-10-05

## 2023-10-05 RX ORDER — BENZONATATE 200 MG/1
200 CAPSULE ORAL 3 TIMES DAILY PRN
Qty: 90 CAPSULE | Refills: 1 | Status: SHIPPED | OUTPATIENT
Start: 2023-10-05 | End: 2023-10-05 | Stop reason: SDUPTHER

## 2023-10-05 RX ORDER — BENZONATATE 200 MG/1
200 CAPSULE ORAL 3 TIMES DAILY PRN
Qty: 90 CAPSULE | Refills: 1 | Status: SHIPPED | OUTPATIENT
Start: 2023-10-05

## 2023-10-05 NOTE — TELEPHONE ENCOUNTER
Caller: Rayo Pam CELESTINE    Relationship: Self    Best call back number:     [phone]       What medication are you requesting: COUGH MEDICATION    What are your current symptoms: COUGHING TO THE POINT GETTING HEADACHES    How long have you been experiencing symptoms: OVER A WEEK    Have you had these symptoms before:    [x] Yes  [] No    Have you been treated for these symptoms before:   [x] Yes  [] No    If a prescription is needed, what is your preferred pharmacy and phone number: [facility] - [place]     Additional notes: PATIENT STATES THE COUGH SYRUP THAT WAS PRESCRIBED IS ON BACK ORDER AND PATIENT WAS NOT ABLE TO GET FILLED. PATIENT STATES WHILE HER OTHER SYMPTOMS HAVE SUBSIDED THE COUGH HAS PROGRESSIVELY GOTTEN WORSE AND IS NEED OF A MEDICATION TO BE PRESCRIBED.

## 2023-10-09 ENCOUNTER — PATIENT OUTREACH (OUTPATIENT)
Dept: CASE MANAGEMENT | Facility: OTHER | Age: 58
End: 2023-10-09
Payer: COMMERCIAL

## 2023-10-09 NOTE — OUTREACH NOTE
Adult Patient Profile  Questions/Answers      Flowsheet Row Most Recent Value   Symptoms/Conditions Managed at Home diabetes, type 2   Barriers to Managing Health none   Diabetes Management Strategies weight management   Diabetes Self-Management Outcome 3 (uncertain)   How to be Addressed Matilde Stanley   How Well Do You Speak English? very well   Source of Information patient        Adult Wellness Assessment  Questions/Answers      Flowsheet Row Most Recent Value   How often do you have someone help you read facts about your health? never   How often do you have trouble learning about your health because you don't know what the written words mean? never   How confident are you filling out forms by yourself? always        Send Education  Questions/Answers      Flowsheet Row Most Recent Value   Other Patient Education/Resources  24/7 [facility] Nurse Call Line   24/7 Nurse Call Line Education Method Send Materials        AMBULATORY CASE MANAGEMENT NOTE    Name and Relationship of Patient/Support Person: Matilde Stanley - Self        Education Documentation  Diagnosis Criteria, taught by Anastasiya Berger, RN at 10/9/2023  3:36 PM.  Learner: Patient  Readiness: Acceptance  Method: Explanation  Response: Verbalizes Understanding    Diabetes, Type 2, taught by Anastasiya Berger, RN at 10/9/2023  3:36 PM.  Learner: Patient  Readiness: Acceptance  Method: Explanation  Response: Verbalizes Understanding      Patient Outreach  Successful outreach to pt. With discussion of pre diabetes. Pt. Agreed to case management for additional support. Pt.. aware diagnosis and is currently working with medical team in management.  Education provided for DM management of pre diabetes. No SDOH deficits noted at this time. Provided pt. With [facility] for DM management. CM provided all contact information.       ANASTASIYA SPRAGUE  Ambulatory Case Management    10/9/2023, 15:41 EDT

## 2023-10-24 RX ORDER — MONTELUKAST SODIUM 10 MG/1
10 TABLET ORAL NIGHTLY
Qty: 90 TABLET | Refills: 1 | Status: SHIPPED | OUTPATIENT
Start: 2023-10-24

## 2024-04-01 RX ORDER — AMIODARONE HYDROCHLORIDE 200 MG/1
200 TABLET ORAL 2 TIMES DAILY
Qty: 180 TABLET | Refills: 3 | Status: SHIPPED | OUTPATIENT
Start: 2024-04-01

## 2024-04-01 RX ORDER — SACUBITRIL AND VALSARTAN 49; 51 MG/1; MG/1
1 TABLET, FILM COATED ORAL 2 TIMES DAILY
Qty: 180 TABLET | Refills: 3 | Status: CANCELLED | OUTPATIENT
Start: 2024-04-01

## 2024-04-02 ENCOUNTER — TELEPHONE (OUTPATIENT)
Dept: FAMILY MEDICINE CLINIC | Facility: CLINIC | Age: 59
End: 2024-04-02

## 2024-04-02 ENCOUNTER — TELEPHONE (OUTPATIENT)
Dept: CARDIOLOGY | Facility: CLINIC | Age: 59
End: 2024-04-02
Payer: COMMERCIAL

## 2024-04-02 NOTE — TELEPHONE ENCOUNTER
Caller: Pam Cade    Relationship: Self    Best call back number: [phone]     Requested Prescriptions:   Requested Prescriptions     Pending Prescriptions Disp Refills    sacubitril-valsartan (ENTRESTO) 49-51 MG tablet 180 tablet 3     Sig: Take 1 tablet by mouth 2 (Two) Times a Day.        Pharmacy where request should be sent: [facility]     Last office visit with prescribing clinician: 7/21/2023   Last telemedicine visit with prescribing clinician: Visit date not found   Next office visit with prescribing clinician: Visit date not found       Does the patient have less than a 3 day supply:  [x] Yes  [] No    Would you like a call back once the refill request has been completed: [] Yes [x] No    If the office needs to give you a call back, can they leave a voicemail: [] Yes [x] No    Fatemeh Gregorio, PCT   04/02/24 09:42 EDT

## 2024-04-02 NOTE — TELEPHONE ENCOUNTER
Caller: Loreta Gamino    Relationship: Self    Best call back number: [phone] ASK FOR LORETA GAMINO     What is the best time to reach you: ANYTIME     Who are you requesting to speak with (clinical staff, provider,  specific staff member): CLINICAL         What was the call regarding: PATIENT IS CALLING TO CHECK WHY THE ENTRESTO REFILL WAS DENIED.     Is it okay if the provider responds through MyChart: YES

## 2024-04-02 NOTE — TELEPHONE ENCOUNTER
Called patient and left voicemail that medication is prescribed by cardiology and she needs to contact them.  Also requested patient to call back for a follow up appointment since she has not been seen since July 2023.

## 2024-04-02 NOTE — TELEPHONE ENCOUNTER
Spoke to patient and patient acknowledge One month RX was sent to the pharmacy until her next follow up appt.  More refills will be added at [facility]

## 2024-04-22 ENCOUNTER — OFFICE VISIT (OUTPATIENT)
Dept: CARDIOLOGY | Facility: CLINIC | Age: 59
End: 2024-04-22
Payer: COMMERCIAL

## 2024-04-22 VITALS
SYSTOLIC BLOOD PRESSURE: 134 MMHG | DIASTOLIC BLOOD PRESSURE: 85 MMHG | BODY MASS INDEX: 40.67 KG/M2 | WEIGHT: 221 LBS | HEART RATE: 78 BPM | HEIGHT: 62 IN

## 2024-04-22 DIAGNOSIS — I42.8 NICM (NONISCHEMIC CARDIOMYOPATHY): ICD-10-CM

## 2024-04-22 DIAGNOSIS — R00.2 PALPITATIONS: ICD-10-CM

## 2024-04-22 DIAGNOSIS — R06.09 DYSPNEA ON EXERTION: Primary | ICD-10-CM

## 2024-04-22 DIAGNOSIS — I47.19 PAROXYSMAL ATRIAL TACHYCARDIA: ICD-10-CM

## 2024-04-22 PROCEDURE — 99214 OFFICE O/P EST MOD 30 MIN: CPT | Performed by: INTERNAL MEDICINE

## 2024-04-22 NOTE — PROGRESS NOTES
Chief Complaint  Cardiomyopathy, Hypertension, LBBB, and Rapid Heart Rate    Subjective            Pam Schmitt presents to [facility] CARDIOLOGY  Cardiomyopathy  Associated symptoms include chest pain.   Hypertension  Associated symptoms include chest pain and shortness of breath.       Ms. schmitt is here for follow-up evaluation management of nonischemic cardiomyopathy with moderate LV dysfunction, paroxysmal atrial tachycardia on amiodarone therapy, essential hypertension.  She has not been seen since 2022.  She has had some occasions of chest tightness and shortness of breath radiating up into her neck intermittently a couple times per week.  It seems to be better with rest and drinking water.  She is compliant with her medical therapy.  No significant weight gain or increased fluid retention.    [facility]  Past Medical History:   Diagnosis Date    Allergic rhinitis due to allergen 01/08/2021    Ankle pain     Arthritis     B12 deficiency 02/22/2019    Bladder disorder     Condition not found     Ulcer    Cramps of lower extremity     Folic acid deficiency 02/22/2019    Foot pain, left 03/20/2018    High cholesterol     Hypertension     Kidney problem          SURGICALHX  Past Surgical History:   Procedure Laterality Date    CARDIAC CATHETERIZATION N/A 3/3/2022    Procedure: Left Heart Cath - right radial;  Surgeon: CATHERINE Hall MD;  Location: [facility] INVASIVE LOCATION;  Service: Cardiology;  Laterality: N/A;    HIATAL HERNIA REPAIR  02/2019    HYSTERECTOMY      INCONTINENCE SURGERY      LAPAROSCOPIC CHOLECYSTECTOMY  02/2019    OTHER SURGICAL HISTORY      Artificial Joints/Limbs    OTHER SURGICAL HISTORY      Joint surgery    REVISION / REMOVAL NEUROSTIMULATOR      TOTAL KNEE ARTHROPLASTY Left         SOC  Social History     Socioeconomic History    Marital status:    Tobacco Use    Smoking status: Never    Smokeless tobacco: Never   Vaping Use    Vaping status: Never Used   Substance and  Sexual Activity    Alcohol use: Yes     Comment: Current some day occasionally drinks, has been drinking for 6-10 years    Drug use: Never    Sexual activity: Defer         FAMHX  Family History   Problem Relation Age of Onset    Cancer Sister     Cancer Daughter     Arthritis Daughter           ALLERGY  Allergies   Allergen Reactions    Lortab [Hydrocodone-Acetaminophen] Itching     Pt states she takes tylenol at home        MEDSCURRENT    Current Outpatient Medications:     acetaminophen (TYLENOL) 325 MG tablet, Take 2 tablets by mouth Every 4 (Four) Hours As Needed for Mild Pain. Pt states not allergic to tylenol, Disp: , Rfl:     albuterol sulfate  (90 Base) MCG/ACT inhaler, Take 1-2 Puffs every 4 hours as needed, Disp: 8.5 g, Rfl: 1    amiodarone (PACERONE) 200 MG tablet, Take 1 tablet twice a day for 3 weeks then reduce to 1 tablet once a day after that, Disp: 180 tablet, Rfl: 3    ascorbic acid (VITAMIN C) 250 MG tablet, Take 1 tablet by mouth Daily., Disp: , Rfl:     azelastine (ASTELIN) 0.1 % nasal spray, 2 sprays into the nostril(s) as directed by provider 2 (Two) Times a Day. Use in each nostril as directed, Disp: 30 mL, Rfl: 12    cetirizine (zyrTEC) 10 MG tablet, Take 1 tablet by mouth every night at bedtime. (Patient taking differently: Take 1 tablet by mouth Daily.), Disp: 90 tablet, Rfl: 1    Cholecalciferol (Vitamin D3) 50 MCG (2000 UT) capsule, Take 1 capsule by mouth Daily., Disp: 90 capsule, Rfl: 1    colestipol (COLESTID) 1 g tablet, Take 1 tablet by mouth 2 (Two) Times a Day. (Patient taking differently: Take 1 tablet by mouth Daily.), Disp: 180 tablet, Rfl: 1    Echinacea 400 MG capsule, Take 400 mg by mouth Daily., Disp: , Rfl:     eletriptan (RELPAX) 40 MG tablet, Take 1 tablet by mouth 1 (One) Time As Needed for Migraine. may repeat in 2 hours if necessary, Disp: , Rfl:     fluticasone (FLONASE) 50 MCG/ACT nasal spray, Instill 2 sprays into the nostril(s) as directed by provider  Daily., Disp: 16 g, Rfl: 1    folic acid (FOLVITE) 1 MG tablet, Take 1 tablet by mouth Daily., Disp: 90 tablet, Rfl: 1    metoprolol succinate XL (TOPROL-XL) 25 MG 24 hr tablet, Take 1 tablet by mouth 2 (Two) Times a Day., Disp: 180 tablet, Rfl: 3    montelukast (SINGULAIR) 10 MG tablet, Take 1 tablet by mouth Every Night., Disp: 90 tablet, Rfl: 1    Multiple Vitamins-Minerals (b complex-C-E-zinc) tablet, Take 1 tablet by mouth Daily., Disp: , Rfl:     sacubitril-valsartan (ENTRESTO) 49-51 MG tablet, Take 1 tablet by mouth 2 (Two) Times a Day., Disp: 60 tablet, Rfl: 0    sacubitril-valsartan (ENTRESTO) 49-51 MG tablet, Take 1 tablet by mouth 2 (Two) Times a Day., Disp: 180 tablet, Rfl: 3    spironolactone (ALDACTONE) 25 MG tablet, Take 1 tablet by mouth Daily., Disp: 90 tablet, Rfl: 3    topiramate (TOPAMAX) 50 MG tablet, Take 1 tablet by mouth 2 (Two) Times a Day., Disp: 90 tablet, Rfl: 1    valACYclovir (VALTREX) 500 MG tablet, Take 1 tablet by mouth Daily., Disp: 90 tablet, Rfl: 1    amoxicillin (AMOXIL) 500 MG tablet, Take 4 tabs 1 hour prior to dental procedure (Patient not taking: Reported on 4/22/2024), Disp: 8 tablet, Rfl: 1    benzonatate (TESSALON) 200 MG capsule, Take 1 capsule by mouth 3 (Three) Times a Day As Needed for Cough. (Patient not taking: Reported on 4/22/2024), Disp: 90 capsule, Rfl: 1    cyanocobalamin 1000 MCG/ML injection, Inject 1 mL into the appropriate muscle as directed by prescriber Every 28 (Twenty-Eight) Days for 336 days. (Patient not taking: Reported on 4/22/2024), Disp: 3 mL, Rfl: 3    methylPREDNISolone (MEDROL) 4 MG dose pack, Take as directed on package instructions. (Patient not taking: Reported on 4/22/2024), Disp: 21 tablet, Rfl: 0    naltrexone-bupropion ER (Contrave) 8-90 MG tablet, Take 2 tablets by mouth 2 (Two) Times a Day. (Patient not taking: Reported on 4/22/2024), Disp: 120 tablet, Rfl: 5    sulfamethoxazole-trimethoprim (Bactrim DS) 800-160 MG per tablet, Take 1  "tablet by mouth 2 (Two) Times a Day. (Patient not taking: Reported on 4/22/2024), Disp: 20 tablet, Rfl: 0    Current Facility-Administered Medications:     methylPREDNISolone acetate (DEPO-medrol) injection 80 mg, 80 mg, Intramuscular, Once, Ally Barnard APRN      Review of Systems   Constitutional: Negative.   HENT: Negative.     Eyes: Negative.    Cardiovascular:  Positive for chest pain and dyspnea on exertion.   Respiratory:  Positive for shortness of breath.    Endocrine: Negative.    Hematologic/Lymphatic: Negative.    Skin: Negative.    Musculoskeletal: Negative.    Gastrointestinal: Negative.    Genitourinary: Negative.    Neurological: Negative.    Psychiatric/Behavioral: Negative.          Objective     /85   Pulse 78   Ht 157.5 cm (62\")   Wt 100 kg (221 lb)   BMI 40.42 kg/m²       General Appearance:   well developed  well nourished  HENT:   oropharynx moist  lips not cyanotic  Neck:  thyroid not enlarged  supple  Respiratory:  no respiratory distress  normal breath sounds  no rales  Cardiovascular:  no jugular venous distention  regular rhythm  apical impulse normal  S1 normal, S2 normal  no S3, no S4   no murmur  no rub, no thrill  carotid pulses normal; no bruit  pedal pulses normal  lower extremity edema: none    Musculoskeletal:  no clubbing of fingers.   normocephalic, head atraumatic  Skin:   warm, dry  Psychiatric:  judgement and insight appropriate  normal mood and affect      Result Review :     The following data was reviewed by: Juan Hall MD on 04/22/2024:    CMP          7/21/2023    09:57   CMP   Glucose 85    BUN 14    Creatinine 0.75    EGFR 92.4    Sodium 143    Potassium 4.2    Chloride 107    Calcium 9.4    Total Protein 7.0    Albumin 4.6    Globulin 2.4    Total Bilirubin 0.4    Alkaline Phosphatase 86    AST (SGOT) 18    ALT (SGPT) 16    Albumin/Globulin Ratio 1.9    BUN/Creatinine Ratio 18.7    Anion Gap 11.0        Lipid Panel          " 7/21/2023    09:57   Lipid Panel   Total Cholesterol 236    Triglycerides 64    HDL Cholesterol 82    VLDL Cholesterol 11    LDL Cholesterol  143    LDL/HDL Ratio 1.72      TSH          7/21/2023    09:57   TSH   TSH 2.060        Data reviewed : Primary care records reviewed      Procedures           Assessment and Plan        ASSESSMENT:  Encounter Diagnoses   Name Primary?    Dyspnea on exertion Yes    NICM (nonischemic cardiomyopathy)     Palpitations     Paroxysmal atrial tachycardia          PLAN:    1.  Dyspnea on exertion with chest discomfort-it is unclear if this is related to LV dysfunction, paroxysmal tachycardia or other cause.  A 10-day Holter monitor will be scheduled as well as an echocardiogram.  She had normal coronary angiography in 2022's so CAD is very unlikely.  2.  Nonischemic cardiomyopathy-she is more short of breath.  Continue guideline directed medical therapy as listed above, refill Entresto.  Repeat echo will be scheduled  3.  Palpitations with paroxysmal atrial tachycardia-Holter monitor pending, continue amiodarone and low-dose beta-blocker for now    We will discuss the diagnostic results and plan for follow-up thereafter          Patient was given instructions and counseling regarding her condition or for health maintenance advice. Please see specific information pulled into the AVS if appropriate.             CATHERINE Hall MD  4/22/2024    13:03 EDT

## 2024-04-26 RX ORDER — SPIRONOLACTONE 25 MG/1
25 TABLET ORAL DAILY
Qty: 90 TABLET | Refills: 3 | Status: SHIPPED | OUTPATIENT
Start: 2024-04-26

## 2024-05-06 ENCOUNTER — TELEPHONE (OUTPATIENT)
Dept: CARDIOLOGY | Facility: CLINIC | Age: 59
End: 2024-05-06
Payer: COMMERCIAL

## 2024-05-21 ENCOUNTER — HOSPITAL ENCOUNTER (OUTPATIENT)
Dept: CARDIOLOGY | Facility: HOSPITAL | Age: 59
Discharge: HOME | End: 2024-05-21
Admitting: INTERNAL MEDICINE
Payer: COMMERCIAL

## 2024-05-21 DIAGNOSIS — R06.09 DYSPNEA ON EXERTION: ICD-10-CM

## 2024-05-21 PROCEDURE — 93306 TTE W/DOPPLER COMPLETE: CPT

## 2024-05-22 ENCOUNTER — TELEPHONE (OUTPATIENT)
Dept: CARDIOLOGY | Facility: CLINIC | Age: 59
End: 2024-05-22
Payer: COMMERCIAL

## 2024-05-22 LAB
AORTIC DIMENSIONLESS INDEX: 0.45 (DI)
BH CV ECHO MEAS - AI P1/2T: 526.7 MSEC
BH CV ECHO MEAS - AO MAX PG: 10.2 MMHG
BH CV ECHO MEAS - AO MEAN PG: 6 MMHG
BH CV ECHO MEAS - AO ROOT DIAM: 2.6 CM
BH CV ECHO MEAS - AO V2 MAX: 160 CM/SEC
BH CV ECHO MEAS - AO V2 VTI: 35.8 CM
BH CV ECHO MEAS - AVA(I,D): 1.7 CM2
BH CV ECHO MEAS - EDV(CUBED): 227 ML
BH CV ECHO MEAS - EDV(MOD-SP2): 139 ML
BH CV ECHO MEAS - EDV(MOD-SP4): 133 ML
BH CV ECHO MEAS - EF(MOD-BP): 31.5 %
BH CV ECHO MEAS - EF(MOD-SP2): 30.6 %
BH CV ECHO MEAS - EF(MOD-SP4): 33 %
BH CV ECHO MEAS - ESV(CUBED): 140.6 ML
BH CV ECHO MEAS - ESV(MOD-SP2): 96.5 ML
BH CV ECHO MEAS - ESV(MOD-SP4): 89.1 ML
BH CV ECHO MEAS - FS: 14.8 %
BH CV ECHO MEAS - IVS/LVPW: 1 CM
BH CV ECHO MEAS - IVSD: 1 CM
BH CV ECHO MEAS - LA DIMENSION: 4.7 CM
BH CV ECHO MEAS - LAT PEAK E' VEL: 12.8 CM/SEC
BH CV ECHO MEAS - LV DIASTOLIC VOL/BSA (35-75): 66.7 CM2
BH CV ECHO MEAS - LV MASS(C)D: 253.9 GRAMS
BH CV ECHO MEAS - LV MAX PG: 1.89 MMHG
BH CV ECHO MEAS - LV MEAN PG: 1 MMHG
BH CV ECHO MEAS - LV SYSTOLIC VOL/BSA (12-30): 44.7 CM2
BH CV ECHO MEAS - LV V1 MAX: 68.7 CM/SEC
BH CV ECHO MEAS - LV V1 VTI: 16 CM
BH CV ECHO MEAS - LVIDD: 6.1 CM
BH CV ECHO MEAS - LVIDS: 5.2 CM
BH CV ECHO MEAS - LVOT AREA: 3.8 CM2
BH CV ECHO MEAS - LVOT DIAM: 2.2 CM
BH CV ECHO MEAS - LVPWD: 1 CM
BH CV ECHO MEAS - MED PEAK E' VEL: 7 CM/SEC
BH CV ECHO MEAS - MV A MAX VEL: 53.1 CM/SEC
BH CV ECHO MEAS - MV DEC SLOPE: 866 CM/SEC2
BH CV ECHO MEAS - MV DEC TIME: 0.2 SEC
BH CV ECHO MEAS - MV E MAX VEL: 75.8 CM/SEC
BH CV ECHO MEAS - MV E/A: 1.43
BH CV ECHO MEAS - MV MEAN PG: 1 MMHG
BH CV ECHO MEAS - MV P1/2T: 41.3 MSEC
BH CV ECHO MEAS - MV V2 VTI: 25.9 CM
BH CV ECHO MEAS - MVA(P1/2T): 5.3 CM2
BH CV ECHO MEAS - MVA(VTI): 2.35 CM2
BH CV ECHO MEAS - PA V2 MAX: 93 CM/SEC
BH CV ECHO MEAS - PULM A REVS DUR: 0.14 SEC
BH CV ECHO MEAS - PULM A REVS VEL: 33.8 CM/SEC
BH CV ECHO MEAS - PULM DIAS VEL: 58.4 CM/SEC
BH CV ECHO MEAS - PULM S/D: 0.98
BH CV ECHO MEAS - PULM SYS VEL: 57.5 CM/SEC
BH CV ECHO MEAS - QP/QS: 0.91
BH CV ECHO MEAS - RAP SYSTOLE: 5 MMHG
BH CV ECHO MEAS - RV MAX PG: 1.51 MMHG
BH CV ECHO MEAS - RV V1 MAX: 61.5 CM/SEC
BH CV ECHO MEAS - RV V1 VTI: 17.7 CM
BH CV ECHO MEAS - RVDD: 2.6 CM
BH CV ECHO MEAS - RVOT DIAM: 2 CM
BH CV ECHO MEAS - RVSP: 29.4 MMHG
BH CV ECHO MEAS - SV(LVOT): 60.8 ML
BH CV ECHO MEAS - SV(MOD-SP2): 42.5 ML
BH CV ECHO MEAS - SV(MOD-SP4): 43.9 ML
BH CV ECHO MEAS - SV(RVOT): 55.6 ML
BH CV ECHO MEAS - SVI(LVOT): 30.5 ML/M2
BH CV ECHO MEAS - SVI(MOD-SP2): 21.3 ML/M2
BH CV ECHO MEAS - SVI(MOD-SP4): 22 ML/M2
BH CV ECHO MEAS - TAPSE (>1.6): 2.33 CM
BH CV ECHO MEAS - TR MAX PG: 24.4 MMHG
BH CV ECHO MEAS - TR MAX VEL: 247 CM/SEC
BH CV ECHO MEASUREMENTS AVERAGE E/E' RATIO: 7.66
BH CV XLRA - TDI S': 9.9 CM/SEC
IVRT: 85 MS
LEFT ATRIUM VOLUME INDEX: 37.9 ML/M2

## 2024-05-22 NOTE — TELEPHONE ENCOUNTER
CATHERINE Hall MD French, Tonya, RN  Echo reviewed  Heart function was moderately reduced, similar to previous study, may be slightly weaker from a calculated standpoint.  But no significant changes  Valve function was normal    Continue current medical therapy, schedule follow-up in 6 months    Attempted to call patient. No answer. VM left with return call requested.

## 2024-06-13 ENCOUNTER — TELEPHONE (OUTPATIENT)
Dept: FAMILY MEDICINE CLINIC | Facility: CLINIC | Age: 59
End: 2024-06-13
Payer: COMMERCIAL

## 2024-06-13 NOTE — TELEPHONE ENCOUNTER
Caller: Pam Cade    Relationship: Self    Best call back number:     [phone]       What medication are you requesting: LISINOPRIL 5 MG    Have you had these symptoms before:    [x] Yes  [] No    Have you been treated for these symptoms before:   [x] Yes  [] No    If a prescription is needed, what is your preferred pharmacy and phone number: [facility]     Additional notes:  PATIENT STATES THAT SHE HAS BEEN ON THIS MEDICATION FOR A LONG TIME

## 2024-06-13 NOTE — TELEPHONE ENCOUNTER
Spoke with pt she was made aware for the medication information and On license of [facility] annual appt 7/19 @ 954

## 2024-07-03 ENCOUNTER — HOSPITAL ENCOUNTER (EMERGENCY)
Facility: HOSPITAL | Age: 59
Discharge: HOME | End: 2024-07-03
Attending: EMERGENCY MEDICINE
Payer: COMMERCIAL

## 2024-07-03 ENCOUNTER — APPOINTMENT (OUTPATIENT)
Dept: GENERAL RADIOLOGY | Facility: HOSPITAL | Age: 59
End: 2024-07-03
Payer: COMMERCIAL

## 2024-07-03 VITALS
DIASTOLIC BLOOD PRESSURE: 68 MMHG | TEMPERATURE: 98.1 F | BODY MASS INDEX: 40.89 KG/M2 | RESPIRATION RATE: 16 BRPM | HEIGHT: 62 IN | WEIGHT: 222.22 LBS | HEART RATE: 60 BPM | OXYGEN SATURATION: 98 % | SYSTOLIC BLOOD PRESSURE: 121 MMHG

## 2024-07-03 DIAGNOSIS — R00.2 PALPITATIONS: Primary | ICD-10-CM

## 2024-07-03 DIAGNOSIS — R55 NEAR SYNCOPE: ICD-10-CM

## 2024-07-03 LAB
ALBUMIN SERPL-MCNC: 3.9 G/DL (ref 3.5–5.2)
ALBUMIN/GLOB SERPL: 1.5 G/DL
ALP SERPL-CCNC: 82 U/L (ref 39–117)
ALT SERPL W P-5'-P-CCNC: 11 U/L (ref 1–33)
ANION GAP SERPL CALCULATED.3IONS-SCNC: 10.7 MMOL/L (ref 5–15)
AST SERPL-CCNC: 14 U/L (ref 1–32)
BASOPHILS # BLD AUTO: 0.05 10*3/MM3 (ref 0–0.2)
BASOPHILS NFR BLD AUTO: 0.8 % (ref 0–1.5)
BILIRUB SERPL-MCNC: 0.3 MG/DL (ref 0–1.2)
BILIRUB UR QL STRIP: NEGATIVE
BUN SERPL-MCNC: 10 MG/DL (ref 6–20)
BUN/CREAT SERPL: 12.7 (ref 7–25)
CALCIUM SPEC-SCNC: 9.2 MG/DL (ref 8.6–10.5)
CHLORIDE SERPL-SCNC: 108 MMOL/L (ref 98–107)
CLARITY UR: CLEAR
CO2 SERPL-SCNC: 20.3 MMOL/L (ref 22–29)
COLOR UR: YELLOW
CREAT SERPL-MCNC: 0.79 MG/DL (ref 0.57–1)
DEPRECATED RDW RBC AUTO: 42.6 FL (ref 37–54)
EGFRCR SERPLBLD CKD-EPI 2021: 86.3 ML/MIN/1.73
EOSINOPHIL # BLD AUTO: 0.08 10*3/MM3 (ref 0–0.4)
EOSINOPHIL NFR BLD AUTO: 1.3 % (ref 0.3–6.2)
ERYTHROCYTE [DISTWIDTH] IN BLOOD BY AUTOMATED COUNT: 13.4 % (ref 12.3–15.4)
GLOBULIN UR ELPH-MCNC: 2.6 GM/DL
GLUCOSE SERPL-MCNC: 93 MG/DL (ref 65–99)
GLUCOSE UR STRIP-MCNC: NEGATIVE MG/DL
HCT VFR BLD AUTO: 40 % (ref 34–46.6)
HGB BLD-MCNC: 13.2 G/DL (ref 12–15.9)
HGB UR QL STRIP.AUTO: NEGATIVE
HOLD SPECIMEN: NORMAL
HOLD SPECIMEN: NORMAL
IMM GRANULOCYTES # BLD AUTO: 0.02 10*3/MM3 (ref 0–0.05)
IMM GRANULOCYTES NFR BLD AUTO: 0.3 % (ref 0–0.5)
KETONES UR QL STRIP: NEGATIVE
LEUKOCYTE ESTERASE UR QL STRIP.AUTO: NEGATIVE
LYMPHOCYTES # BLD AUTO: 1.74 10*3/MM3 (ref 0.7–3.1)
LYMPHOCYTES NFR BLD AUTO: 27.8 % (ref 19.6–45.3)
MAGNESIUM SERPL-MCNC: 2.3 MG/DL (ref 1.6–2.6)
MCH RBC QN AUTO: 28.9 PG (ref 26.6–33)
MCHC RBC AUTO-ENTMCNC: 33 G/DL (ref 31.5–35.7)
MCV RBC AUTO: 87.5 FL (ref 79–97)
MONOCYTES # BLD AUTO: 0.61 10*3/MM3 (ref 0.1–0.9)
MONOCYTES NFR BLD AUTO: 9.7 % (ref 5–12)
NEUTROPHILS NFR BLD AUTO: 3.77 10*3/MM3 (ref 1.7–7)
NEUTROPHILS NFR BLD AUTO: 60.1 % (ref 42.7–76)
NITRITE UR QL STRIP: NEGATIVE
NRBC BLD AUTO-RTO: 0 /100 WBC (ref 0–0.2)
PH UR STRIP.AUTO: 6.5 [PH] (ref 5–8)
PLATELET # BLD AUTO: 261 10*3/MM3 (ref 140–450)
PMV BLD AUTO: 9.6 FL (ref 6–12)
POTASSIUM SERPL-SCNC: 4 MMOL/L (ref 3.5–5.2)
PROT SERPL-MCNC: 6.5 G/DL (ref 6–8.5)
PROT UR QL STRIP: NEGATIVE
RBC # BLD AUTO: 4.57 10*6/MM3 (ref 3.77–5.28)
SODIUM SERPL-SCNC: 139 MMOL/L (ref 136–145)
SP GR UR STRIP: 1.02 (ref 1–1.03)
TROPONIN T SERPL HS-MCNC: 6 NG/L
UROBILINOGEN UR QL STRIP: NORMAL
WBC NRBC COR # BLD AUTO: 6.27 10*3/MM3 (ref 3.4–10.8)
WHOLE BLOOD HOLD COAG: NORMAL
WHOLE BLOOD HOLD SPECIMEN: NORMAL

## 2024-07-03 PROCEDURE — 96374 THER/PROPH/DIAG INJ IV PUSH: CPT

## 2024-07-03 PROCEDURE — 93005 ELECTROCARDIOGRAM TRACING: CPT

## 2024-07-03 PROCEDURE — 25010000002 ONDANSETRON PER 1 MG: Performed by: EMERGENCY MEDICINE

## 2024-07-03 PROCEDURE — 93005 ELECTROCARDIOGRAM TRACING: CPT | Performed by: EMERGENCY MEDICINE

## 2024-07-03 PROCEDURE — 83735 ASSAY OF MAGNESIUM: CPT | Performed by: EMERGENCY MEDICINE

## 2024-07-03 PROCEDURE — 99284 EMERGENCY DEPT VISIT MOD MDM: CPT

## 2024-07-03 PROCEDURE — 81003 URINALYSIS AUTO W/O SCOPE: CPT | Performed by: EMERGENCY MEDICINE

## 2024-07-03 PROCEDURE — 80053 COMPREHEN METABOLIC PANEL: CPT | Performed by: EMERGENCY MEDICINE

## 2024-07-03 PROCEDURE — 85025 COMPLETE CBC W/AUTO DIFF WBC: CPT | Performed by: EMERGENCY MEDICINE

## 2024-07-03 PROCEDURE — 84484 ASSAY OF TROPONIN QUANT: CPT | Performed by: EMERGENCY MEDICINE

## 2024-07-03 PROCEDURE — 71045 X-RAY EXAM CHEST 1 VIEW: CPT

## 2024-07-03 PROCEDURE — 36415 COLL VENOUS BLD VENIPUNCTURE: CPT | Performed by: EMERGENCY MEDICINE

## 2024-07-03 RX ORDER — SODIUM CHLORIDE 0.9 % (FLUSH) 0.9 %
10 SYRINGE (ML) INJECTION AS NEEDED
Status: DISCONTINUED | OUTPATIENT
Start: 2024-07-03 | End: 2024-07-03 | Stop reason: HOSPADM

## 2024-07-03 RX ORDER — ONDANSETRON 2 MG/ML
4 INJECTION INTRAMUSCULAR; INTRAVENOUS ONCE
Status: COMPLETED | OUTPATIENT
Start: 2024-07-03 | End: 2024-07-03

## 2024-07-03 RX ORDER — ACETAMINOPHEN 325 MG/1
975 TABLET ORAL ONCE
Status: COMPLETED | OUTPATIENT
Start: 2024-07-03 | End: 2024-07-03

## 2024-07-03 RX ADMIN — ONDANSETRON 4 MG: 2 INJECTION INTRAMUSCULAR; INTRAVENOUS at 10:57

## 2024-07-03 RX ADMIN — ACETAMINOPHEN 975 MG: 325 TABLET ORAL at 11:09

## 2024-07-03 NOTE — DISCHARGE INSTRUCTIONS
Continue with your current home medications.  Avoid any caffeine or other stimulants.  Follow-up with Dr. Hall's office to arrange for outpatient monitoring.  Follow-up with Dr. Hall.  Call for next available appointment.  Return to the ER for persistent racing heart rate, lightheaded or dizziness, or any other concerns issues that may arise.

## 2024-07-03 NOTE — Clinical Note
[facility] EMERGENCY ROOM  [address] [address]  Phone: [phone]  Fax: [phone]    Pam Cade was seen and treated in our emergency department on 7/3/2024.  She may return to work on 07/04/2024.         Thank you for choosing [facility].    Benjy Zavala,

## 2024-07-03 NOTE — ED PROVIDER NOTES
Time: 9:28 AM EDT  Date of encounter:  7/3/2024  Independent Historian/Clinical History and Information was obtained by:   Patient  Chief Complaint: Near syncope    History is limited by: N/A    History of Present Illness:  Patient is a 59 y.o. year old female who presents to the emergency department for evaluation of near syncope.  Patient was at work this morning.  Patient works at [employer] on [address].  Patient that she was doing nothing strenuous.  She is at her desk at around 820 she states that she started becoming clammy and diaphoretic.  She states that her heart was racing.  Patient went to the bathroom and splashed water on her face to see if that would help but did not.  She also reports that she tried to go the bathroom is without help.  She then thought maybe she needed something to eat and got some peanut butter crackers.  This too did not help.  Patient said that she felt wobbly when she was walking and felt she was going to pass out.  She reports that her Apple Watch reported heart rate of 156 bpm.  She called when the nurses on the floor and they got a pulse of 144.  Subsequently brought her to the ER for evaluation.  Patient reports she is at least 2 prior episodes in the past.  She is followed by Dr. Hall.    HPI    Patient Care Team  Primary Care Provider: Ally Barnard APRN  Cardiologist: Dr. Hall    Past Medical History:     Allergies   Allergen Reactions    Lortab [Hydrocodone-Acetaminophen] Itching     Pt states she takes tylenol at home     Past Medical History:   Diagnosis Date    Allergic rhinitis due to allergen 01/08/2021    Ankle pain     Arthritis     B12 deficiency 02/22/2019    Bladder disorder     Condition not found     Ulcer    Cramps of lower extremity     Folic acid deficiency 02/22/2019    Foot pain, left 03/20/2018    High cholesterol     Hypertension     Kidney problem      Past Surgical History:   Procedure Laterality Date    CARDIAC  CATHETERIZATION N/A 3/3/2022    Procedure: Left Heart Cath - right radial;  Surgeon: CATHERINE Hall MD;  Location: [facility] CATH INVASIVE LOCATION;  Service: Cardiology;  Laterality: N/A;    HIATAL HERNIA REPAIR  02/2019    HYSTERECTOMY      INCONTINENCE SURGERY      LAPAROSCOPIC CHOLECYSTECTOMY  02/2019    OTHER SURGICAL HISTORY      Artificial Joints/Limbs    OTHER SURGICAL HISTORY      Joint surgery    REVISION / REMOVAL NEUROSTIMULATOR      TOTAL KNEE ARTHROPLASTY Left      Family History   Problem Relation Age of Onset    Cancer Sister     Cancer Daughter     Arthritis Daughter        Home Medications:  Prior to Admission medications    Medication Sig Start Date End Date Taking? Authorizing Provider   acetaminophen (TYLENOL) 325 MG tablet Take 2 tablets by mouth Every 4 (Four) Hours As Needed for Mild Pain. Pt states not allergic to tylenol    Provider, MD Lito   albuterol sulfate  (90 Base) MCG/ACT inhaler Take 1-2 Puffs every 4 hours as needed 9/7/21   Ally Barnard APRN   amiodarone (PACERONE) 200 MG tablet Take 1 tablet twice a day for 3 weeks then reduce to 1 tablet once a day after that 4/1/24   CATHERINE Hall MD   amoxicillin (AMOXIL) 500 MG tablet Take 4 tabs 1 hour prior to dental procedure  Patient not taking: Reported on 4/22/2024 2/9/23      ascorbic acid (VITAMIN C) 250 MG tablet Take 1 tablet by mouth Daily.    Provider, MD Lito   azelastine (ASTELIN) 0.1 % nasal spray 2 sprays into the nostril(s) as directed by provider 2 (Two) Times a Day. Use in each nostril as directed 7/21/23   Ally Barnard APRN   benzonatate (TESSALON) 200 MG capsule Take 1 capsule by mouth 3 (Three) Times a Day As Needed for Cough.  Patient not taking: Reported on 4/22/2024 10/5/23   Ally Barnard APRN   cetirizine (zyrTEC) 10 MG tablet Take 1 tablet by mouth every night at bedtime.  Patient taking differently: Take 1 tablet by mouth Daily. 4/6/22   Akil  DORCAS Banegas   Cholecalciferol (Vitamin D3) 50 MCG (2000 UT) capsule Take 1 capsule by mouth Daily. 1/17/23   Ally Barnard APRN   colestipol (COLESTID) 1 g tablet Take 1 tablet by mouth 2 (Two) Times a Day.  Patient taking differently: Take 1 tablet by mouth Daily. 8/10/23   Ally Barnard APRN   cyanocobalamin 1000 MCG/ML injection Inject 1 mL into the appropriate muscle as directed by prescriber Every 28 (Twenty-Eight) Days for 336 days.  Patient not taking: Reported on 4/22/2024 8/8/23 7/9/24  Ally Barnard APRN   Echinacea 400 MG capsule Take 400 mg by mouth Daily.    Provider, MD Lito   eletriptan (RELPAX) 40 MG tablet Take 1 tablet by mouth 1 (One) Time As Needed for Migraine. may repeat in 2 hours if necessary    Provider, MD Lito   fluticasone (FLONASE) 50 MCG/ACT nasal spray Instill 2 sprays into the nostril(s) as directed by provider Daily. 7/8/21   Ally Barnard APRN   folic acid (FOLVITE) 1 MG tablet Take 1 tablet by mouth Daily. 3/31/23   Ally Barnard APRN   methylPREDNISolone (MEDROL) 4 MG dose pack Take as directed on package instructions.  Patient not taking: Reported on 4/22/2024 8/12/23   Cooksey, John Calvin, PA-C   metoprolol succinate XL (TOPROL-XL) 25 MG 24 hr tablet Take 1 tablet by mouth 2 (Two) Times a Day. 12/16/22   CATHERINE Hall MD   montelukast (SINGULAIR) 10 MG tablet Take 1 tablet by mouth Every Night. 10/24/23   Ally Barnard APRN   Multiple Vitamins-Minerals (b complex-C-E-zinc) tablet Take 1 tablet by mouth Daily.    Provider, MD Lito   naltrexone-bupropion ER (Contrave) 8-90 MG tablet Take 2 tablets by mouth 2 (Two) Times a Day.  Patient not taking: Reported on 4/22/2024 9/21/23   Ally Barnard APRN   sacubitril-valsartan (ENTRESTO) 49-51 MG tablet Take 1 tablet by mouth 2 (Two) Times a Day. 4/2/24   CATHERINE Hall MD   sacubitril-valsartan (ENTRESTO) 49-51 MG tablet  "Take 1 tablet by mouth 2 (Two) Times a Day. 4/22/24   CATHERINE Hall MD   spironolactone (ALDACTONE) 25 MG tablet Take 1 tablet by mouth Daily. 4/26/24   CATHERINE Hall MD   sulfamethoxazole-trimethoprim (Bactrim DS) 800-160 MG per tablet Take 1 tablet by mouth 2 (Two) Times a Day.  Patient not taking: Reported on 4/22/2024 8/17/23   Ally Barnadr APRN   topiramate (TOPAMAX) 50 MG tablet Take 1 tablet by mouth 2 (Two) Times a Day. 7/21/23   Ally Barnard APRN   valACYclovir (VALTREX) 500 MG tablet Take 1 tablet by mouth Daily. 7/21/23   Ally Barnard APRN        Social History:   Social History     Tobacco Use    Smoking status: Never    Smokeless tobacco: Never   Vaping Use    Vaping status: Never Used   Substance Use Topics    Alcohol use: Yes     Comment: Current some day occasionally drinks, has been drinking for 6-10 years    Drug use: Never         Review of Systems:  Review of Systems   Constitutional:  Positive for diaphoresis. Negative for chills and fever.   HENT:  Negative for congestion, ear pain and sore throat.    Eyes:  Negative for pain.   Respiratory:  Negative for cough, chest tightness and shortness of breath.    Cardiovascular:  Positive for palpitations. Negative for chest pain.   Gastrointestinal:  Negative for abdominal pain, diarrhea, nausea and vomiting.   Genitourinary:  Negative for flank pain and hematuria.   Musculoskeletal:  Negative for joint swelling.   Skin:  Negative for pallor.   Neurological:  Positive for syncope (Near). Negative for seizures and headaches.   All other systems reviewed and are negative.       Physical Exam:  /70   Pulse 61   Temp 98.5 °F (36.9 °C)   Resp 16   Ht 157.5 cm (62\")   Wt 101 kg (222 lb 3.6 oz)   SpO2 97%   BMI 40.65 kg/m²     Physical Exam    Vital signs were reviewed under triage note.  General appearance - Patient appears well-developed and well-nourished.  Patient is in no acute distress.  Head - " Normocephalic, atraumatic.  Pupils - Equal, round, reactive to light.  Extraocular muscles are intact.  Conjunctiva is clear.  Nasal - Normal inspection.  No evidence of trauma or epistaxis.  Tympanic membranes - Gray, intact without erythema or retractions.  Oral mucosa - Pink and moist without lesions or erythema.  Uvula is midline.  Chest wall - Atraumatic.  Chest wall is nontender.  There are no vesicular rashes noted.  Neck - Supple.  Trachea was midline.  There is no palpable lymphadenopathy or thyromegaly.  There are no meningeal signs  Lungs - Clear to auscultation and percussion bilaterally.  Heart - Regular rate and rhythm without any murmurs, clicks, or gallops.  Abdomen - Soft.  Bowel sounds are present.  There is no palpable tenderness.  There is no rebound, guarding, or rigidity.  There are no palpable masses.  There are no pulsatile masses.  Back - Spine is straight and midline.  There is no CVA tenderness.  Extremities - Intact x4 with full range of motion.  There is no palpable edema.  Pulses are intact x4 and equal.  Neurologic - Patient is awake, alert, and oriented x3.  Cranial nerves II through XII are grossly intact.  Motor and sensory functions grossly intact.  Cerebellar function was normal.  Integument - There are no rashes.  There are no petechia or purpura lesions noted.  There are no vesicular lesions noted.           Procedures:  Procedures      Medical Decision Making:      Comorbidities that affect care:    B12 deficiency, folic acid deficiency, hypertension, hyperlipidemia, dysrhythmia with a left bundle branch block    External Notes reviewed:    Previous Clinic Note: Office visit with Dr. Hall on 4/22/2024 was reviewed by me.      The following orders were placed and all results were independently analyzed by me:  Orders Placed This Encounter   Procedures    XR Chest 1 View    [place] Draw    Comprehensive Metabolic Panel    Magnesium    Single High Sensitivity Troponin T    CBC  Auto Differential    Urinalysis With Microscopic If Indicated (No Culture) - Urine, Clean Catch    NPO Diet NPO Type: Strict NPO    Undress & Gown    Continuous Pulse Oximetry    Vital Signs    Orthostatic Blood Pressure    Oxygen Therapy- Nasal Cannula; Titrate 1-6 LPM Per SpO2; 90 - 95%    POC Glucose Once    ECG 12 Lead ED Triage Standing Order; Syncope    Insert Peripheral IV    CBC & Differential    Green Top (Gel)    Lavender Top    Gold Top - SST    Light Blue Top       Medications Given in the Emergency Department:  Medications   sodium chloride 0.9 % flush 10 mL (has no administration in time range)   ondansetron (ZOFRAN) injection 4 mg (4 mg Intravenous Given 7/3/24 1057)   acetaminophen (TYLENOL) tablet 975 mg (975 mg Oral Given 7/3/24 1109)        ED Course:    ED Course as of 07/03/24 1140   Wed Jul 03, 2024 0913 EKG performed at 849 was interpreted by me to show a normal sinus rhythm with a ventricular rate of 68 bpm.  The MO interval is 105 ms.  P waves are normal.  QRS interval is widened at 152 ms.  Patient has a left bundle branch block.  Axis is leftward -35 degrees.  Some nonspecific ST-T wave change identified.  QT corrected is 439 ms. [TB]      ED Course User Index  [TB] Benjy Zavala DO       The patient was seen and evaluated the ED by me.  The above history and physical examination was performed as document.  Diagnostic data was obtained.  Results reviewed.  Findings were discussed with the patient.  I also consult with Dr. Hall.  He is also the patient up for a 30-day monitor and if that does not help then most likely set her up for a loop recorder.  I did discuss all this with the patient.  This will be no changes in her medications.  Patient is agreeable to this.  Patient for discharge home.    Labs:    Lab Results (last 24 hours)       Procedure Component Value Units Date/Time    CBC & Differential [[id number]]  (Normal) Collected: 07/03/24 0938    Specimen: Blood Updated: 07/03/24 0948     Narrative:      The following orders were created for panel order CBC & Differential.  Procedure                               Abnormality         Status                     ---------                               -----------         ------                     CBC Auto Differential[[id number]]        Normal              Final result                 Please view results for these tests on the individual orders.    CBC Auto Differential [[id number]]  (Normal) Collected: 07/03/24 0938    Specimen: Blood Updated: 07/03/24 0948     WBC 6.27 10*3/mm3      RBC 4.57 10*6/mm3      Hemoglobin 13.2 g/dL      Hematocrit 40.0 %      MCV 87.5 fL      MCH 28.9 pg      MCHC 33.0 g/dL      RDW 13.4 %      RDW-SD 42.6 fl      MPV 9.6 fL      Platelets 261 10*3/mm3      Neutrophil % 60.1 %      Lymphocyte % 27.8 %      Monocyte % 9.7 %      Eosinophil % 1.3 %      Basophil % 0.8 %      Immature Grans % 0.3 %      Neutrophils, Absolute 3.77 10*3/mm3      Lymphocytes, Absolute 1.74 10*3/mm3      Monocytes, Absolute 0.61 10*3/mm3      Eosinophils, Absolute 0.08 10*3/mm3      Basophils, Absolute 0.05 10*3/mm3      Immature Grans, Absolute 0.02 10*3/mm3      nRBC 0.0 /100 WBC     Urinalysis With Microscopic If Indicated (No Culture) - Urine, Clean Catch [[id number]]  (Normal) Collected: 07/03/24 1005    Specimen: Urine, Clean Catch Updated: 07/03/24 1019     Color, UA Yellow     Appearance, UA Clear     pH, UA 6.5     Specific Gravity, UA 1.016     Glucose, UA Negative     Ketones, UA Negative     Bilirubin, UA Negative     Blood, UA Negative     Protein, UA Negative     Leuk Esterase, UA Negative     Nitrite, UA Negative     Urobilinogen, UA 1.0 E.U./dL    Narrative:      Urine microscopic not indicated.    Comprehensive Metabolic Panel [[id number]]  (Abnormal) Collected: 07/03/24 1014    Specimen: Blood from Hand, Left Updated: 07/03/24 1049     Glucose 93 mg/dL      BUN 10 mg/dL      Creatinine 0.79 mg/dL      Sodium 139 mmol/L       Potassium 4.0 mmol/L      Chloride 108 mmol/L      CO2 20.3 mmol/L      Calcium 9.2 mg/dL      Total Protein 6.5 g/dL      Albumin 3.9 g/dL      ALT (SGPT) 11 U/L      AST (SGOT) 14 U/L      Alkaline Phosphatase 82 U/L      Total Bilirubin 0.3 mg/dL      Globulin 2.6 gm/dL      A/G Ratio 1.5 g/dL      BUN/Creatinine Ratio 12.7     Anion Gap 10.7 mmol/L      eGFR 86.3 mL/min/1.73     Narrative:      GFR Normal >60  Chronic Kidney Disease <60  Kidney Failure <15      Magnesium [[id number]]  (Normal) Collected: 07/03/24 1014    Specimen: Blood from Hand, Left Updated: 07/03/24 1049     Magnesium 2.3 mg/dL     Single High Sensitivity Troponin T [[id number]]  (Normal) Collected: 07/03/24 1014    Specimen: Blood from Hand, Left Updated: 07/03/24 1049     HS Troponin T 6 ng/L     Narrative:      High Sensitive Troponin T Reference Range:  <14.0 ng/L- Negative Female for AMI  <22.0 ng/L- Negative Male for AMI  >=14 - Abnormal Female indicating possible myocardial injury.  >=22 - Abnormal Male indicating possible myocardial injury.   Clinicians would have to utilize clinical acumen, EKG, Troponin, and serial changes to determine if it is an Acute Myocardial Infarction or myocardial injury due to an underlying chronic condition.                  Imaging:    XR Chest 1 View    Result Date: 7/3/2024  XR CHEST 1 VW Date of Exam: 7/3/2024 9:39 AM EDT Indication: Tachycardia Comparison: 1/3/2023 and prior Findings: Study limited by overlying support and monitoring apparatus. The heart and mediastinal contours are grossly unremarkable in appearance. The lungs appear clear. Osseous structures are unremarkable     Impression: No acute process Electronically Signed: Mic Cano MD  7/3/2024 9:55 AM EDT  Workstation ID: [id number]       Differential Diagnosis and Discussion:    Palpitations: Differential diagnosis includes but is not limited to anxiety, atrioventricular blocks, mitral valve disease, hypoxia, coronary artery disease,  hypokalemia, anemia, fever, COPD, congestive heart failure, pericarditis, Juaquin-Parkinson-White syndrome, pulmonary embolism, SVT, atrial fibrillation, atrial flutter, sinus tachycardia, thyrotoxicosis, and pheochromocytoma.    All labs were reviewed and interpreted by me.  All X-rays impressions were independently interpreted by me.  EKG was interpreted by me.    MDM     Amount and/or Complexity of Data Reviewed  Clinical lab tests: reviewed  Tests in the radiology section of CPT®: reviewed  Tests in the medicine section of CPT®: reviewed             Patient Care Considerations:    CT CHEST: I considered ordering a CT scan of the chest, however patient has low risk factors for pulmonary embolism.      Consultants/Shared Management Plan:    I have discussed the case with Dr. Hall who states that the patient can be safely discharged with close follow up.  He has set patient up for 30-day event monitor.  She is to call his office to get it placed.    Social Determinants of Health:    Patient is independent, reliable, and has access to care.       Disposition and Care Coordination:    Discharged: I considered escalation of care by admitting this patient to the hospital, however after discussion with Dr. Hall the patient will be discharged home with further outpatient follow-up and testing.    I have explained the patient´s condition, diagnoses and treatment plan based on the information available to me at this time. I have answered questions and addressed any concerns. The patient has a good  understanding of the patient´s diagnosis, condition, and treatment plan as can be expected at this point. The vital signs have been stable. The patient´s condition is stable and appropriate for discharge from the emergency department.      The patient will pursue further outpatient evaluation with the primary care physician or other designated or consulting physician as outlined in the discharge instructions. They are  agreeable to this plan of care and follow-up instructions have been explained in detail. The patient has received these instructions in written format and has expressed an understanding of the discharge instructions. The patient is aware that any significant change in condition or worsening of symptoms should prompt an immediate return to this or the closest emergency department or call to 911.    Final diagnoses:   Palpitations   Near syncope        ED Disposition       ED Disposition   Discharge    Condition   Stable    Comment   --               This medical record created using voice recognition software.             Benjy Zavala DO  07/04/24 3268     (0) independent

## 2024-07-05 ENCOUNTER — TELEPHONE (OUTPATIENT)
Dept: FAMILY MEDICINE CLINIC | Facility: CLINIC | Age: 59
End: 2024-07-05
Payer: COMMERCIAL

## 2024-07-07 LAB
QT INTERVAL: 468 MS
QTC INTERVAL: 497 MS

## 2024-07-18 PROBLEM — Z00.00 ANNUAL PHYSICAL EXAM: Status: ACTIVE | Noted: 2024-07-18

## 2024-07-18 PROBLEM — R00.2 PALPITATIONS: Status: ACTIVE | Noted: 2024-07-18

## 2024-07-18 NOTE — PROGRESS NOTES
Annual physical exam      Patient Name: Pam Cade  : 1965   MRN: [id number]     Chief Complaint:  annual physical exam     History of Present Illness: Pam Cade is a 59 y.o. female who is here today to annual physical exam    Also follow up for  HTN, hyperlipidemia, CHF, GERD, anemia, anxiety, depression.      Pap  hysterectomy   Mammogram-2023  Colonoscopy 2018    Follow up ER [facility] 7/3/2024  Chief Complaint: Near syncope     History is limited by: N/A     History of Present Illness:  Patient is a 59 y.o. year old female who presents to the emergency department for evaluation of near syncope.  Patient was at work this morning.  Patient works at [employer] on [address].  Patient that she was doing nothing strenuous.  She is at her desk at around 820 she states that she started becoming clammy and diaphoretic.  She states that her heart was racing.  Patient went to the bathroom and splashed water on her face to see if that would help but did not.  She also reports that she tried to go the bathroom is without help.  She then thought maybe she needed something to eat and got some peanut butter crackers.  This too did not help.  Patient said that she felt wobbly when she was walking and felt she was going to pass out.  She reports that her Apple Watch reported heart rate of 156 bpm.  She called when the nurses on the floor and they got a pulse of 144.  Subsequently brought her to the ER for evaluation.  Patient reports she is at least 2 prior episodes in the past.  She is followed by Dr. Hall.  Currently wearing event monitor placed by cardiology Dr. Hall     Pt c/o left Calf pain and swelling for several days, taking spironolactone as recommended by cardiology also patient reports wears compression stockings on in a.m. off in p.m. when she can tolerated leg does become painful    Also complaining of chronic fatigue states she was previously on B12 injections has not had in a  while would like to resume those    Subjective      Review of Systems:   Review of Systems   Constitutional:  Positive for fatigue.   HENT:  Negative for ear pain and sore throat.    Respiratory:  Negative for cough.    Cardiovascular:  Positive for palpitations and leg swelling. Negative for chest pain.   Gastrointestinal:  Negative for abdominal pain.   Genitourinary:  Negative for dysuria.   Musculoskeletal:  Positive for myalgias.        Past Medical History:   Past Medical History:   Diagnosis Date    Allergic rhinitis due to allergen 01/08/2021    Ankle pain     Arthritis     B12 deficiency 02/22/2019    Bladder disorder     Condition not found     Ulcer    Cramps of lower extremity     Folic acid deficiency 02/22/2019    Foot pain, left 03/20/2018    High cholesterol     Hypertension     Kidney problem        Past Surgical History:   Past Surgical History:   Procedure Laterality Date    CARDIAC CATHETERIZATION N/A 3/3/2022    Procedure: Left Heart Cath - right radial;  Surgeon: CATHERINE Hall MD;  Location: [facility] INVASIVE LOCATION;  Service: Cardiology;  Laterality: N/A;    HIATAL HERNIA REPAIR  02/2019    HYSTERECTOMY      INCONTINENCE SURGERY      LAPAROSCOPIC CHOLECYSTECTOMY  02/2019    OTHER SURGICAL HISTORY      Artificial Joints/Limbs    OTHER SURGICAL HISTORY      Joint surgery    REVISION / REMOVAL NEUROSTIMULATOR      TOTAL KNEE ARTHROPLASTY Left        Family History:   Family History   Problem Relation Age of Onset    Cancer Sister     Cancer Daughter     Arthritis Daughter        Social History:   Social History     Socioeconomic History    Marital status:    Tobacco Use    Smoking status: Never    Smokeless tobacco: Never   Vaping Use    Vaping status: Never Used   Substance and Sexual Activity    Alcohol use: Yes     Comment: Current some day occasionally drinks, has been drinking for 6-10 years    Drug use: Never    Sexual activity: Defer       Medications:     Current Outpatient  Medications:     cetirizine (zyrTEC) 10 MG tablet, Take 1 tablet by mouth Daily., Disp: 90 tablet, Rfl: 3    eletriptan (RELPAX) 40 MG tablet, Take 1 tablet by mouth Every 2 (Two) Hours As Needed for Migraine. may repeat in 2 hours if necessary, Disp: 8 tablet, Rfl: 11    fluticasone (FLONASE) 50 MCG/ACT nasal spray, Instill 2 sprays into the nostril(s) as directed by provider Daily., Disp: 16 g, Rfl: 11    montelukast (SINGULAIR) 10 MG tablet, Take 1 tablet by mouth Every Night., Disp: 90 tablet, Rfl: 3    topiramate (TOPAMAX) 50 MG tablet, Take 1 tablet by mouth 2 (Two) Times a Day., Disp: 180 tablet, Rfl: 3    acetaminophen (TYLENOL) 325 MG tablet, Take 2 tablets by mouth Every 4 (Four) Hours As Needed for Mild Pain. Pt states not allergic to tylenol, Disp: , Rfl:     albuterol sulfate  (90 Base) MCG/ACT inhaler, Take 1-2 Puffs every 4 hours as needed, Disp: 8.5 g, Rfl: 1    amiodarone (PACERONE) 200 MG tablet, Take 1 tablet twice a day for 3 weeks then reduce to 1 tablet once a day after that, Disp: 180 tablet, Rfl: 3    ascorbic acid (VITAMIN C) 250 MG tablet, Take 1 tablet by mouth Daily., Disp: , Rfl:     azelastine (ASTELIN) 0.1 % nasal spray, 2 sprays into the nostril(s) as directed by provider 2 (Two) Times a Day. Use in each nostril as directed, Disp: 30 mL, Rfl: 12    Cholecalciferol (Vitamin D3) 50 MCG (2000 UT) capsule, Take 1 capsule by mouth Daily., Disp: 90 capsule, Rfl: 1    colestipol (COLESTID) 1 g tablet, Take 1 tablet by mouth 2 (Two) Times a Day. (Patient taking differently: Take 1 tablet by mouth Daily.), Disp: 180 tablet, Rfl: 1    cyanocobalamin 1000 MCG/ML injection, Inject 1 mL into the appropriate muscle as directed by prescriber Every 28 (Twenty-Eight) Days., Disp: 3 mL, Rfl: 3    Echinacea 400 MG capsule, Take 400 mg by mouth Daily., Disp: , Rfl:     folic acid (FOLVITE) 1 MG tablet, Take 1 tablet by mouth Daily., Disp: 90 tablet, Rfl: 1    metoprolol succinate XL (TOPROL-XL) 25  "MG 24 hr tablet, Take 1 tablet by mouth 2 (Two) Times a Day., Disp: 180 tablet, Rfl: 3    Multiple Vitamins-Minerals (b complex-C-E-zinc) tablet, Take 1 tablet by mouth Daily., Disp: , Rfl:     sacubitril-valsartan (ENTRESTO) 49-51 MG tablet, Take 1 tablet by mouth 2 (Two) Times a Day., Disp: 60 tablet, Rfl: 0    sacubitril-valsartan (ENTRESTO) 49-51 MG tablet, Take 1 tablet by mouth 2 (Two) Times a Day., Disp: 180 tablet, Rfl: 3    spironolactone (ALDACTONE) 25 MG tablet, Take 1 tablet by mouth Daily., Disp: 90 tablet, Rfl: 3    valACYclovir (VALTREX) 500 MG tablet, Take 1 tablet by mouth Daily., Disp: 90 tablet, Rfl: 1    Current Facility-Administered Medications:     methylPREDNISolone acetate (DEPO-medrol) injection 80 mg, 80 mg, Intramuscular, Once, Ally Barnard APRN    Allergies:   Allergies   Allergen Reactions    Lortab [Hydrocodone-Acetaminophen] Itching     Pt states she takes tylenol at home             Objective     Physical Exam:  Vital Signs:   Vitals:    07/19/24 1612   BP: 144/82   Pulse: 66   Resp: 18   Temp: 96.8 °F (36 °C)   SpO2: 97%   Weight: 100 kg (221 lb)   Height: 157.5 cm (62.01\")     Body mass index is 40.41 kg/m².           Physical Exam  HENT:      Right Ear: Tympanic membrane normal.      Left Ear: Tympanic membrane normal.      Nose: Nose normal.      Mouth/Throat:      Mouth: Mucous membranes are moist.   Eyes:      Conjunctiva/sclera: Conjunctivae normal.   Neck:      Vascular: No carotid bruit.   Cardiovascular:      Rate and Rhythm: Normal rate and regular rhythm.      Heart sounds: Normal heart sounds. No murmur heard.  Pulmonary:      Effort: Pulmonary effort is normal.      Breath sounds: Normal breath sounds.   Abdominal:      General: Bowel sounds are normal.      Palpations: Abdomen is soft.   Musculoskeletal:      Right lower leg: No edema.      Left lower leg: Edema present.      Comments: +1 left pedal edema positive Homans' sign left calf tender to palpation "   Skin:     General: Skin is warm and dry.      Capillary Refill: Capillary refill takes less than 2 seconds.   Neurological:      Mental Status: She is alert.   Psychiatric:         Mood and Affect: Mood normal.         Behavior: Behavior normal.             Assessment / Plan      Assessment/Plan:   Diagnoses and all orders for this visit:    1. Annual physical exam (Primary)    2. Primary hypertension    3. Mixed hyperlipidemia  -     Lipid Panel    4. Impaired fasting glucose  -     Hemoglobin A1c  -     Comprehensive Metabolic Panel    5. Gastroesophageal reflux disease without esophagitis    6. Iron deficiency  -     Iron Profile  -     Ferritin  -     CBC Auto Differential    7. Chronic nonintractable headache, unspecified headache type    8. Palpitations    9. B12 deficiency  -     Vitamin B12    10. Vitamin D deficiency  -     Vitamin D,25-Hydroxy    11. Allergic rhinitis, unspecified seasonality, unspecified trigger    12. Left leg swelling    13. Fatigue, unspecified type    14. Pain of left lower extremity    15. Screening for thyroid disorder  -     TSH  -     T4, Free    16. Screening mammogram for breast cancer  -     Mammo Screening Digital Tomosynthesis Bilateral With CAD; Future    Other orders  -     eletriptan (RELPAX) 40 MG tablet; Take 1 tablet by mouth Every 2 (Two) Hours As Needed for Migraine. may repeat in 2 hours if necessary  Dispense: 8 tablet; Refill: 11  -     topiramate (TOPAMAX) 50 MG tablet; Take 1 tablet by mouth 2 (Two) Times a Day.  Dispense: 180 tablet; Refill: 3  -     fluticasone (FLONASE) 50 MCG/ACT nasal spray; Instill 2 sprays into the nostril(s) as directed by provider Daily.  Dispense: 16 g; Refill: 11  -     montelukast (SINGULAIR) 10 MG tablet; Take 1 tablet by mouth Every Night.  Dispense: 90 tablet; Refill: 3  -     cetirizine (zyrTEC) 10 MG tablet; Take 1 tablet by mouth Daily.  Dispense: 90 tablet; Refill: 3  -     cyanocobalamin 1000 MCG/ML injection; Inject 1 mL  "into the appropriate muscle as directed by prescriber Every 28 (Twenty-Eight) Days.  Dispense: 3 mL; Refill: 3         Annual physical exam Megan states screening reviewed with patient will provide order for screening mammogram  Hypertension currently controlled at this time  Hyperlipidemia will obtain lipid panel to monitor  Impaired fasting glucose obtain hemoglobin A1c to monitor conducive to carbs sugars exercise segments daily weight loss  Reflux currently controlled at this time  Chronic migraines controlled preventive therapy Topamax abortive therapy Relpax less than 14 migraine days per month  Palpitations uncontrolled currently wearing event monitor for cardiology follow-up as scheduled continue Toprol and amiodarone per cardiology  B12 deficiency will provide a refill of B12 injections obtain labs to monitor  Vit D deficiency will obtain labs to monitor currently taking 2000 units OTC supplementation  Allergic rhinitis currently controlled with flonase singulair and zyrtec will provide refills  Left leg swelling and pain will obtain stat venous Doppler at [facility] imaging scan negative patient notified do recommend continue compression stockings elevation when sitting      Follow Up:   Return in about 1 year (around 7/19/2025).    Caleb Barnard, APRN    \"Please note that portions of this note were completed with a voice recognition program.\"    "

## 2024-07-19 ENCOUNTER — OFFICE VISIT (OUTPATIENT)
Dept: FAMILY MEDICINE CLINIC | Facility: CLINIC | Age: 59
End: 2024-07-19
Payer: COMMERCIAL

## 2024-07-19 VITALS
TEMPERATURE: 96.8 F | RESPIRATION RATE: 18 BRPM | SYSTOLIC BLOOD PRESSURE: 144 MMHG | WEIGHT: 221 LBS | OXYGEN SATURATION: 97 % | HEIGHT: 62 IN | HEART RATE: 66 BPM | DIASTOLIC BLOOD PRESSURE: 82 MMHG | BODY MASS INDEX: 40.67 KG/M2

## 2024-07-19 DIAGNOSIS — E61.1 IRON DEFICIENCY: ICD-10-CM

## 2024-07-19 DIAGNOSIS — E78.2 MIXED HYPERLIPIDEMIA: ICD-10-CM

## 2024-07-19 DIAGNOSIS — E55.9 VITAMIN D DEFICIENCY: ICD-10-CM

## 2024-07-19 DIAGNOSIS — R00.2 PALPITATIONS: ICD-10-CM

## 2024-07-19 DIAGNOSIS — J30.9 ALLERGIC RHINITIS, UNSPECIFIED SEASONALITY, UNSPECIFIED TRIGGER: ICD-10-CM

## 2024-07-19 DIAGNOSIS — M79.605 PAIN OF LEFT LOWER EXTREMITY: ICD-10-CM

## 2024-07-19 DIAGNOSIS — M79.89 LEFT LEG SWELLING: ICD-10-CM

## 2024-07-19 DIAGNOSIS — R73.01 IMPAIRED FASTING GLUCOSE: ICD-10-CM

## 2024-07-19 DIAGNOSIS — K21.9 GASTROESOPHAGEAL REFLUX DISEASE WITHOUT ESOPHAGITIS: ICD-10-CM

## 2024-07-19 DIAGNOSIS — E53.8 B12 DEFICIENCY: ICD-10-CM

## 2024-07-19 DIAGNOSIS — Z00.00 ANNUAL PHYSICAL EXAM: Primary | ICD-10-CM

## 2024-07-19 DIAGNOSIS — Z12.31 SCREENING MAMMOGRAM FOR BREAST CANCER: ICD-10-CM

## 2024-07-19 DIAGNOSIS — R51.9 CHRONIC NONINTRACTABLE HEADACHE, UNSPECIFIED HEADACHE TYPE: ICD-10-CM

## 2024-07-19 DIAGNOSIS — R53.83 FATIGUE, UNSPECIFIED TYPE: ICD-10-CM

## 2024-07-19 DIAGNOSIS — G89.29 CHRONIC NONINTRACTABLE HEADACHE, UNSPECIFIED HEADACHE TYPE: ICD-10-CM

## 2024-07-19 DIAGNOSIS — Z13.29 SCREENING FOR THYROID DISORDER: ICD-10-CM

## 2024-07-19 DIAGNOSIS — I10 PRIMARY HYPERTENSION: ICD-10-CM

## 2024-07-19 PROCEDURE — 99396 PREV VISIT EST AGE 40-64: CPT | Performed by: NURSE PRACTITIONER

## 2024-07-19 RX ORDER — MONTELUKAST SODIUM 10 MG/1
10 TABLET ORAL NIGHTLY
Qty: 90 TABLET | Refills: 3 | Status: SHIPPED | OUTPATIENT
Start: 2024-07-19

## 2024-07-19 RX ORDER — CYANOCOBALAMIN 1000 UG/ML
1000 INJECTION, SOLUTION INTRAMUSCULAR; SUBCUTANEOUS
Qty: 3 ML | Refills: 3 | Status: SHIPPED | OUTPATIENT
Start: 2024-07-19

## 2024-07-19 RX ORDER — FLUTICASONE PROPIONATE 50 MCG
2 SPRAY, SUSPENSION (ML) NASAL DAILY
Qty: 16 G | Refills: 11 | Status: SHIPPED | OUTPATIENT
Start: 2024-07-19

## 2024-07-19 RX ORDER — CETIRIZINE HYDROCHLORIDE 10 MG/1
10 TABLET ORAL DAILY
Qty: 90 TABLET | Refills: 3 | Status: SHIPPED | OUTPATIENT
Start: 2024-07-19

## 2024-07-19 RX ORDER — ELETRIPTAN HYDROBROMIDE 40 MG/1
40 TABLET, FILM COATED ORAL
Qty: 8 TABLET | Refills: 11 | Status: SHIPPED | OUTPATIENT
Start: 2024-07-19

## 2024-07-19 RX ORDER — TOPIRAMATE 50 MG/1
50 TABLET, FILM COATED ORAL 2 TIMES DAILY
Qty: 180 TABLET | Refills: 3 | Status: SHIPPED | OUTPATIENT
Start: 2024-07-19

## 2024-07-22 RX ORDER — FOLIC ACID 1 MG/1
1 TABLET ORAL DAILY
Qty: 90 TABLET | Refills: 1 | Status: SHIPPED | OUTPATIENT
Start: 2024-07-22

## 2024-07-22 RX ORDER — METOPROLOL SUCCINATE 25 MG/1
25 TABLET, EXTENDED RELEASE ORAL 2 TIMES DAILY
Qty: 60 TABLET | Refills: 1 | Status: SHIPPED | OUTPATIENT
Start: 2024-07-22

## 2024-07-22 RX ORDER — MONTELUKAST SODIUM 4 MG/1
1 TABLET, CHEWABLE ORAL 2 TIMES DAILY
Qty: 180 TABLET | Refills: 1 | Status: SHIPPED | OUTPATIENT
Start: 2024-07-22

## 2024-07-27 ENCOUNTER — LAB (OUTPATIENT)
Dept: LAB | Facility: HOSPITAL | Age: 59
End: 2024-07-27
Payer: COMMERCIAL

## 2024-07-27 LAB
25(OH)D3 SERPL-MCNC: 22 NG/ML (ref 30–100)
ALBUMIN SERPL-MCNC: 4.2 G/DL (ref 3.5–5.2)
ALBUMIN/GLOB SERPL: 1.6 G/DL
ALP SERPL-CCNC: 95 U/L (ref 39–117)
ALT SERPL W P-5'-P-CCNC: 18 U/L (ref 1–33)
ANION GAP SERPL CALCULATED.3IONS-SCNC: 8.9 MMOL/L (ref 5–15)
AST SERPL-CCNC: 18 U/L (ref 1–32)
BASOPHILS # BLD AUTO: 0.06 10*3/MM3 (ref 0–0.2)
BASOPHILS NFR BLD AUTO: 1.3 % (ref 0–1.5)
BILIRUB SERPL-MCNC: 0.4 MG/DL (ref 0–1.2)
BUN SERPL-MCNC: 12 MG/DL (ref 6–20)
BUN/CREAT SERPL: 12.2 (ref 7–25)
CALCIUM SPEC-SCNC: 9.3 MG/DL (ref 8.6–10.5)
CHLORIDE SERPL-SCNC: 107 MMOL/L (ref 98–107)
CHOLEST SERPL-MCNC: 226 MG/DL (ref 0–200)
CO2 SERPL-SCNC: 24.1 MMOL/L (ref 22–29)
CREAT SERPL-MCNC: 0.98 MG/DL (ref 0.57–1)
DEPRECATED RDW RBC AUTO: 43.5 FL (ref 37–54)
EGFRCR SERPLBLD CKD-EPI 2021: 66.6 ML/MIN/1.73
EOSINOPHIL # BLD AUTO: 0.1 10*3/MM3 (ref 0–0.4)
EOSINOPHIL NFR BLD AUTO: 2.2 % (ref 0.3–6.2)
ERYTHROCYTE [DISTWIDTH] IN BLOOD BY AUTOMATED COUNT: 13.3 % (ref 12.3–15.4)
FERRITIN SERPL-MCNC: 49.8 NG/ML (ref 13–150)
GLOBULIN UR ELPH-MCNC: 2.7 GM/DL
GLUCOSE SERPL-MCNC: 96 MG/DL (ref 65–99)
HBA1C MFR BLD: 5.8 % (ref 4.8–5.6)
HCT VFR BLD AUTO: 40.6 % (ref 34–46.6)
HDLC SERPL-MCNC: 73 MG/DL (ref 40–60)
HGB BLD-MCNC: 13.1 G/DL (ref 12–15.9)
IMM GRANULOCYTES # BLD AUTO: 0.01 10*3/MM3 (ref 0–0.05)
IMM GRANULOCYTES NFR BLD AUTO: 0.2 % (ref 0–0.5)
IRON 24H UR-MRATE: 93 MCG/DL (ref 37–145)
IRON SATN MFR SERPL: 23 % (ref 20–50)
LDLC SERPL CALC-MCNC: 139 MG/DL (ref 0–100)
LDLC/HDLC SERPL: 1.87 {RATIO}
LYMPHOCYTES # BLD AUTO: 2.13 10*3/MM3 (ref 0.7–3.1)
LYMPHOCYTES NFR BLD AUTO: 47.3 % (ref 19.6–45.3)
MCH RBC QN AUTO: 28.7 PG (ref 26.6–33)
MCHC RBC AUTO-ENTMCNC: 32.3 G/DL (ref 31.5–35.7)
MCV RBC AUTO: 88.8 FL (ref 79–97)
MONOCYTES # BLD AUTO: 0.4 10*3/MM3 (ref 0.1–0.9)
MONOCYTES NFR BLD AUTO: 8.9 % (ref 5–12)
NEUTROPHILS NFR BLD AUTO: 1.8 10*3/MM3 (ref 1.7–7)
NEUTROPHILS NFR BLD AUTO: 40.1 % (ref 42.7–76)
NRBC BLD AUTO-RTO: 0 /100 WBC (ref 0–0.2)
PLATELET # BLD AUTO: 297 10*3/MM3 (ref 140–450)
PMV BLD AUTO: 9.8 FL (ref 6–12)
POTASSIUM SERPL-SCNC: 4.2 MMOL/L (ref 3.5–5.2)
PROT SERPL-MCNC: 6.9 G/DL (ref 6–8.5)
RBC # BLD AUTO: 4.57 10*6/MM3 (ref 3.77–5.28)
SODIUM SERPL-SCNC: 140 MMOL/L (ref 136–145)
T4 FREE SERPL-MCNC: 1.57 NG/DL (ref 0.92–1.68)
TIBC SERPL-MCNC: 408 MCG/DL (ref 298–536)
TRANSFERRIN SERPL-MCNC: 274 MG/DL (ref 200–360)
TRIGL SERPL-MCNC: 81 MG/DL (ref 0–150)
TSH SERPL DL<=0.05 MIU/L-ACNC: 3.66 UIU/ML (ref 0.27–4.2)
VIT B12 BLD-MCNC: 253 PG/ML (ref 211–946)
VLDLC SERPL-MCNC: 14 MG/DL (ref 5–40)
WBC NRBC COR # BLD AUTO: 4.5 10*3/MM3 (ref 3.4–10.8)

## 2024-07-27 PROCEDURE — 82607 VITAMIN B-12: CPT | Performed by: NURSE PRACTITIONER

## 2024-07-27 PROCEDURE — 80050 GENERAL HEALTH PANEL: CPT | Performed by: NURSE PRACTITIONER

## 2024-07-27 PROCEDURE — 82306 VITAMIN D 25 HYDROXY: CPT | Performed by: NURSE PRACTITIONER

## 2024-07-27 PROCEDURE — 83036 HEMOGLOBIN GLYCOSYLATED A1C: CPT | Performed by: NURSE PRACTITIONER

## 2024-07-27 PROCEDURE — 80061 LIPID PANEL: CPT | Performed by: NURSE PRACTITIONER

## 2024-07-27 PROCEDURE — 83540 ASSAY OF IRON: CPT | Performed by: NURSE PRACTITIONER

## 2024-07-27 PROCEDURE — 82728 ASSAY OF FERRITIN: CPT | Performed by: NURSE PRACTITIONER

## 2024-07-27 PROCEDURE — 84439 ASSAY OF FREE THYROXINE: CPT | Performed by: NURSE PRACTITIONER

## 2024-07-27 PROCEDURE — 84466 ASSAY OF TRANSFERRIN: CPT | Performed by: NURSE PRACTITIONER

## 2024-07-29 RX ORDER — ERGOCALCIFEROL 1.25 MG/1
50000 CAPSULE ORAL WEEKLY
Qty: 13 CAPSULE | Refills: 1 | Status: SHIPPED | OUTPATIENT
Start: 2024-07-29

## 2024-07-31 ENCOUNTER — DOCUMENTATION (OUTPATIENT)
Dept: FAMILY MEDICINE CLINIC | Facility: CLINIC | Age: 59
End: 2024-07-31
Payer: COMMERCIAL

## 2024-07-31 DIAGNOSIS — Z12.31 SCREENING MAMMOGRAM FOR BREAST CANCER: Primary | ICD-10-CM

## 2024-08-06 ENCOUNTER — TELEPHONE (OUTPATIENT)
Dept: FAMILY MEDICINE CLINIC | Facility: CLINIC | Age: 59
End: 2024-08-06

## 2024-08-06 NOTE — TELEPHONE ENCOUNTER
Caller: Pam Cade    Relationship to patient: Self    Best call back number: [phone]    Chief complaint: new spot on breast     Type of visit: OFFICE VISIT     Requested date: AFTER 8-15-24      Additional notes:PATIENT IS REQUESTING ANOTHER DATE AFTER 8-15-24 IN AUGUST TO BE SEEN BY HER PROVIDER AND [facility] HAD NO AVAILABILITY UNTIL SEPTEMBER.

## 2024-08-13 ENCOUNTER — TELEPHONE (OUTPATIENT)
Dept: FAMILY MEDICINE CLINIC | Facility: CLINIC | Age: 59
End: 2024-08-13
Payer: COMMERCIAL

## 2024-08-13 NOTE — TELEPHONE ENCOUNTER
Caller: Pam Cade    Relationship to patient: Self    Best call back number: [phone]     Chief complaint: FOLLOW UP ON TEST RESULTS    Type of visit: OFFICE VISIT    Requested date: ASAP AFTER 08.15.2024      Additional notes:PATIENT STATES THAT SHE WILL HAVE HER MAMMOGRAM AND ULTRASOUND RESULTS ON 08.15.2024 AND WOULD LIKE A FOLLOW UP AS SOON AS SHE CAN GET ONE

## 2024-08-14 DIAGNOSIS — N63.0 MASS OF BREAST, UNSPECIFIED LATERALITY: Primary | ICD-10-CM

## 2024-08-14 DIAGNOSIS — N64.4 BREAST PAIN: ICD-10-CM

## 2024-08-15 ENCOUNTER — HOSPITAL ENCOUNTER (OUTPATIENT)
Dept: ULTRASOUND IMAGING | Facility: HOSPITAL | Age: 59
Discharge: HOME | End: 2024-08-15
Payer: COMMERCIAL

## 2024-08-15 ENCOUNTER — HOSPITAL ENCOUNTER (OUTPATIENT)
Dept: MAMMOGRAPHY | Facility: HOSPITAL | Age: 59
Discharge: HOME | End: 2024-08-15
Payer: COMMERCIAL

## 2024-08-15 DIAGNOSIS — Z12.31 SCREENING MAMMOGRAM FOR BREAST CANCER: ICD-10-CM

## 2024-08-15 DIAGNOSIS — N64.4 BREAST PAIN: ICD-10-CM

## 2024-08-15 DIAGNOSIS — N63.0 MASS OF BREAST, UNSPECIFIED LATERALITY: ICD-10-CM

## 2024-08-15 PROCEDURE — 76642 ULTRASOUND BREAST LIMITED: CPT

## 2024-08-15 PROCEDURE — 77066 DX MAMMO INCL CAD BI: CPT

## 2024-08-15 PROCEDURE — G0279 TOMOSYNTHESIS, MAMMO: HCPCS

## 2024-08-16 ENCOUNTER — OFFICE VISIT (OUTPATIENT)
Dept: CARDIOLOGY | Facility: CLINIC | Age: 59
End: 2024-08-16
Payer: COMMERCIAL

## 2024-08-16 VITALS
HEART RATE: 77 BPM | HEIGHT: 62 IN | WEIGHT: 224.4 LBS | SYSTOLIC BLOOD PRESSURE: 128 MMHG | BODY MASS INDEX: 41.3 KG/M2 | DIASTOLIC BLOOD PRESSURE: 80 MMHG

## 2024-08-16 DIAGNOSIS — I10 HYPERTENSION, ESSENTIAL: ICD-10-CM

## 2024-08-16 DIAGNOSIS — R00.2 PALPITATIONS: ICD-10-CM

## 2024-08-16 DIAGNOSIS — R06.09 DYSPNEA ON EXERTION: ICD-10-CM

## 2024-08-16 DIAGNOSIS — I47.19 PAROXYSMAL ATRIAL TACHYCARDIA: Primary | ICD-10-CM

## 2024-08-16 DIAGNOSIS — I42.8 NICM (NONISCHEMIC CARDIOMYOPATHY): ICD-10-CM

## 2024-08-16 PROCEDURE — 99214 OFFICE O/P EST MOD 30 MIN: CPT | Performed by: INTERNAL MEDICINE

## 2024-08-16 NOTE — PROGRESS NOTES
Chief Complaint  SMITH, NICM, Palpitations, <9>Paroxysmal atrial tachycardia, and [facility] ER FOLLOW UP     Subjective            Pam Cade presents to [facility] CARDIOLOGY      Ms. Cade is here for follow-up evaluation management of nonischemic cardiomyopathy with moderate LV dysfunction, paroxysmal atrial tachycardia on amiodarone therapy, essential hypertension.  She has been clinically stable but recently has noted some palpitations and subjective tachycardia.  She went to the emergency room in early July for this.  Her EKG showed normal sinus rhythm and her workup was negative.  Since then she wore an event monitor for 27 days and this showed only rare ectopic beats.  The patient had multiple symptoms during that time which correlated with sinus rhythm or less frequently a single ectopic beat.    [facility]  Past Medical History:   Diagnosis Date    Allergic rhinitis due to allergen 01/08/2021    Ankle pain     Arthritis     B12 deficiency 02/22/2019    Bladder disorder     Condition not found     Ulcer    Cramps of lower extremity     Folic acid deficiency 02/22/2019    Foot pain, left 03/20/2018    High cholesterol     Hypertension     Kidney problem          SURGICALHX  Past Surgical History:   Procedure Laterality Date    CARDIAC CATHETERIZATION N/A 3/3/2022    Procedure: Left Heart Cath - right radial;  Surgeon: CATHERINE Hall MD;  Location: [facility] CATH INVASIVE LOCATION;  Service: Cardiology;  Laterality: N/A;    HIATAL HERNIA REPAIR  02/2019    HYSTERECTOMY      INCONTINENCE SURGERY      LAPAROSCOPIC CHOLECYSTECTOMY  02/2019    OTHER SURGICAL HISTORY      Artificial Joints/Limbs    OTHER SURGICAL HISTORY      Joint surgery    REVISION / REMOVAL NEUROSTIMULATOR      TOTAL KNEE ARTHROPLASTY Left         SOC  Social History     Socioeconomic History    Marital status:    Tobacco Use    Smoking status: Never    Smokeless tobacco: Never   Vaping Use    Vaping status: Never Used   Substance and  Sexual Activity    Alcohol use: Yes     Comment: Current some day occasionally drinks, has been drinking for 6-10 years    Drug use: Never    Sexual activity: Defer         FAMHX  Family History   Problem Relation Age of Onset    Cancer Sister     Cancer Daughter     Arthritis Daughter           ALLERGY  Allergies   Allergen Reactions    Lortab [Hydrocodone-Acetaminophen] Itching     Pt states she takes tylenol at home        MEDSCURRENT    Current Outpatient Medications:     acetaminophen (TYLENOL) 325 MG tablet, Take 2 tablets by mouth Every 4 (Four) Hours As Needed for Mild Pain. Pt states not allergic to tylenol, Disp: , Rfl:     albuterol sulfate  (90 Base) MCG/ACT inhaler, Take 1-2 Puffs every 4 hours as needed, Disp: 8.5 g, Rfl: 1    amiodarone (PACERONE) 200 MG tablet, Take 1 tablet twice a day for 3 weeks then reduce to 1 tablet once a day after that, Disp: 180 tablet, Rfl: 3    ascorbic acid (VITAMIN C) 250 MG tablet, Take 1 tablet by mouth Daily., Disp: , Rfl:     azelastine (ASTELIN) 0.1 % nasal spray, 2 sprays into the nostril(s) as directed by provider 2 (Two) Times a Day. Use in each nostril as directed, Disp: 30 mL, Rfl: 12    cetirizine (zyrTEC) 10 MG tablet, Take 1 tablet by mouth Daily., Disp: 90 tablet, Rfl: 3    cetirizine (zyrTEC) 10 MG tablet, Take 1 tablet by mouth daily, Disp: 90 tablet, Rfl: 3    Cholecalciferol (Vitamin D3) 50 MCG (2000 UT) capsule, Take 1 capsule by mouth Daily., Disp: 90 capsule, Rfl: 1    colestipol (COLESTID) 1 g tablet, Take 1 tablet by mouth 2 (Two) Times a Day., Disp: 180 tablet, Rfl: 1    cyanocobalamin 1000 MCG/ML injection, Inject 1 mL into the appropriate muscle as directed by prescriber Every 28 (Twenty-Eight) Days., Disp: 3 mL, Rfl: 3    cyanocobalamin 1000 MCG/ML injection, inject 1 ml into the appropriate muscle as directed every 28 days, Disp: 3 mL, Rfl: 5    Echinacea 400 MG capsule, Take 400 mg by mouth Daily., Disp: , Rfl:     eletriptan (RELPAX)  40 MG tablet, Take 1 tablet by mouth Every 2 (Two) Hours As Needed for Migraine. may repeat in 2 hours if necessary, Disp: 8 tablet, Rfl: 11    eletriptan (RELPAX) 40 MG tablet, Take 1 tablet by mouth every 2 hours as needed for migraine may repeat in 2 hours if necessary, Disp: 8 tablet, Rfl: 5    fluticasone (FLONASE) 50 MCG/ACT nasal spray, Instill 2 sprays into the nostril(s) as directed by provider Daily., Disp: 16 g, Rfl: 11    fluticasone (FLONASE) 50 MCG/ACT nasal spray, instill 2 sprays into nostril(s) daily as directed, Disp: 16 g, Rfl: 5    folic acid (FOLVITE) 1 MG tablet, Take 1 tablet by mouth Daily., Disp: 90 tablet, Rfl: 1    metoprolol succinate XL (TOPROL-XL) 25 MG 24 hr tablet, Take 1 tablet by mouth 2 (Two) Times a Day., Disp: 60 tablet, Rfl: 1    montelukast (SINGULAIR) 10 MG tablet, Take 1 tablet by mouth Every Night., Disp: 90 tablet, Rfl: 3    montelukast (SINGULAIR) 10 MG tablet, Take 1 tablet by mouth every night, Disp: 90 tablet, Rfl: 3    Multiple Vitamins-Minerals (b complex-C-E-zinc) tablet, Take 1 tablet by mouth Daily., Disp: , Rfl:     sacubitril-valsartan (ENTRESTO) 49-51 MG tablet, Take 1 tablet by mouth 2 (Two) Times a Day., Disp: 60 tablet, Rfl: 0    sacubitril-valsartan (ENTRESTO) 49-51 MG tablet, Take 1 tablet by mouth 2 (Two) Times a Day., Disp: 180 tablet, Rfl: 3    spironolactone (ALDACTONE) 25 MG tablet, Take 1 tablet by mouth Daily., Disp: 90 tablet, Rfl: 3    topiramate (TOPAMAX) 50 MG tablet, Take 1 tablet by mouth 2 (Two) Times a Day., Disp: 180 tablet, Rfl: 3    topiramate (TOPAMAX) 50 MG tablet, Take 1 tablet by mouth twice a day, Disp: 180 tablet, Rfl: 3    valACYclovir (VALTREX) 500 MG tablet, Take 1 tablet by mouth Daily., Disp: 90 tablet, Rfl: 1    vitamin D (ERGOCALCIFEROL) 1.25 MG (40816 UT) capsule capsule, Take 1 capsule by mouth 1 (One) Time Per Week., Disp: 13 capsule, Rfl: 1    Current Facility-Administered Medications:     methylPREDNISolone acetate  "(DEPO-medrol) injection 80 mg, 80 mg, Intramuscular, Once, Ally Barnard APRN      Review of Systems   Constitutional: Negative.   HENT: Negative.     Eyes: Negative.    Cardiovascular:  Positive for palpitations. Negative for chest pain.   Endocrine: Negative.    Hematologic/Lymphatic: Negative.    Skin: Negative.    Musculoskeletal: Negative.    Gastrointestinal: Negative.    Genitourinary: Negative.    Neurological: Negative.    Psychiatric/Behavioral: Negative.          Objective     /80   Pulse 77   Ht 157.5 cm (62.01\")   Wt 102 kg (224 lb 6.4 oz)   BMI 41.03 kg/m²       General Appearance:   well developed  well nourished  HENT:   oropharynx moist  lips not cyanotic  Neck:  thyroid not enlarged  supple  Respiratory:  no respiratory distress  normal breath sounds  no rales  Cardiovascular:  no jugular venous distention  regular rhythm  apical impulse normal  S1 normal, S2 normal  no S3, no S4   no murmur  no rub, no thrill  carotid pulses normal; no bruit  pedal pulses normal  lower extremity edema: none    Musculoskeletal:  no clubbing of fingers.   normocephalic, head atraumatic  Skin:   warm, dry  Psychiatric:  judgement and insight appropriate  normal mood and affect      Result Review :     The following data was reviewed by: Juan Hall MD on 04/22/2024:    CMP          7/3/2024    10:14 7/27/2024    07:10   CMP   Glucose 93  96    BUN 10  12    Creatinine 0.79  0.98    EGFR 86.3  66.6    Sodium 139  140    Potassium 4.0  4.2    Chloride 108  107    Calcium 9.2  9.3    Total Protein 6.5  6.9    Albumin 3.9  4.2    Globulin 2.6  2.7    Total Bilirubin 0.3  0.4    Alkaline Phosphatase 82  95    AST (SGOT) 14  18    ALT (SGPT) 11  18    Albumin/Globulin Ratio 1.5  1.6    BUN/Creatinine Ratio 12.7  12.2    Anion Gap 10.7  8.9      CBC          7/3/2024    09:38 7/27/2024    07:10   CBC   WBC 6.27  4.50    RBC 4.57  4.57    Hemoglobin 13.2  13.1    Hematocrit 40.0  40.6  "   MCV 87.5  88.8    MCH 28.9  28.7    MCHC 33.0  32.3    RDW 13.4  13.3    Platelets 261  297      Lipid Panel          7/27/2024    07:10   Lipid Panel   Total Cholesterol 226    Triglycerides 81    HDL Cholesterol 73    VLDL Cholesterol 14    LDL Cholesterol  139    LDL/HDL Ratio 1.87      TSH          7/27/2024    07:10   TSH   TSH 3.660        Data reviewed : Primary care records reviewed event monitor reviewed with the patient    Procedures           Assessment and Plan        ASSESSMENT:  Encounter Diagnoses   Name Primary?    Paroxysmal atrial tachycardia Yes    NICM (nonischemic cardiomyopathy)     Palpitations     Dyspnea on exertion          PLAN:    1.  Nonischemic cardiomyopathy-moderate LV dysfunction, continue guideline directed medical therapy, she is clinically compensated at this time  2.  Palpitations with paroxysmal atrial tachycardia-she had recurrence when we stopped amiodarone so we will continue that for arrhythmia management.  Recent event monitor showed no recurrences.  I reassured her today that her symptoms did not correlate with dysrhythmia at this point.  3.  Essential hypertension-controlled, continue current medical therapy    Follow-up in about 6 months          Patient was given instructions and counseling regarding her condition or for health maintenance advice. Please see specific information pulled into the AVS if appropriate.             CATHERINE Hall MD  8/16/2024    10:48 EDT

## 2024-10-29 DIAGNOSIS — B00.1 RECURRENT COLD SORES: ICD-10-CM

## 2024-10-29 RX ORDER — CETIRIZINE HYDROCHLORIDE 10 MG/1
10 TABLET ORAL DAILY
Qty: 90 TABLET | Refills: 3 | Status: SHIPPED | OUTPATIENT
Start: 2024-10-29

## 2024-10-29 RX ORDER — ACETAMINOPHEN 325 MG/1
650 TABLET ORAL EVERY 4 HOURS PRN
Qty: 30 TABLET | Refills: 0 | Status: SHIPPED | OUTPATIENT
Start: 2024-10-29

## 2024-10-29 RX ORDER — ELETRIPTAN HYDROBROMIDE 40 MG/1
40 TABLET, FILM COATED ORAL
Qty: 8 TABLET | Refills: 11 | Status: SHIPPED | OUTPATIENT
Start: 2024-10-29

## 2024-10-29 RX ORDER — VALACYCLOVIR HYDROCHLORIDE 500 MG/1
500 TABLET, FILM COATED ORAL DAILY
Qty: 90 TABLET | Refills: 1 | Status: SHIPPED | OUTPATIENT
Start: 2024-10-29

## 2024-10-29 RX ORDER — CETIRIZINE HYDROCHLORIDE 10 MG/1
10 TABLET ORAL DAILY
Qty: 90 TABLET | Refills: 3 | Status: SHIPPED | OUTPATIENT
Start: 2024-10-29 | End: 2024-10-29 | Stop reason: SDUPTHER

## 2024-10-29 RX ORDER — ELETRIPTAN HYDROBROMIDE 40 MG/1
40 TABLET, FILM COATED ORAL
Qty: 8 TABLET | Refills: 11 | Status: SHIPPED | OUTPATIENT
Start: 2024-10-29 | End: 2024-10-29 | Stop reason: SDUPTHER

## 2025-01-29 ENCOUNTER — TELEPHONE (OUTPATIENT)
Dept: CARDIOLOGY | Facility: CLINIC | Age: 60
End: 2025-01-29
Payer: COMMERCIAL

## 2025-01-29 RX ORDER — TOPIRAMATE 50 MG/1
50 TABLET, FILM COATED ORAL 2 TIMES DAILY
Qty: 180 TABLET | Refills: 3 | Status: SHIPPED | OUTPATIENT
Start: 2025-01-29

## 2025-01-29 RX ORDER — CYANOCOBALAMIN 1000 UG/ML
1000 INJECTION, SOLUTION INTRAMUSCULAR; SUBCUTANEOUS
Qty: 3 ML | Refills: 3 | Status: SHIPPED | OUTPATIENT
Start: 2025-01-29

## 2025-01-29 RX ORDER — COLESTIPOL HYDROCHLORIDE 1 G/1
1 TABLET ORAL 2 TIMES DAILY
Qty: 180 TABLET | Refills: 1 | Status: SHIPPED | OUTPATIENT
Start: 2025-01-29

## 2025-01-29 RX ORDER — FOLIC ACID 1 MG/1
1 TABLET ORAL DAILY
Qty: 90 TABLET | Refills: 1 | Status: SHIPPED | OUTPATIENT
Start: 2025-01-29

## 2025-01-29 RX ORDER — METOPROLOL SUCCINATE 25 MG/1
25 TABLET, EXTENDED RELEASE ORAL 2 TIMES DAILY
Qty: 60 TABLET | Refills: 1 | Status: SHIPPED | OUTPATIENT
Start: 2025-01-29

## 2025-01-29 RX ORDER — FLUTICASONE PROPIONATE 50 MCG
2 SPRAY, SUSPENSION (ML) NASAL DAILY
Qty: 16 G | Refills: 11 | Status: SHIPPED | OUTPATIENT
Start: 2025-01-29

## 2025-01-29 RX ORDER — ERGOCALCIFEROL 1.25 MG/1
50000 CAPSULE, LIQUID FILLED ORAL WEEKLY
Qty: 13 CAPSULE | Refills: 1 | Status: SHIPPED | OUTPATIENT
Start: 2025-01-29

## 2025-01-29 NOTE — TELEPHONE ENCOUNTER
The [facility] received a fax that requires your attention. The document has been indexed to the patient’s chart for your review.      Reason for sending: EXTERNAL MEDICAL RECORD NOTIFICATION     Documents Description: [facility] PA-[facility]-1.29.25    Name of Sender:  [facility]     Date Indexed: 1.29.25

## 2025-01-29 NOTE — TELEPHONE ENCOUNTER
PT CALLED, STATED SHE NEEDED A PA FOR [employer].  HAVE NOT SEEN THIS COME THROUGH YET.    SHE IS ALMOST OUT OF [employer] MEDS.  PROVIDED SAMPLES UNTIL PA IS INITIATED AND COMPLETE.

## 2025-02-03 ENCOUNTER — TELEPHONE (OUTPATIENT)
Dept: CARDIOLOGY | Facility: CLINIC | Age: 60
End: 2025-02-03
Payer: COMMERCIAL

## 2025-02-03 NOTE — TELEPHONE ENCOUNTER
[facility] received a fax that requires your attention. The document has been indexed to the patient’s chart for your review.      Reason for sending: EXTERNAL MEDICAL RECORD NOTIFICATION     Documents Description: [facility] PA NOT NEEDED-[facility]-2.3.25    Name of Sender: [facility]     Date Indexed: 2.3.25

## 2025-03-03 ENCOUNTER — OFFICE VISIT (OUTPATIENT)
Dept: CARDIOLOGY | Facility: CLINIC | Age: 60
End: 2025-03-03
Payer: COMMERCIAL

## 2025-03-03 VITALS
HEART RATE: 98 BPM | WEIGHT: 221 LBS | HEIGHT: 62 IN | DIASTOLIC BLOOD PRESSURE: 112 MMHG | BODY MASS INDEX: 40.67 KG/M2 | SYSTOLIC BLOOD PRESSURE: 162 MMHG

## 2025-03-03 DIAGNOSIS — I10 HYPERTENSION, ESSENTIAL: ICD-10-CM

## 2025-03-03 DIAGNOSIS — I47.19 PAROXYSMAL ATRIAL TACHYCARDIA: ICD-10-CM

## 2025-03-03 DIAGNOSIS — I42.8 NICM (NONISCHEMIC CARDIOMYOPATHY): Primary | ICD-10-CM

## 2025-03-03 PROCEDURE — 99214 OFFICE O/P EST MOD 30 MIN: CPT | Performed by: INTERNAL MEDICINE

## 2025-03-03 RX ORDER — METOPROLOL SUCCINATE 50 MG/1
50 TABLET, EXTENDED RELEASE ORAL 2 TIMES DAILY
Qty: 90 TABLET | Refills: 3 | Status: SHIPPED | OUTPATIENT
Start: 2025-03-03

## 2025-03-03 RX ORDER — AMIODARONE HYDROCHLORIDE 200 MG/1
200 TABLET ORAL DAILY
Qty: 90 TABLET | Refills: 3 | Status: SHIPPED | OUTPATIENT
Start: 2025-03-03

## 2025-03-03 NOTE — PROGRESS NOTES
Chief Complaint  6 month follow up , Rapid Heart Rate, <9>NICM (nonischemic cardiomyopathy), and Hypertension    Subjective            Pam Cade presents to [facility] CARDIOLOGY      Ms. Cade is here for follow-up evaluation management of nonischemic cardiomyopathy with moderate LV dysfunction, paroxysmal atrial tachycardia on amiodarone therapy, essential hypertension.  Overall she is doing relatively well.  She stays active and works full-time.  She denies chest pain, significant palpitations or excessive shortness of breath.    [facility]  Past Medical History:   Diagnosis Date    Allergic rhinitis due to allergen 01/08/2021    Ankle pain     Arthritis     B12 deficiency 02/22/2019    Bladder disorder     Condition not found     Ulcer    Cramps of lower extremity     Folic acid deficiency 02/22/2019    Foot pain, left 03/20/2018    High cholesterol     Hypertension     Kidney problem          SURGICALHX  Past Surgical History:   Procedure Laterality Date    CARDIAC CATHETERIZATION N/A 3/3/2022    Procedure: Left Heart Cath - right radial;  Surgeon: CATHERINE Hall MD;  Location: formerly [facility] CATH INVASIVE LOCATION;  Service: Cardiology;  Laterality: N/A;    HIATAL HERNIA REPAIR  02/2019    HYSTERECTOMY      INCONTINENCE SURGERY      LAPAROSCOPIC CHOLECYSTECTOMY  02/2019    OTHER SURGICAL HISTORY      Artificial Joints/Limbs    OTHER SURGICAL HISTORY      Joint surgery    REVISION / REMOVAL NEUROSTIMULATOR      TOTAL KNEE ARTHROPLASTY Left         SOC  Social History     Socioeconomic History    Marital status:    Tobacco Use    Smoking status: Never    Smokeless tobacco: Never   Vaping Use    Vaping status: Never Used   Substance and Sexual Activity    Alcohol use: Yes     Comment: Current some day occasionally drinks, has been drinking for 6-10 years    Drug use: Never    Sexual activity: Defer         FAMHX  Family History   Problem Relation Age of Onset    Cancer Sister     Cancer Daughter      Arthritis Daughter           ALLERGY  Allergies   Allergen Reactions    Lortab [Hydrocodone-Acetaminophen] Itching     Pt states she takes tylenol at home        MEDSCURRENT    Current Outpatient Medications:     albuterol sulfate  (90 Base) MCG/ACT inhaler, Take 1-2 Puffs every 4 hours as needed, Disp: 8.5 g, Rfl: 1    amiodarone (PACERONE) 200 MG tablet, Take 1 tablet by mouth Daily., Disp: 90 tablet, Rfl: 3    ascorbic acid (VITAMIN C) 250 MG tablet, Take 1 tablet by mouth Daily., Disp: , Rfl:     azelastine (ASTELIN) 0.1 % nasal spray, 2 sprays into the nostril(s) as directed by provider 2 (Two) Times a Day. Use in each nostril as directed, Disp: 30 mL, Rfl: 12    cetirizine (zyrTEC) 10 MG tablet, Take 1 tablet by mouth daily, Disp: 90 tablet, Rfl: 3    Cholecalciferol (Vitamin D3) 50 MCG (2000 UT) capsule, Take 1 capsule by mouth Daily., Disp: 90 capsule, Rfl: 1    colestipol (COLESTID) 1 g tablet, Take 1 tablet by mouth 2 (Two) Times a Day., Disp: 180 tablet, Rfl: 1    cyanocobalamin 1000 MCG/ML injection, inject 1 ml into the appropriate muscle as directed every 28 days, Disp: 3 mL, Rfl: 5    cyanocobalamin 1000 MCG/ML injection, Inject 1 mL into the appropriate muscle as directed by prescriber Every 28 (Twenty-Eight) Days., Disp: 3 mL, Rfl: 3    Echinacea 400 MG capsule, Take 400 mg by mouth Daily., Disp: , Rfl:     eletriptan (RELPAX) 40 MG tablet, Take 1 tablet by mouth Every 2 (Two) Hours As Needed for Migraine. may repeat in 2 hours if necessary, Disp: 8 tablet, Rfl: 11    fluticasone (FLONASE) 50 MCG/ACT nasal spray, Instill 2 sprays into the nostril(s) as directed by provider Daily., Disp: 16 g, Rfl: 11    folic acid (FOLVITE) 1 MG tablet, Take 1 tablet by mouth Daily., Disp: 90 tablet, Rfl: 1    metoprolol succinate XL (TOPROL-XL) 50 MG 24 hr tablet, Take 1 tablet by mouth 2 (Two) Times a Day., Disp: 90 tablet, Rfl: 3    montelukast (SINGULAIR) 10 MG tablet, Take 1 tablet by mouth every night,  Disp: 90 tablet, Rfl: 3    Multiple Vitamins-Minerals (b complex-C-E-zinc) tablet, Take 1 tablet by mouth Daily., Disp: , Rfl:     ondansetron ODT (ZOFRAN-ODT) 4 MG disintegrating tablet, Place 1 tablet on the tongue Every 8 (Eight) Hours As Needed for Nausea or Vomiting for up to 8 doses., Disp: 8 tablet, Rfl: 0    sacubitril-valsartan (ENTRESTO) 49-51 MG tablet, Take 1 tablet by mouth 2 (Two) Times a Day., Disp: 90 tablet, Rfl: 3    sacubitril-valsartan (ENTRESTO) 49-51 MG tablet, Take 1 tablet by mouth 2 (Two) Times a Day., Disp: 28 tablet, Rfl: 0    spironolactone (ALDACTONE) 25 MG tablet, Take 1 tablet by mouth Daily., Disp: 90 tablet, Rfl: 3    topiramate (TOPAMAX) 50 MG tablet, Take 1 tablet by mouth 2 (Two) Times a Day., Disp: 180 tablet, Rfl: 3    valACYclovir (VALTREX) 500 MG tablet, Take 1 tablet by mouth Daily., Disp: 90 tablet, Rfl: 1    cetirizine (zyrTEC) 10 MG tablet, Take 1 tablet by mouth Daily. (Patient not taking: Reported on 3/3/2025), Disp: 90 tablet, Rfl: 3    eletriptan (RELPAX) 40 MG tablet, Take 1 tablet by mouth every 2 hours as needed for migraine may repeat in 2 hours if necessary (Patient not taking: Reported on 3/3/2025), Disp: 8 tablet, Rfl: 5    fluticasone (FLONASE) 50 MCG/ACT nasal spray, instill 2 sprays into nostril(s) daily as directed (Patient not taking: Reported on 3/3/2025), Disp: 16 g, Rfl: 5    montelukast (SINGULAIR) 10 MG tablet, Take 1 tablet by mouth Every Night. (Patient not taking: Reported on 3/3/2025), Disp: 90 tablet, Rfl: 3    topiramate (TOPAMAX) 50 MG tablet, Take 1 tablet by mouth twice a day (Patient not taking: Reported on 3/3/2025), Disp: 180 tablet, Rfl: 3    vitamin D (ERGOCALCIFEROL) 1.25 MG (26317 UT) capsule capsule, Take 1 capsule by mouth 1 (One) Time Per Week. (Patient not taking: Reported on 3/3/2025), Disp: 13 capsule, Rfl: 1    Current Facility-Administered Medications:     methylPREDNISolone acetate (DEPO-medrol) injection 80 mg, 80 mg,  "Intramuscular, Once, Colasanti, Chrysalis Mindi, APRN      Review of Systems   Constitutional: Negative.   HENT: Negative.     Eyes: Negative.    Cardiovascular:  Positive for irregular heartbeat. Negative for chest pain.   Respiratory:  Negative for shortness of breath.    Endocrine: Negative.    Hematologic/Lymphatic: Negative.    Skin: Negative.    Musculoskeletal: Negative.    Gastrointestinal: Negative.    Genitourinary: Negative.    Neurological: Negative.    Psychiatric/Behavioral: Negative.          Objective     BP (!) 162/112   Pulse 98   Ht 157.5 cm (62.01\")   Wt 100 kg (221 lb)   BMI 40.41 kg/m²       General Appearance:   well developed  well nourished  HENT:   oropharynx moist  lips not cyanotic  Neck:  thyroid not enlarged  supple  Respiratory:  no respiratory distress  normal breath sounds  no rales  Cardiovascular:  no jugular venous distention  regular rhythm  apical impulse normal  S1 normal, S2 normal  no S3, no S4   no murmur  no rub, no thrill  carotid pulses normal; no bruit  pedal pulses normal  lower extremity edema: none    Musculoskeletal:  no clubbing of fingers.   normocephalic, head atraumatic  Skin:   warm, dry  Psychiatric:  judgement and insight appropriate  normal mood and affect      Result Review :     The following data was reviewed by: Juan Hall MD on 04/22/2024:    CMP          7/3/2024    10:14 7/27/2024    07:10   CMP   Glucose 93  96    BUN 10  12    Creatinine 0.79  0.98    EGFR 86.3  66.6    Sodium 139  140    Potassium 4.0  4.2    Chloride 108  107    Calcium 9.2  9.3    Total Protein 6.5  6.9    Albumin 3.9  4.2    Globulin 2.6  2.7    Total Bilirubin 0.3  0.4    Alkaline Phosphatase 82  95    AST (SGOT) 14  18    ALT (SGPT) 11  18    Albumin/Globulin Ratio 1.5  1.6    BUN/Creatinine Ratio 12.7  12.2    Anion Gap 10.7  8.9      CBC          7/3/2024    09:38 7/27/2024    07:10   CBC   WBC 6.27  4.50    RBC 4.57  4.57    Hemoglobin 13.2  13.1  "   Hematocrit 40.0  40.6    MCV 87.5  88.8    MCH 28.9  28.7    MCHC 33.0  32.3    RDW 13.4  13.3    Platelets 261  297      Lipid Panel          7/27/2024    07:10   Lipid Panel   Total Cholesterol 226    Triglycerides 81    HDL Cholesterol 73    VLDL Cholesterol 14    LDL Cholesterol  139    LDL/HDL Ratio 1.87      TSH          7/27/2024    07:10   TSH   TSH 3.660        Data reviewed : Primary care records reviewed    Procedures           Assessment and Plan        ASSESSMENT:  Encounter Diagnoses   Name Primary?    NICM (nonischemic cardiomyopathy) Yes    Paroxysmal atrial tachycardia     Hypertension, essential          PLAN:    1.  Nonischemic cardiomyopathy-clinically stable, continue guideline directed medical therapy.  Waiting on PA for Entresto.  2.  Palpitations with paroxysmal atrial tachycardia-continue long-term amiodarone.  We stopped amiodarone previously and she had a recurrence.  3.  Essential hypertension-elevated today, increase metoprolol to 50 mg daily    Routine follow-up will be arranged unless clinical problems arise          Patient was given instructions and counseling regarding her condition or for health maintenance advice. Please see specific information pulled into the AVS if appropriate.             CATHERINE Hall MD  3/3/2025    15:23 EST

## 2025-03-04 ENCOUNTER — TELEPHONE (OUTPATIENT)
Dept: CARDIOLOGY | Facility: CLINIC | Age: 60
End: 2025-03-04
Payer: COMMERCIAL

## 2025-03-04 RX ORDER — SPIRONOLACTONE 25 MG/1
25 TABLET ORAL DAILY
Qty: 90 TABLET | Refills: 3 | Status: SHIPPED | OUTPATIENT
Start: 2025-03-04

## 2025-03-04 NOTE — TELEPHONE ENCOUNTER
SPOKE TO PT.  SHE DOES NOT NEED A PA FOR ENTRESTO.  SEE FAX IN MEDIA FROM [employer].    SIGNED HER UP FOR A $10 COPAY CARD FOR ENTRESTO.  I PRINTED IT OUT AT HER REQUEST AND LEAVING IT AT THE LIAISON DESK FOR HER TO  TODAY.  SHE DOES NOT QUALIFY FOR PT ASST DUE TO HAVING COMMERCIAL INS.

## 2025-03-04 NOTE — TELEPHONE ENCOUNTER
Rx Refill Note  Requested Prescriptions     Pending Prescriptions Disp Refills    spironolactone (ALDACTONE) 25 MG tablet 90 tablet 3     Sig: Take 1 tablet by mouth Daily.        LAST OFFICE VISIT:  03/03/2025     NEXT OFFICE VISIT:  9/9/2025     Does the medication requests match the last office note:    [x] Yes   [] No    Does this refill request meet protocol details for MA to approve:     [x] Yes   [] No

## 2025-03-20 NOTE — PROGRESS NOTES
Follow Up Office Visit      Patient Name: Pam Cade  : 1965   MRN: [id number]     Chief Complaint:    Chief Complaint   Patient presents with    Hypertension    Hyperlipidemia    Congestive Heart Failure    Anemia    Anxiety    Depression       History of Present Illness: Pam Cade is a 60 y.o. female who is here today to follow up for  HTN, hyperlipidemia, CHF, GERD, anemia, anxiety, depression.                  Labs-2024  Pap - hysterectomy   Mammogram-2024  Colonoscopy 2018      Thyroid US  ordered by ENT   IMPRESSION:                 1. In the left isthmus there is a 1.2 centimeter solid hypoechoic nodule likely T rads 4 nodule.    Recommend continued follow-up in 12 months.    KRYSTAL BUENO MD         Electronically Signed and Approved By: KRYSTAL BUENO MD on 2022         Follow-up cardiology consult per progress note 3/3/2025  PLAN:     1.  Nonischemic cardiomyopathy-clinically stable, continue guideline directed medical therapy.  Waiting on PA for Entresto.  2.  Palpitations with paroxysmal atrial tachycardia-continue long-term amiodarone.  We stopped amiodarone previously and she had a recurrence.  3.  Essential hypertension-elevated today, increase metoprolol to 50 mg daily         Subjective      Review of Systems:   Review of Systems   Constitutional:  Negative for fever.   Respiratory:  Negative for cough.    Cardiovascular:  Negative for chest pain.   Gastrointestinal:  Negative for abdominal pain.   Genitourinary:  Negative for dysuria.   Musculoskeletal:  Negative for myalgias.   Neurological:  Negative for headaches.        Past Medical History:   Past Medical History:   Diagnosis Date    Allergic rhinitis due to allergen 2021    Ankle pain     Arthritis     B12 deficiency 2019    Bladder disorder     Condition not found     Ulcer    Cramps of lower extremity     Folic acid deficiency 2019    Foot pain, left 2018    High cholesterol      Hypertension     Kidney problem        Past Surgical History:   Past Surgical History:   Procedure Laterality Date    CARDIAC CATHETERIZATION N/A 3/3/2022    Procedure: Left Heart Cath - right radial;  Surgeon: CATHERINE Hall MD;  Location: [facility] INVASIVE LOCATION;  Service: Cardiology;  Laterality: N/A;    HIATAL HERNIA REPAIR  02/2019    HYSTERECTOMY      INCONTINENCE SURGERY      LAPAROSCOPIC CHOLECYSTECTOMY  02/2019    OTHER SURGICAL HISTORY      Artificial Joints/Limbs    OTHER SURGICAL HISTORY      Joint surgery    REVISION / REMOVAL NEUROSTIMULATOR      TOTAL KNEE ARTHROPLASTY Left        Family History:   Family History   Problem Relation Age of Onset    Cancer Sister     Cancer Daughter     Arthritis Daughter        Social History:   Social History     Socioeconomic History    Marital status:    Tobacco Use    Smoking status: Never    Smokeless tobacco: Never   Vaping Use    Vaping status: Never Used   Substance and Sexual Activity    Alcohol use: Yes     Comment: Current some day occasionally drinks, has been drinking for 6-10 years    Drug use: Never    Sexual activity: Defer       Medications:     Current Outpatient Medications:     albuterol sulfate  (90 Base) MCG/ACT inhaler, Take 1-2 Puffs every 4 hours as needed, Disp: 8.5 g, Rfl: 1    amiodarone (PACERONE) 200 MG tablet, Take 1 tablet by mouth Daily., Disp: 90 tablet, Rfl: 3    ascorbic acid (VITAMIN C) 250 MG tablet, Take 1 tablet by mouth Daily., Disp: , Rfl:     azelastine (ASTELIN) 0.1 % nasal spray, Administer 2 sprays into the nostril(s) as directed by provider 2 (Two) Times a Day. Use in each nostril as directed, Disp: 30 mL, Rfl: 12    cetirizine (zyrTEC) 10 MG tablet, Take 1 tablet by mouth daily, Disp: 90 tablet, Rfl: 3    colestipol (COLESTID) 1 g tablet, Take 1 tablet by mouth 2 (Two) Times a Day., Disp: 180 tablet, Rfl: 3    cyanocobalamin 1000 MCG/ML injection, Inject 1 mL into the appropriate muscle as directed  by prescriber Every 28 (Twenty-Eight) Days., Disp: 3 mL, Rfl: 3    Echinacea 400 MG capsule, Take 400 mg by mouth Daily., Disp: , Rfl:     eletriptan (RELPAX) 40 MG tablet, Take 1 tablet by mouth Every 2 (Two) Hours As Needed for Migraine. may repeat in 2 hours if necessary, Disp: 8 tablet, Rfl: 11    fluticasone (FLONASE) 50 MCG/ACT nasal spray, Instill 2 sprays into the nostril(s) as directed by provider Daily., Disp: 16 g, Rfl: 3    folic acid (FOLVITE) 1 MG tablet, Take 1 tablet by mouth Daily., Disp: 90 tablet, Rfl: 3    metoprolol succinate XL (TOPROL-XL) 50 MG 24 hr tablet, Take 1 tablet by mouth 2 (Two) Times a Day., Disp: 90 tablet, Rfl: 3    montelukast (SINGULAIR) 10 MG tablet, Take 1 tablet by mouth Every Night., Disp: 90 tablet, Rfl: 3    Multiple Vitamins-Minerals (b complex-C-E-zinc) tablet, Take 1 tablet by mouth Daily., Disp: , Rfl:     ondansetron ODT (ZOFRAN-ODT) 4 MG disintegrating tablet, Place 1 tablet on the tongue Every 8 (Eight) Hours As Needed for Nausea or Vomiting for up to 8 doses., Disp: 8 tablet, Rfl: 0    sacubitril-valsartan (ENTRESTO) 49-51 MG tablet, Take 1 tablet by mouth 2 (Two) Times a Day., Disp: 90 tablet, Rfl: 3    sacubitril-valsartan (ENTRESTO) 49-51 MG tablet, Take 1 tablet by mouth 2 (Two) Times a Day., Disp: 28 tablet, Rfl: 0    spironolactone (ALDACTONE) 25 MG tablet, Take 1 tablet by mouth Daily., Disp: 90 tablet, Rfl: 3    topiramate (TOPAMAX) 50 MG tablet, Take 1 tablet by mouth 2 (Two) Times a Day., Disp: 180 tablet, Rfl: 3    valACYclovir (VALTREX) 500 MG tablet, Take 1 tablet by mouth Daily., Disp: 90 tablet, Rfl: 3    vitamin D (ERGOCALCIFEROL) 1.25 MG (95608 UT) capsule capsule, Take 1 capsule by mouth 1 (One) Time Per Week., Disp: 13 capsule, Rfl: 3    Current Facility-Administered Medications:     methylPREDNISolone acetate (DEPO-medrol) injection 80 mg, 80 mg, Intramuscular, Once, Ally Barnard APRN    Allergies:   Allergies   Allergen Reactions  "   Lortab [Hydrocodone-Acetaminophen] Itching     Pt states she takes tylenol at home           Objective     Physical Exam:  Vital Signs:   Vitals:    03/21/25 1440   BP: 146/95   Pulse: 96   Temp: 98.1 °F (36.7 °C)   SpO2: 97%   Weight: 102 kg (224 lb 12.8 oz)   Height: 162.6 cm (64\")   PainSc: 0-No pain     Body mass index is 38.59 kg/m².           Physical Exam  Constitutional:       Appearance: She is obese.   HENT:      Right Ear: Tympanic membrane normal.      Left Ear: Tympanic membrane normal.      Nose: Nose normal.      Mouth/Throat:      Mouth: Mucous membranes are moist.   Eyes:      Conjunctiva/sclera: Conjunctivae normal.   Neck:      Thyroid: No thyroid tenderness.      Vascular: No carotid bruit.   Cardiovascular:      Rate and Rhythm: Normal rate and regular rhythm.      Heart sounds: Normal heart sounds. No murmur heard.  Pulmonary:      Effort: Pulmonary effort is normal.      Breath sounds: Normal breath sounds.   Abdominal:      General: Bowel sounds are normal.      Palpations: Abdomen is soft.   Musculoskeletal:      Right lower leg: No edema.      Left lower leg: No edema.   Skin:     General: Skin is warm and dry.   Neurological:      Mental Status: She is alert.   Psychiatric:         Mood and Affect: Mood normal.         Behavior: Behavior normal.             Assessment / Plan      Assessment/Plan:   Diagnoses and all orders for this visit:    1. Primary hypertension (Primary)  -     CBC Auto Differential    2. Mixed hyperlipidemia  -     Lipid Panel    3. Chronic systolic heart failure    4. Palpitations    5. Cardiomyopathy, dilated    6. Impaired fasting glucose  -     Comprehensive Metabolic Panel  -     Hemoglobin A1c    7. Vitamin D deficiency  -     Vitamin D,25-Hydroxy    8. Thyroid nodule  -     US Thyroid    9. Screening for thyroid disorder  -     TSH  -     T4, Free    10. Recurrent cold sores  -     valACYclovir (VALTREX) 500 MG tablet; Take 1 tablet by mouth Daily.  " Dispense: 90 tablet; Refill: 3    Other orders  -     metoprolol succinate XL (TOPROL-XL) 50 MG 24 hr tablet; Take 1 tablet by mouth 2 (Two) Times a Day.  Dispense: 90 tablet; Refill: 3  -     topiramate (TOPAMAX) 50 MG tablet; Take 1 tablet by mouth 2 (Two) Times a Day.  Dispense: 180 tablet; Refill: 3  -     vitamin D (ERGOCALCIFEROL) 1.25 MG (56821 UT) capsule capsule; Take 1 capsule by mouth 1 (One) Time Per Week.  Dispense: 13 capsule; Refill: 3  -     montelukast (SINGULAIR) 10 MG tablet; Take 1 tablet by mouth Every Night.  Dispense: 90 tablet; Refill: 3  -     cetirizine (zyrTEC) 10 MG tablet; Take 1 tablet by mouth daily  Dispense: 90 tablet; Refill: 3  -     colestipol (COLESTID) 1 g tablet; Take 1 tablet by mouth 2 (Two) Times a Day.  Dispense: 180 tablet; Refill: 3  -     cyanocobalamin 1000 MCG/ML injection; Inject 1 mL into the appropriate muscle as directed by prescriber Every 28 (Twenty-Eight) Days.  Dispense: 3 mL; Refill: 3  -     azelastine (ASTELIN) 0.1 % nasal spray; Administer 2 sprays into the nostril(s) as directed by provider 2 (Two) Times a Day. Use in each nostril as directed  Dispense: 30 mL; Refill: 12  -     albuterol sulfate  (90 Base) MCG/ACT inhaler; Take 1-2 Puffs every 4 hours as needed  Dispense: 8.5 g; Refill: 1  -     eletriptan (RELPAX) 40 MG tablet; Take 1 tablet by mouth Every 2 (Two) Hours As Needed for Migraine. may repeat in 2 hours if necessary  Dispense: 8 tablet; Refill: 11  -     fluticasone (FLONASE) 50 MCG/ACT nasal spray; Instill 2 sprays into the nostril(s) as directed by provider Daily.  Dispense: 16 g; Refill: 3  -     folic acid (FOLVITE) 1 MG tablet; Take 1 tablet by mouth Daily.  Dispense: 90 tablet; Refill: 3       Hypertension elevated in clinic today reviewed cardiology note Toprol-XL has been increased recommend return for a blood pressure check in 2 weeks monitor at home once daily  Hyperlipidemia will obtain lipid panel to monitor  Chronic heart  "failure currently on Entresto per cardiology  Palpitations currently controlled  Cardiomyopathy reviewed cardiology note with patient no signs of fluid overload  Impaired fasting glucose obtain hemoglobin A1c to monitor  Vitamin D deficiency will continue once weekly supplementation obtain labs to monitor  Thyroid nodule reviewed last thyroid ultrasound by ear nose and throat will obtain thyroid ultrasound to monitor  Recurrent cold sores will provide a refill of Valtrex      Follow Up:   Return in about 6 months (around 9/21/2025).    Caleb Barnard, APRN    \"Please note that portions of this note were completed with a voice recognition program.\"     "

## 2025-03-21 ENCOUNTER — OFFICE VISIT (OUTPATIENT)
Dept: FAMILY MEDICINE CLINIC | Facility: CLINIC | Age: 60
End: 2025-03-21
Payer: COMMERCIAL

## 2025-03-21 VITALS
HEART RATE: 96 BPM | OXYGEN SATURATION: 97 % | HEIGHT: 64 IN | DIASTOLIC BLOOD PRESSURE: 90 MMHG | TEMPERATURE: 98.1 F | BODY MASS INDEX: 38.38 KG/M2 | SYSTOLIC BLOOD PRESSURE: 180 MMHG | WEIGHT: 224.8 LBS

## 2025-03-21 DIAGNOSIS — I50.22 CHRONIC SYSTOLIC HEART FAILURE: ICD-10-CM

## 2025-03-21 DIAGNOSIS — R73.01 IMPAIRED FASTING GLUCOSE: ICD-10-CM

## 2025-03-21 DIAGNOSIS — I42.0 CARDIOMYOPATHY, DILATED: ICD-10-CM

## 2025-03-21 DIAGNOSIS — E78.2 MIXED HYPERLIPIDEMIA: ICD-10-CM

## 2025-03-21 DIAGNOSIS — B00.1 RECURRENT COLD SORES: ICD-10-CM

## 2025-03-21 DIAGNOSIS — E04.1 THYROID NODULE: ICD-10-CM

## 2025-03-21 DIAGNOSIS — Z13.29 SCREENING FOR THYROID DISORDER: ICD-10-CM

## 2025-03-21 DIAGNOSIS — I10 PRIMARY HYPERTENSION: Primary | ICD-10-CM

## 2025-03-21 DIAGNOSIS — E55.9 VITAMIN D DEFICIENCY: ICD-10-CM

## 2025-03-21 DIAGNOSIS — R00.2 PALPITATIONS: ICD-10-CM

## 2025-03-21 LAB
25(OH)D3 SERPL-MCNC: 21.1 NG/ML (ref 30–100)
ALBUMIN SERPL-MCNC: 4.2 G/DL (ref 3.5–5.2)
ALBUMIN/GLOB SERPL: 1.5 G/DL
ALP SERPL-CCNC: 96 U/L (ref 39–117)
ALT SERPL W P-5'-P-CCNC: 19 U/L (ref 1–33)
ANION GAP SERPL CALCULATED.3IONS-SCNC: 10.8 MMOL/L (ref 5–15)
AST SERPL-CCNC: 25 U/L (ref 1–32)
BASOPHILS # BLD AUTO: 0.08 10*3/MM3 (ref 0–0.2)
BASOPHILS NFR BLD AUTO: 1.3 % (ref 0–1.5)
BILIRUB SERPL-MCNC: 0.3 MG/DL (ref 0–1.2)
BUN SERPL-MCNC: 13 MG/DL (ref 8–23)
BUN/CREAT SERPL: 13.3 (ref 7–25)
CALCIUM SPEC-SCNC: 9.4 MG/DL (ref 8.6–10.5)
CHLORIDE SERPL-SCNC: 105 MMOL/L (ref 98–107)
CHOLEST SERPL-MCNC: 234 MG/DL (ref 0–200)
CO2 SERPL-SCNC: 26.2 MMOL/L (ref 22–29)
CREAT SERPL-MCNC: 0.98 MG/DL (ref 0.57–1)
DEPRECATED RDW RBC AUTO: 40.2 FL (ref 37–54)
EGFRCR SERPLBLD CKD-EPI 2021: 66.2 ML/MIN/1.73
EOSINOPHIL # BLD AUTO: 0.11 10*3/MM3 (ref 0–0.4)
EOSINOPHIL NFR BLD AUTO: 1.8 % (ref 0.3–6.2)
ERYTHROCYTE [DISTWIDTH] IN BLOOD BY AUTOMATED COUNT: 13 % (ref 12.3–15.4)
GLOBULIN UR ELPH-MCNC: 2.8 GM/DL
GLUCOSE SERPL-MCNC: 89 MG/DL (ref 65–99)
HBA1C MFR BLD: 5.8 % (ref 4.8–5.6)
HCT VFR BLD AUTO: 38.3 % (ref 34–46.6)
HDLC SERPL-MCNC: 75 MG/DL (ref 40–60)
HGB BLD-MCNC: 12.9 G/DL (ref 12–15.9)
IMM GRANULOCYTES # BLD AUTO: 0.02 10*3/MM3 (ref 0–0.05)
IMM GRANULOCYTES NFR BLD AUTO: 0.3 % (ref 0–0.5)
LDLC SERPL CALC-MCNC: 136 MG/DL (ref 0–100)
LDLC/HDLC SERPL: 1.77 {RATIO}
LYMPHOCYTES # BLD AUTO: 2.05 10*3/MM3 (ref 0.7–3.1)
LYMPHOCYTES NFR BLD AUTO: 32.7 % (ref 19.6–45.3)
MCH RBC QN AUTO: 29.1 PG (ref 26.6–33)
MCHC RBC AUTO-ENTMCNC: 33.7 G/DL (ref 31.5–35.7)
MCV RBC AUTO: 86.3 FL (ref 79–97)
MONOCYTES # BLD AUTO: 0.6 10*3/MM3 (ref 0.1–0.9)
MONOCYTES NFR BLD AUTO: 9.6 % (ref 5–12)
NEUTROPHILS NFR BLD AUTO: 3.4 10*3/MM3 (ref 1.7–7)
NEUTROPHILS NFR BLD AUTO: 54.3 % (ref 42.7–76)
NRBC BLD AUTO-RTO: 0 /100 WBC (ref 0–0.2)
PLATELET # BLD AUTO: 290 10*3/MM3 (ref 140–450)
PMV BLD AUTO: 10.1 FL (ref 6–12)
POTASSIUM SERPL-SCNC: 3.9 MMOL/L (ref 3.5–5.2)
PROT SERPL-MCNC: 7 G/DL (ref 6–8.5)
RBC # BLD AUTO: 4.44 10*6/MM3 (ref 3.77–5.28)
SODIUM SERPL-SCNC: 142 MMOL/L (ref 136–145)
T4 FREE SERPL-MCNC: 1.58 NG/DL (ref 0.92–1.68)
TRIGL SERPL-MCNC: 130 MG/DL (ref 0–150)
TSH SERPL DL<=0.05 MIU/L-ACNC: 2.34 UIU/ML (ref 0.27–4.2)
VLDLC SERPL-MCNC: 23 MG/DL (ref 5–40)
WBC NRBC COR # BLD AUTO: 6.26 10*3/MM3 (ref 3.4–10.8)

## 2025-03-21 PROCEDURE — 84439 ASSAY OF FREE THYROXINE: CPT | Performed by: NURSE PRACTITIONER

## 2025-03-21 PROCEDURE — 80061 LIPID PANEL: CPT | Performed by: NURSE PRACTITIONER

## 2025-03-21 PROCEDURE — 82306 VITAMIN D 25 HYDROXY: CPT | Performed by: NURSE PRACTITIONER

## 2025-03-21 PROCEDURE — 83036 HEMOGLOBIN GLYCOSYLATED A1C: CPT | Performed by: NURSE PRACTITIONER

## 2025-03-21 PROCEDURE — 80050 GENERAL HEALTH PANEL: CPT | Performed by: NURSE PRACTITIONER

## 2025-03-21 RX ORDER — VALACYCLOVIR HYDROCHLORIDE 500 MG/1
500 TABLET, FILM COATED ORAL DAILY
Qty: 90 TABLET | Refills: 3 | Status: SHIPPED | OUTPATIENT
Start: 2025-03-21

## 2025-03-21 RX ORDER — METOPROLOL SUCCINATE 50 MG/1
50 TABLET, EXTENDED RELEASE ORAL 2 TIMES DAILY
Qty: 90 TABLET | Refills: 3 | Status: SHIPPED | OUTPATIENT
Start: 2025-03-21

## 2025-03-21 RX ORDER — AZELASTINE 1 MG/ML
2 SPRAY, METERED NASAL 2 TIMES DAILY
Qty: 30 ML | Refills: 12 | Status: SHIPPED | OUTPATIENT
Start: 2025-03-21

## 2025-03-21 RX ORDER — TOPIRAMATE 50 MG/1
50 TABLET, FILM COATED ORAL 2 TIMES DAILY
Qty: 180 TABLET | Refills: 3 | Status: SHIPPED | OUTPATIENT
Start: 2025-03-21

## 2025-03-21 RX ORDER — FOLIC ACID 1 MG/1
1 TABLET ORAL DAILY
Qty: 90 TABLET | Refills: 3 | Status: SHIPPED | OUTPATIENT
Start: 2025-03-21

## 2025-03-21 RX ORDER — CETIRIZINE HYDROCHLORIDE 10 MG/1
10 TABLET ORAL DAILY
Qty: 90 TABLET | Refills: 3 | Status: SHIPPED | OUTPATIENT
Start: 2025-03-21

## 2025-03-21 RX ORDER — CYANOCOBALAMIN 1000 UG/ML
1000 INJECTION, SOLUTION INTRAMUSCULAR; SUBCUTANEOUS
Qty: 3 ML | Refills: 3 | Status: SHIPPED | OUTPATIENT
Start: 2025-03-21 | End: 2025-03-24 | Stop reason: SDUPTHER

## 2025-03-21 RX ORDER — ALBUTEROL SULFATE 90 UG/1
1-2 INHALANT RESPIRATORY (INHALATION) EVERY 4 HOURS PRN
Qty: 8.5 G | Refills: 1 | Status: SHIPPED | OUTPATIENT
Start: 2025-03-21

## 2025-03-21 RX ORDER — ERGOCALCIFEROL 1.25 MG/1
50000 CAPSULE, LIQUID FILLED ORAL WEEKLY
Qty: 13 CAPSULE | Refills: 3 | Status: SHIPPED | OUTPATIENT
Start: 2025-03-21

## 2025-03-21 RX ORDER — ELETRIPTAN HYDROBROMIDE 40 MG/1
40 TABLET, FILM COATED ORAL
Qty: 8 TABLET | Refills: 11 | Status: SHIPPED | OUTPATIENT
Start: 2025-03-21

## 2025-03-21 RX ORDER — MONTELUKAST SODIUM 10 MG/1
10 TABLET ORAL NIGHTLY
Qty: 90 TABLET | Refills: 3 | Status: SHIPPED | OUTPATIENT
Start: 2025-03-21 | End: 2025-03-24 | Stop reason: SDUPTHER

## 2025-03-21 RX ORDER — COLESTIPOL HYDROCHLORIDE 1 G/1
1 TABLET ORAL 2 TIMES DAILY
Qty: 180 TABLET | Refills: 3 | Status: SHIPPED | OUTPATIENT
Start: 2025-03-21

## 2025-03-21 RX ORDER — FLUTICASONE PROPIONATE 50 MCG
2 SPRAY, SUSPENSION (ML) NASAL DAILY
Qty: 16 G | Refills: 3 | Status: SHIPPED | OUTPATIENT
Start: 2025-03-21

## 2025-03-24 ENCOUNTER — RESULTS FOLLOW-UP (OUTPATIENT)
Dept: FAMILY MEDICINE CLINIC | Facility: CLINIC | Age: 60
End: 2025-03-24
Payer: COMMERCIAL

## 2025-03-24 ENCOUNTER — TELEPHONE (OUTPATIENT)
Dept: FAMILY MEDICINE CLINIC | Facility: CLINIC | Age: 60
End: 2025-03-24
Payer: COMMERCIAL

## 2025-03-24 DIAGNOSIS — E04.1 THYROID NODULE: Primary | ICD-10-CM

## 2025-03-24 RX ORDER — MONTELUKAST SODIUM 10 MG/1
10 TABLET ORAL NIGHTLY
Qty: 90 TABLET | Refills: 3 | Status: SHIPPED | OUTPATIENT
Start: 2025-03-24 | End: 2025-03-24 | Stop reason: SDUPTHER

## 2025-03-24 RX ORDER — CYANOCOBALAMIN 1000 UG/ML
1000 INJECTION, SOLUTION INTRAMUSCULAR; SUBCUTANEOUS
Qty: 3 ML | Refills: 3 | Status: SHIPPED | OUTPATIENT
Start: 2025-03-24 | End: 2025-03-24 | Stop reason: SDUPTHER

## 2025-03-24 RX ORDER — CYANOCOBALAMIN 1000 UG/ML
1000 INJECTION, SOLUTION INTRAMUSCULAR; SUBCUTANEOUS
Qty: 3 ML | Refills: 3 | Status: SHIPPED | OUTPATIENT
Start: 2025-03-24

## 2025-03-24 RX ORDER — MONTELUKAST SODIUM 10 MG/1
10 TABLET ORAL NIGHTLY
Qty: 90 TABLET | Refills: 3 | Status: SHIPPED | OUTPATIENT
Start: 2025-03-24

## 2025-03-24 NOTE — TELEPHONE ENCOUNTER
Caller: [facility]    Relationship: Pharmacy    Best call back number: [phone]    Who are you requesting to speak with (clinical staff, provider,  specific staff member): CLINICAL       What was the call regarding: RECEIVED FAX FOR PRESCRIPTION MONTELUKAST AND B12 INJECTIONS HOWEVER THEY WERE NOT SIGNED AND THEY ARE UNSURE OF HOW MANY REFILLS TO PUT IN

## 2025-04-03 ENCOUNTER — TELEPHONE (OUTPATIENT)
Dept: ORTHOPEDIC SURGERY | Facility: CLINIC | Age: 60
End: 2025-04-03
Payer: COMMERCIAL

## 2025-04-03 ENCOUNTER — OFFICE VISIT (OUTPATIENT)
Dept: ORTHOPEDIC SURGERY | Facility: CLINIC | Age: 60
End: 2025-04-03
Payer: COMMERCIAL

## 2025-04-03 VITALS
BODY MASS INDEX: 37.73 KG/M2 | DIASTOLIC BLOOD PRESSURE: 84 MMHG | OXYGEN SATURATION: 95 % | HEIGHT: 64 IN | HEART RATE: 65 BPM | SYSTOLIC BLOOD PRESSURE: 132 MMHG | WEIGHT: 221 LBS

## 2025-04-03 DIAGNOSIS — Z96.652 HISTORY OF TOTAL KNEE ARTHROPLASTY, LEFT: ICD-10-CM

## 2025-04-03 DIAGNOSIS — M25.562 LEFT KNEE PAIN, UNSPECIFIED CHRONICITY: Primary | ICD-10-CM

## 2025-04-03 RX ORDER — DICLOFENAC SODIUM 75 MG/1
75 TABLET, DELAYED RELEASE ORAL 2 TIMES DAILY
Qty: 60 TABLET | Refills: 0 | Status: SHIPPED | OUTPATIENT
Start: 2025-04-03

## 2025-04-03 NOTE — TELEPHONE ENCOUNTER
Pt called states that [facility] pharm never got rx for anti inflammatory that was suppose to be sent in

## 2025-04-03 NOTE — PROGRESS NOTES
"Chief Complaint  Initial Evaluation of the Left Knee     Subjective      Pam Cade presents to [facility] for initial evaluation of the left knee.  She turned quick and twisted her knee on on 3/23/25 while at work while trying to help a patient when an alarm was going off.  She is here with a cane.  She saw work well and they did X ray and noted to come see ortho.  She has a history of a left total knee arthroplasty.  She notes her foot goes numb also.      Allergies   Allergen Reactions    Lortab [Hydrocodone-Acetaminophen] Itching     Pt states she takes tylenol at home        Social History     Socioeconomic History    Marital status:    Tobacco Use    Smoking status: Never    Smokeless tobacco: Never   Vaping Use    Vaping status: Never Used   Substance and Sexual Activity    Alcohol use: Yes     Comment: Current some day occasionally drinks, has been drinking for 6-10 years    Drug use: Never    Sexual activity: Defer        I reviewed the patient's chief complaint, history of present illness, review of systems, past medical history, surgical history, family history, social history, medications, and allergy list.     Review of Systems     Constitutional: Denies fevers, chills, weight loss  Cardiovascular: Denies chest pain, shortness of breath  Skin: Denies rashes, acute skin changes  Neurologic: Denies headache, loss of consciousness        Vital Signs:   /84   Pulse 65   Ht 162.6 cm (64\")   Wt 100 kg (221 lb)   SpO2 95%   BMI 37.93 kg/m²          Physical Exam  General: Alert. No acute distress    Ortho Exam        LEFT KNEE Flexion 95. Extension -3. Stable to varus/valgus stress. Stable to anterior/posterior drawer. Neurovascularly intact.  Positive EHL, FHL, HS and TA. Sensation intact to light touch all 5 nerves of the foot. Ambulates with Antalgic gait. Patella is well tracking. Calf supple, non-tender. Negative tenderness to the medial joint line. " Positive tenderness to the lateral joint line.  Knee Extensor Mechanism intact Mild swelling.           Procedures      Imaging Results (Most Recent)       None             Result Review :         XR Knee 3 View Left  Result Date: 3/25/2025  Narrative: XR KNEE 3 VW LEFT Date of Exam: 3/25/2025 2:47 PM EDT Indication: workinjury Comparison: Knee x-ray 8/27/2021 Findings: Osteopenia. Total knee arthroplasty. There is increased lucency along the lateral aspect the tibial component. No periprosthetic fracture.     Impression: Impression: Findings are suspicious for loosening of the tibial component laterally. Osteopenia. No acute fractures. Electronically Signed: Clemencia Sosa MD  3/25/2025 3:06 PM EDT  Workstation ID: [id number]             Assessment and Plan     Diagnoses and all orders for this visit:    1. Left knee pain, unspecified chronicity (Primary)    2. History of total knee arthroplasty, left        Discussed the treatment plan with the patient. I reviewed the X-rays that were obtained 3/25/25 with the patient.     Brace given.  Prescribed physical therapy.  Work note given.     Will X ray the left knee at the next visit as X ray that was taken has poor quality.       Call or return if worsening symptoms.    Follow Up     4-6 weeks to assess ROM of the left knee      Patient was given instructions and counseling regarding her condition or for health maintenance advice. Please see specific information pulled into the AVS if appropriate.     Scribed for Ghazala Terrell MD by Lillian Browne MA.  04/03/25   10:19 EDT    I have personally performed the services described in this document as scribed by the above individual and it is both accurate and complete. Ghazala Terrell MD 04/04/25

## 2025-04-04 ENCOUNTER — CLINICAL SUPPORT (OUTPATIENT)
Dept: FAMILY MEDICINE CLINIC | Facility: CLINIC | Age: 60
End: 2025-04-04
Payer: COMMERCIAL

## 2025-04-04 ENCOUNTER — TELEPHONE (OUTPATIENT)
Dept: FAMILY MEDICINE CLINIC | Facility: CLINIC | Age: 60
End: 2025-04-04

## 2025-04-04 VITALS — HEART RATE: 86 BPM | SYSTOLIC BLOOD PRESSURE: 125 MMHG | DIASTOLIC BLOOD PRESSURE: 86 MMHG

## 2025-04-04 DIAGNOSIS — I10 PRIMARY HYPERTENSION: Primary | ICD-10-CM

## 2025-04-09 ENCOUNTER — TELEPHONE (OUTPATIENT)
Dept: ORTHOPEDIC SURGERY | Facility: CLINIC | Age: 60
End: 2025-04-09
Payer: COMMERCIAL

## 2025-04-09 NOTE — TELEPHONE ENCOUNTER
CALLED PATIENT AND SHE SAID THAT HER WORK NOTE IS FINE AND THEY CAN DISCUSS UPDATED WORK NOTE AT HER NEXT APPT. AND SHE IS SCHEDULED WITH PT AT THE HOSPITAL.

## 2025-04-09 NOTE — TELEPHONE ENCOUNTER
Caller: Pam Cade    Relationship to patient: Self    Best call back number: [phone]    Patient is needing: PATIENT WOULD LIKE TO SPEAK TO SOMEONE CONCERNING WHEN HER PHYSICAL THERAPY IS TO START AND WORK WILL WANT HER TO COME BACK

## 2025-04-14 ENCOUNTER — HOSPITAL ENCOUNTER (OUTPATIENT)
Dept: ULTRASOUND IMAGING | Facility: HOSPITAL | Age: 60
Discharge: HOME | End: 2025-04-14
Admitting: NURSE PRACTITIONER
Payer: COMMERCIAL

## 2025-04-14 PROCEDURE — 76536 US EXAM OF HEAD AND NECK: CPT

## 2025-04-15 ENCOUNTER — HOSPITAL ENCOUNTER (OUTPATIENT)
Facility: HOSPITAL | Age: 60
Setting detail: THERAPIES SERIES
Discharge: HOME | End: 2025-04-15
Payer: COMMERCIAL

## 2025-04-15 DIAGNOSIS — M25.662 DECREASED RANGE OF MOTION OF LEFT KNEE: ICD-10-CM

## 2025-04-15 DIAGNOSIS — M25.562 ACUTE PAIN OF LEFT KNEE: Primary | ICD-10-CM

## 2025-04-15 PROCEDURE — 97110 THERAPEUTIC EXERCISES: CPT | Performed by: PHYSICAL THERAPIST

## 2025-04-15 PROCEDURE — 97161 PT EVAL LOW COMPLEX 20 MIN: CPT | Performed by: PHYSICAL THERAPIST

## 2025-04-21 ENCOUNTER — HOSPITAL ENCOUNTER (OUTPATIENT)
Facility: HOSPITAL | Age: 60
Setting detail: THERAPIES SERIES
Discharge: HOME | End: 2025-04-21
Payer: COMMERCIAL

## 2025-04-21 DIAGNOSIS — M25.562 ACUTE PAIN OF LEFT KNEE: Primary | ICD-10-CM

## 2025-04-21 DIAGNOSIS — M25.662 DECREASED RANGE OF MOTION OF LEFT KNEE: ICD-10-CM

## 2025-04-21 PROCEDURE — 97110 THERAPEUTIC EXERCISES: CPT | Performed by: PHYSICAL THERAPIST

## 2025-04-21 PROCEDURE — 97530 THERAPEUTIC ACTIVITIES: CPT | Performed by: PHYSICAL THERAPIST

## 2025-04-21 NOTE — THERAPY TREATMENT NOTE
Outpatient Physical Therapy Ortho Treatment Note  [facility]     Patient Name: Pam Cade  : 1965  MRN: [id number]  Today's Date: 2025      Visit Date: 2025    Visit Dx:    ICD-10-CM ICD-9-CM   1. Acute pain of left knee  M25.562 719.46   2. Decreased range of motion of left knee  M25.662 719.56       Patient Active Problem List   Diagnosis    Allergic rhinitis due to allergen    Ankle pain    Arthritis    B12 deficiency    Bladder disorder    Cramps of lower extremity    Gastroesophageal reflux disease without esophagitis    Folic acid deficiency    Foot pain, left    Hypertension    Iron deficiency    Kidney problem    Ulcerative lesion    Urinary urgency    Nocturia    Increased frequency of urination    Neck pain    Acute pain of right shoulder    Radiculitis, cervical    Chronic headaches    Epigastric pain    Mixed hyperlipidemia    Abnormal ECG    Abnormal nuclear stress test    Cardiomyopathy, dilated    Anemia    Anxiety    Mild episode of recurrent major depressive disorder    Panic attack    Nail fungus    Tachycardia    Chronic systolic heart failure    Thyroid nodule    Diarrhea    Vision changes    Stabbing headache    Lightheaded    Hematuria    Dysuria    Acute cystitis with hematuria    Eustachian tube disorder, bilateral    Herpes    Recurrent cold sores    Class 3 severe obesity due to excess calories with serious comorbidity and body mass index (BMI) of 40.0 to 44.9 in adult    Impaired fasting glucose    Congestion of nasal sinus    Annual physical exam    Palpitations    Allergic rhinitis    Pain of left lower extremity    Left leg swelling    Vitamin D deficiency    Fatigue        Past Medical History:   Diagnosis Date    Allergic rhinitis due to allergen 2021    Ankle pain     Arthritis     B12 deficiency 2019    Bladder disorder     Condition not found     Ulcer    Cramps of lower extremity     Folic acid deficiency 2019    Foot pain, left 2018     High cholesterol     Hypertension     Kidney problem         Past Surgical History:   Procedure Laterality Date    CARDIAC CATHETERIZATION N/A 3/3/2022    Procedure: Left Heart Cath - right radial;  Surgeon: CATHERINE Hall MD;  Location: [facility] CATH INVASIVE LOC[facility];  Service: Cardiology;  Laterality: N/A;    HIATAL HERNIA REPAIR  02/2019    HYSTERECTOMY      INCONTINENCE SURGERY      LAPAROSCOPIC CHOLECYSTECTOMY  02/2019    OTHER SURGICAL HISTORY      Artificial Joints/Limbs    OTHER SURGICAL HISTORY      Joint surgery    REVISION / REMOVAL NEUROSTIMULATOR      TOTAL KNEE ARTHROPLASTY Left                         PT Assessment/Plan       Row Name 04/21/25 1600          PT Assessment    Functional Limitations Impaired gait;Impaired locomotion;Performance in self-care ADL;Performance in work activities;Limitations in functional capacity and performance  -RO     Impairments Gait;Endurance;Pain;Muscle strength;Joint mobility;Locomotion;Range of motion  -RO     Assessment Comments Patient walking with straight cane and still has difficulty with left knee flexion  -RO     Rehab Potential Good  -RO     Patient/caregiver participated in establishment of treatment plan and goals Yes  -RO     Patient would benefit from skilled therapy intervention Yes  -RO        PT Plan    PT Plan Comments continue per POC  -RO               User Key  (r) = Recorded By, (t) = Taken By, (c) = Cosigned By      Initials Name Provider Type    RO Anni Gordon, PT Physical Therapist                       OP Exercises       Row Name 04/21/25 1600             Subjective    Subjective Comments Patient reports compliance with home exercises  -RO         Subjective Pain    Able to rate subjective pain? yes  -RO      Pre-Treatment Pain Level 5  -RO      Post-Treatment Pain Level 5  -RO      Subjective Pain Comment left knee  -RO         Total Minutes    33555 - PT Therapeutic Exercise Minutes 30  -RO      08620 - PT Therapeutic Activity  Minutes 8  -RO         Exercise 1    Exercise Name 1 SLR  -RO      Cueing 1 Verbal;Tactile  -RO      Reps 1 x10 L  -RO         Exercise 2    Exercise Name 2 LAQ  -RO      Cueing 2 Verbal;Demo  -RO      Sets 2 3lbs  -RO      Reps 2 x10 L  -RO         Exercise 3    Exercise Name 3 seated hamstring curls  -RO      Cueing 3 Verbal;Tactile  -RO      Sets 3 green band  -RO      Reps 3 x10 L  -RO         Exercise 4    Exercise Name 4 bolster bridges  -RO      Cueing 4 Verbal  -RO      Reps 4 x20  -RO         Exercise 5    Exercise Name 5 hooklying hip abduction  -RO      Cueing 5 Verbal;Tactile  -RO      Sets 5 green band  -RO      Reps 5 x10  -RO         Exercise 6    Exercise Name 6 hooklying hip add/ball  -RO      Cueing 6 Verbal;Tactile  -RO      Reps 6 x10  -RO         Exercise 7    Exercise Name 7 SAQ  -RO      Cueing 7 Verbal  -RO      Reps 7 *unable to tolerate  -RO         Exercise 8    Exercise Name 8 Sci-bike  -RO      Cueing 8 Verbal  -RO      Sets 8 seat  -RO      Time 8 x6 min  -RO         Exercise 9    Exercise Name 9 slant board calf stretch  -RO      Cueing 9 Verbal;Demo  -RO      Reps 9 3 x20 sec hold  -RO         Exercise 10    Exercise Name 10 step ups-next session  -RO                User Key  (r) = Recorded By, (t) = Taken By, (c) = Cosigned By      Initials Name Provider Type    Anni Luna, PT Physical Therapist                                                    Time Calculation:   Timed Charges  09411 - PT Therapeutic Exercise Minutes: 30  42462 - PT Therapeutic Activity Minutes: 8  Total Minutes  Timed Charges Total Minutes: 38   Total Minutes: 38                Anni Gordon PT  4/21/2025

## 2025-04-24 ENCOUNTER — OFFICE VISIT (OUTPATIENT)
Dept: ORTHOPEDIC SURGERY | Facility: CLINIC | Age: 60
End: 2025-04-24
Payer: COMMERCIAL

## 2025-04-24 VITALS
OXYGEN SATURATION: 96 % | DIASTOLIC BLOOD PRESSURE: 79 MMHG | BODY MASS INDEX: 37.73 KG/M2 | WEIGHT: 221 LBS | SYSTOLIC BLOOD PRESSURE: 126 MMHG | HEART RATE: 75 BPM | HEIGHT: 64 IN

## 2025-04-24 DIAGNOSIS — Z96.652 HISTORY OF TOTAL KNEE ARTHROPLASTY, LEFT: ICD-10-CM

## 2025-04-24 DIAGNOSIS — S89.92XS LEFT KNEE INJURY, SEQUELA: ICD-10-CM

## 2025-04-24 DIAGNOSIS — M25.562 LEFT KNEE PAIN, UNSPECIFIED CHRONICITY: Primary | ICD-10-CM

## 2025-04-24 NOTE — PROGRESS NOTES
"Chief Complaint  Follow-up of the Left Knee     Subjective      Pam Cade presents to [facility] for follow up of the left knee.  She was seen in office on 4/3/25 and given a left knee brace and prescribed physical therapy.  She turned quick and twisted her knee on on 3/23/25 while at work while trying to help a patient when an alarm was going off. She has a history of a left total knee arthroplasty.  She has had 2 therapy sessions.  Working on strengthening.  The brace is painful and PT discussed a knee sleeve.      Allergies   Allergen Reactions    Lortab [Hydrocodone-Acetaminophen] Itching     Pt states she takes tylenol at home        Social History     Socioeconomic History    Marital status:    Tobacco Use    Smoking status: Never    Smokeless tobacco: Never   Vaping Use    Vaping status: Never Used   Substance and Sexual Activity    Alcohol use: Yes     Comment: Current some day occasionally drinks, has been drinking for 6-10 years    Drug use: Never    Sexual activity: Defer        I reviewed the patient's chief complaint, history of present illness, review of systems, past medical history, surgical history, family history, social history, medications, and allergy list.     Review of Systems     Constitutional: Denies fevers, chills, weight loss  Cardiovascular: Denies chest pain, shortness of breath  Skin: Denies rashes, acute skin changes  Neurologic: Denies headache, loss of consciousness        Vital Signs:   /79   Pulse 75   Ht 162.6 cm (64\")   Wt 100 kg (221 lb)   SpO2 96%   BMI 37.93 kg/m²          Physical Exam  General: Alert. No acute distress    Ortho Exam        LEFT KNEE Flexion 100. Extension 0. Stable to varus/valgus stress. Stable to anterior/posterior drawer. Neurovascularly intact. Positive EHL, FHL, HS and TA. Sensation intact to light touch all 5 nerves of the foot. Ambulates with Antalgic gait. Patella is well tracking. Calf supple, " non-tender. . Good strength to hamstrings, quadriceps, dorsiflexors, and plantar flexors.  Knee Extensor Mechanism intact Mild swelling.        Procedures      Imaging Results (Most Recent)       Procedure Component Value Units Date/Time    XR Knee 3 View Left [[id number]] Resulted: 04/24/25 1459     Updated: 04/24/25 1502             Result Review :     X-Ray Report:  Left knee X-Ray  Indication: Evaluation of the left knee  AP/Lateral and [place] view(s)  Findings: Intact left total knee arthroplasty.  No signs of loosening, subsidence or periprosthetic fracture.   Prior studies available for comparison: yes              Assessment and Plan     Diagnoses and all orders for this visit:    1. Left knee pain, unspecified chronicity (Primary)  -     XR Knee 3 View Left    2. History of total knee arthroplasty, left    3. Left knee injury, sequela        Discussed the treatment plan with the patient. I reviewed the X-rays that were obtained today with the patient.     Prescribed anti inflammatory topical cream.  Work note given.       Call or return if worsening symptoms.    Follow Up     4-6 weeks to assess progress.        Patient was given instructions and counseling regarding her condition or for health maintenance advice. Please see specific information pulled into the AVS if appropriate.     Scribed for Ghazala Terrell MD by Lillian Browne MA.  04/24/25   15:08 EDT    I have personally performed the services described in this document as scribed by the above individual and it is both accurate and complete. Ghazala Terrell MD 04/24/25

## 2025-04-25 ENCOUNTER — HOSPITAL ENCOUNTER (OUTPATIENT)
Facility: HOSPITAL | Age: 60
Setting detail: THERAPIES SERIES
Discharge: HOME | End: 2025-04-25
Payer: COMMERCIAL

## 2025-04-25 DIAGNOSIS — M25.662 DECREASED RANGE OF MOTION OF LEFT KNEE: ICD-10-CM

## 2025-04-25 DIAGNOSIS — M25.562 ACUTE PAIN OF LEFT KNEE: Primary | ICD-10-CM

## 2025-04-25 PROCEDURE — 97110 THERAPEUTIC EXERCISES: CPT | Performed by: PHYSICAL THERAPIST

## 2025-04-25 PROCEDURE — 97530 THERAPEUTIC ACTIVITIES: CPT | Performed by: PHYSICAL THERAPIST

## 2025-04-25 NOTE — THERAPY TREATMENT NOTE
Outpatient Physical Therapy Ortho Treatment Note  [facility]     Patient Name: Pam Cade  : 1965  MRN: [id number]  Today's Date: 2025      Visit Date: 2025    Visit Dx:    ICD-10-CM ICD-9-CM   1. Acute pain of left knee  M25.562 719.46   2. Decreased range of motion of left knee  M25.662 719.56       Patient Active Problem List   Diagnosis    Allergic rhinitis due to allergen    Ankle pain    Arthritis    B12 deficiency    Bladder disorder    Cramps of lower extremity    Gastroesophageal reflux disease without esophagitis    Folic acid deficiency    Foot pain, left    Hypertension    Iron deficiency    Kidney problem    Ulcerative lesion    Urinary urgency    Nocturia    Increased frequency of urination    Neck pain    Acute pain of right shoulder    Radiculitis, cervical    Chronic headaches    Epigastric pain    Mixed hyperlipidemia    Abnormal ECG    Abnormal nuclear stress test    Cardiomyopathy, dilated    Anemia    Anxiety    Mild episode of recurrent major depressive disorder    Panic attack    Nail fungus    Tachycardia    Chronic systolic heart failure    Thyroid nodule    Diarrhea    Vision changes    Stabbing headache    Lightheaded    Hematuria    Dysuria    Acute cystitis with hematuria    Eustachian tube disorder, bilateral    Herpes    Recurrent cold sores    Class 3 severe obesity due to excess calories with serious comorbidity and body mass index (BMI) of 40.0 to 44.9 in adult    Impaired fasting glucose    Congestion of nasal sinus    Annual physical exam    Palpitations    Allergic rhinitis    Pain of left lower extremity    Left leg swelling    Vitamin D deficiency    Fatigue        Past Medical History:   Diagnosis Date    Allergic rhinitis due to allergen 2021    Ankle pain     Arthritis     B12 deficiency 2019    Bladder disorder     Condition not found     Ulcer    Cramps of lower extremity     Folic acid deficiency 2019    Foot pain, left 2018     High cholesterol     Hypertension     Kidney problem         Past Surgical History:   Procedure Laterality Date    CARDIAC CATHETERIZATION N/A 3/3/2022    Procedure: Left Heart Cath - right radial;  Surgeon: CATHERINE Hall MD;  Location: [facility] CATH INVASIVE LOCATION;  Service: Cardiology;  Laterality: N/A;    HIATAL HERNIA REPAIR  02/2019    HYSTERECTOMY      INCONTINENCE SURGERY      LAPAROSCOPIC CHOLECYSTECTOMY  02/2019    OTHER SURGICAL HISTORY      Artificial Joints/Limbs    OTHER SURGICAL HISTORY      Joint surgery    REVISION / REMOVAL NEUROSTIMULATOR      TOTAL KNEE ARTHROPLASTY Left                         PT Assessment/Plan       Row Name 04/25/25 1445          PT Assessment    Functional Limitations Impaired gait;Impaired locomotion;Performance in self-care ADL;Performance in work activities;Limitations in functional capacity and performance  -RO     Impairments Gait;Endurance;Pain;Muscle strength;Joint mobility;Locomotion;Range of motion  -RO     Assessment Comments Patient able to tolerate progression of strengthening with increase reps and step ups within minimal increase in soreness  -RO     Rehab Potential Good  -RO     Patient/caregiver participated in establishment of treatment plan and goals Yes  -RO     Patient would benefit from skilled therapy intervention Yes  -RO        PT Plan    PT Plan Comments continue per POC  -RO               User Key  (r) = Recorded By, (t) = Taken By, (c) = Cosigned By      Initials Name Provider Type    RO Anni Gordon, PT Physical Therapist                       OP Exercises       Row Name 04/25/25 1444             Subjective    Subjective Comments Patient reports left knee a little better and not using straight cane on level ground.  -RO         Subjective Pain    Able to rate subjective pain? yes  -RO      Pre-Treatment Pain Level 3  -RO      Post-Treatment Pain Level 3  -RO      Subjective Pain Comment left knee  -RO         Total Minutes    21257 - PT  "Therapeutic Exercise Minutes 28  -RO      13496 - PT Therapeutic Activity Minutes 10  -RO         Exercise 1    Exercise Name 1 SLR  -RO      Cueing 1 Verbal;Tactile  -RO      Reps 1 x10 L  -RO         Exercise 2    Exercise Name 2 LAQ  -RO      Cueing 2 Verbal;Demo  -RO      Sets 2 3lbs  -RO      Reps 2 x15 L  -RO         Exercise 3    Exercise Name 3 seated hamstring curls  -RO      Cueing 3 Verbal;Tactile  -RO      Sets 3 blue band  -RO      Reps 3 x15 L  -RO         Exercise 4    Exercise Name 4 bolster bridges  -RO      Cueing 4 Verbal  -RO      Reps 4 x20  -RO         Exercise 5    Exercise Name 5 hooklying hip abduction  -RO      Cueing 5 Verbal;Tactile  -RO      Sets 5 black band  -RO      Reps 5 x10  -RO         Exercise 6    Exercise Name 6 hooklying hip add/ball  -RO      Cueing 6 Verbal;Tactile  -RO      Reps 6 x15  -RO         Exercise 7    Exercise Name 7 slant board calf stretch  -RO      Cueing 7 Verbal;Demo  -RO      Reps 7 x5  -RO         Exercise 8    Exercise Name 8 Sci-bike  -RO      Cueing 8 Verbal  -RO      Sets 8 --  -RO      Reps 8 \"rocking\"  -RO      Time 8 x6 min  -RO         Exercise 9    Exercise Name 9 step ups  -RO      Cueing 9 Verbal;Demo  -RO      Sets 9 aerobic step level 2 and rail  -RO      Reps 9 x10 forward  -RO      Time 9 x10 laterally  -RO         Exercise 10    Exercise Name 10 --  -RO                User Key  (r) = Recorded By, (t) = Taken By, (c) = Cosigned By      Initials Name Provider Type    RO Anni Gordon, PT Physical Therapist                                                    Time Calculation:   Timed Charges  07505 - PT Therapeutic Exercise Minutes: 28  56214 - PT Therapeutic Activity Minutes: 10  Total Minutes  Timed Charges Total Minutes: 38   Total Minutes: 38                Anni Gordon PT  4/25/2025     "

## 2025-04-29 ENCOUNTER — APPOINTMENT (OUTPATIENT)
Facility: HOSPITAL | Age: 60
End: 2025-04-29
Payer: COMMERCIAL

## 2025-05-07 ENCOUNTER — HOSPITAL ENCOUNTER (OUTPATIENT)
Facility: HOSPITAL | Age: 60
Setting detail: THERAPIES SERIES
Discharge: HOME | End: 2025-05-07
Payer: COMMERCIAL

## 2025-05-07 DIAGNOSIS — M25.562 ACUTE PAIN OF LEFT KNEE: Primary | ICD-10-CM

## 2025-05-07 DIAGNOSIS — M25.662 DECREASED RANGE OF MOTION OF LEFT KNEE: ICD-10-CM

## 2025-05-07 PROCEDURE — 97110 THERAPEUTIC EXERCISES: CPT | Performed by: PHYSICAL THERAPIST

## 2025-05-07 NOTE — THERAPY TREATMENT NOTE
Outpatient Physical Therapy Ortho Treatment Note  ERIC Conway     Patient Name: Pam Cade  : 1965  MRN: [id number]  Today's Date: 2025      Visit Date: 2025    Visit Dx:    ICD-10-CM ICD-9-CM   1. Acute pain of left knee  M25.562 719.46   2. Decreased range of motion of left knee  M25.662 719.56       Patient Active Problem List   Diagnosis    Allergic rhinitis due to allergen    Ankle pain    Arthritis    B12 deficiency    Bladder disorder    Cramps of lower extremity    Gastroesophageal reflux disease without esophagitis    Folic acid deficiency    Foot pain, left    Hypertension    Iron deficiency    Kidney problem    Ulcerative lesion    Urinary urgency    Nocturia    Increased frequency of urination    Neck pain    Acute pain of right shoulder    Radiculitis, cervical    Chronic headaches    Epigastric pain    Mixed hyperlipidemia    Abnormal ECG    Abnormal nuclear stress test    Cardiomyopathy, dilated    Anemia    Anxiety    Mild episode of recurrent major depressive disorder    Panic attack    Nail fungus    Tachycardia    Chronic systolic heart failure    Thyroid nodule    Diarrhea    Vision changes    Stabbing headache    Lightheaded    Hematuria    Dysuria    Acute cystitis with hematuria    Eustachian tube disorder, bilateral    Herpes    Recurrent cold sores    Class 3 severe obesity due to excess calories with serious comorbidity and body mass index (BMI) of 40.0 to 44.9 in adult    Impaired fasting glucose    Congestion of nasal sinus    Annual physical exam    Palpitations    Allergic rhinitis    Pain of left lower extremity    Left leg swelling    Vitamin D deficiency    Fatigue        Past Medical History:   Diagnosis Date    Allergic rhinitis due to allergen 2021    Ankle pain     Arthritis     B12 deficiency 2019    Bladder disorder     Condition not found     Ulcer    Cramps of lower extremity     Folic acid deficiency 2019    Foot pain, left 2018     "High cholesterol     Hypertension     Kidney problem         Past Surgical History:   Procedure Laterality Date    CARDIAC CATHETERIZATION N/A 3/3/2022    Procedure: Left Heart Cath - right radial;  Surgeon: CATHERINE Hall MD;  Location: [facility] CATH INVASIVE LOCATION;  Service: Cardiology;  Laterality: N/A;    HIATAL HERNIA REPAIR  02/2019    HYSTERECTOMY      INCONTINENCE SURGERY      LAPAROSCOPIC CHOLECYSTECTOMY  02/2019    OTHER SURGICAL HISTORY      Artificial Joints/Limbs    OTHER SURGICAL HISTORY      Joint surgery    REVISION / REMOVAL NEUROSTIMULATOR      TOTAL KNEE ARTHROPLASTY Left                         PT Assessment/Plan       Row Name 05/07/25 1600          PT Assessment    Functional Limitations Impaired gait;Impaired locomotion;Performance in self-care ADL;Performance in work activities;Limitations in functional capacity and performance  -RO     Impairments Gait;Endurance;Pain;Muscle strength;Joint mobility;Locomotion;Range of motion  -RO     Assessment Comments Patient going to PCP this friday and will get left foot checked out.  -RO     Rehab Potential Good  -RO     Patient/caregiver participated in establishment of treatment plan and goals Yes  -RO     Patient would benefit from skilled therapy intervention Yes  -RO        PT Plan    PT Plan Comments continue per POC  -RO               User Key  (r) = Recorded By, (t) = Taken By, (c) = Cosigned By      Initials Name Provider Type    RO Anni Gordon, PT Physical Therapist                       OP Exercises       Row Name 05/07/25 1600             Subjective    Subjective Comments Patient reports left knee feeling better, but still has \"twinge\" of pain with sit to stand transfers. Patient states over weekend top of left foot started hurting/swelling/mild warmth. Patient reports no known etiology of left foot pain.  -RO         Subjective Pain    Able to rate subjective pain? yes  -RO      Pre-Treatment Pain Level 6  -RO      Post-Treatment " Pain Level 6  -RO      Subjective Pain Comment left dorsal foot  -RO         Total Minutes    21542 - PT Therapeutic Exercise Minutes 28  -RO         Exercise 1    Exercise Name 1 hooklying hip add/ball squeeze  -RO      Cueing 1 Verbal;Tactile  -RO      Reps 1 x20  -RO         Exercise 2    Exercise Name 2 LAQ  -RO      Cueing 2 Verbal;Demo  -RO      Sets 2 3lbs  -RO      Reps 2 x20 L  -RO         Exercise 3    Exercise Name 3 seated hamstring curls  -RO      Cueing 3 Verbal;Tactile  -RO      Sets 3 black band  -RO      Reps 3 x20 L  -RO         Exercise 4    Exercise Name 4 bolster bridges  -RO      Cueing 4 Verbal  -RO      Reps 4 x20  -RO         Exercise 5    Exercise Name 5 hooklying hip abduction  -RO      Cueing 5 Verbal;Tactile  -RO      Sets 5 black band  -RO      Reps 5 x10  -RO         Exercise 6    Exercise Name 6 hooklying hip add/ball  -RO      Cueing 6 Verbal;Tactile  -RO      Reps 6 x20  -RO         Exercise 7    Exercise Name 7 slant board calf stretch  -RO      Cueing 7 Verbal;Demo  -RO      Reps 7 not today  -RO         Exercise 8    Exercise Name 8 Sci-bike  -RO      Cueing 8 Verbal  -RO      Reps 8 full circles  -RO      Time 8 x5 min  -RO         Exercise 9    Exercise Name 9 step ups  -RO      Cueing 9 Verbal;Demo  -RO      Sets 9 aerobic step level 2 and rail  -RO      Reps 9 not today  -RO      Time 9 not today  -RO                User Key  (r) = Recorded By, (t) = Taken By, (c) = Cosigned By      Initials Name Provider Type    RO Anni Gordon, PT Physical Therapist                                                    Time Calculation:   Timed Charges  49507 - PT Therapeutic Exercise Minutes: 28  Total Minutes  Timed Charges Total Minutes: 28   Total Minutes: 28                Anni Gordon PT  5/7/2025

## 2025-05-09 ENCOUNTER — TELEPHONE (OUTPATIENT)
Dept: FAMILY MEDICINE CLINIC | Facility: CLINIC | Age: 60
End: 2025-05-09

## 2025-05-09 ENCOUNTER — CLINICAL SUPPORT (OUTPATIENT)
Dept: FAMILY MEDICINE CLINIC | Facility: CLINIC | Age: 60
End: 2025-05-09
Payer: COMMERCIAL

## 2025-05-09 VITALS — DIASTOLIC BLOOD PRESSURE: 88 MMHG | HEART RATE: 71 BPM | SYSTOLIC BLOOD PRESSURE: 131 MMHG

## 2025-05-09 DIAGNOSIS — I10 PRIMARY HYPERTENSION: Primary | ICD-10-CM

## 2025-05-13 ENCOUNTER — HOSPITAL ENCOUNTER (OUTPATIENT)
Facility: HOSPITAL | Age: 60
Setting detail: THERAPIES SERIES
Discharge: HOME | End: 2025-05-13
Payer: COMMERCIAL

## 2025-05-13 DIAGNOSIS — M25.662 DECREASED RANGE OF MOTION OF LEFT KNEE: ICD-10-CM

## 2025-05-13 DIAGNOSIS — M25.562 ACUTE PAIN OF LEFT KNEE: Primary | ICD-10-CM

## 2025-05-13 PROCEDURE — 97110 THERAPEUTIC EXERCISES: CPT | Performed by: PHYSICAL THERAPIST

## 2025-05-13 NOTE — THERAPY TREATMENT NOTE
Outpatient Physical Therapy Ortho Treatment Note  ERIC [facility]     Patient Name: Pam Cade  : 1965  MRN: [id number]  Today's Date: 2025      Visit Date: 2025    Visit Dx:    ICD-10-CM ICD-9-CM   1. Acute pain of left knee  M25.562 719.46   2. Decreased range of motion of left knee  M25.662 719.56       Patient Active Problem List   Diagnosis    Allergic rhinitis due to allergen    Ankle pain    Arthritis    B12 deficiency    Bladder disorder    Cramps of lower extremity    Gastroesophageal reflux disease without esophagitis    Folic acid deficiency    Foot pain, left    Hypertension    Iron deficiency    Kidney problem    Ulcerative lesion    Urinary urgency    Nocturia    Increased frequency of urination    Neck pain    Acute pain of right shoulder    Radiculitis, cervical    Chronic headaches    Epigastric pain    Mixed hyperlipidemia    Abnormal ECG    Abnormal nuclear stress test    Cardiomyopathy, dilated    Anemia    Anxiety    Mild episode of recurrent major depressive disorder    Panic attack    Nail fungus    Tachycardia    Chronic systolic heart failure    Thyroid nodule    Diarrhea    Vision changes    Stabbing headache    Lightheaded    Hematuria    Dysuria    Acute cystitis with hematuria    Eustachian tube disorder, bilateral    Herpes    Recurrent cold sores    Class 3 severe obesity due to excess calories with serious comorbidity and body mass index (BMI) of 40.0 to 44.9 in adult    Impaired fasting glucose    Congestion of nasal sinus    Annual physical exam    Palpitations    Allergic rhinitis    Pain of left lower extremity    Left leg swelling    Vitamin D deficiency    Fatigue        Past Medical History:   Diagnosis Date    Allergic rhinitis due to allergen 2021    Ankle pain     Arthritis     B12 deficiency 2019    Bladder disorder     Condition not found     Ulcer    Cramps of lower extremity     Folic acid deficiency 2019    Foot pain, left 2018     High cholesterol     Hypertension     Kidney problem         Past Surgical History:   Procedure Laterality Date    CARDIAC CATHETERIZATION N/A 3/3/2022    Procedure: Left Heart Cath - right radial;  Surgeon: CATHERINE Hall MD;  Location: [facility] CATH INVASIVE LOCATION;  Service: Cardiology;  Laterality: N/A;    HIATAL HERNIA REPAIR  02/2019    HYSTERECTOMY      INCONTINENCE SURGERY      LAPAROSCOPIC CHOLECYSTECTOMY  02/2019    OTHER SURGICAL HISTORY      Artificial Joints/Limbs    OTHER SURGICAL HISTORY      Joint surgery    REVISION / REMOVAL NEUROSTIMULATOR      TOTAL KNEE ARTHROPLASTY Left                         PT Assessment/Plan       Row Name 05/13/25 1550          PT Assessment    Functional Limitations Impaired gait;Impaired locomotion;Performance in self-care ADL;Performance in work activities;Limitations in functional capacity and performance  -RO     Impairments Gait;Endurance;Pain;Muscle strength;Joint mobility;Locomotion;Range of motion  -RO     Assessment Comments Patient tolerated session without increase pain/symptoms  -RO     Rehab Potential Good  -RO     Patient/caregiver participated in establishment of treatment plan and goals Yes  -RO     Patient would benefit from skilled therapy intervention Yes  -RO        PT Plan    PT Plan Comments continue per POC  -RO               User Key  (r) = Recorded By, (t) = Taken By, (c) = Cosigned By      Initials Name Provider Type    RO Anni Gordon, PT Physical Therapist                       OP Exercises       Row Name 05/13/25 3961             Subjective    Subjective Comments Patient states left dorsal foot is feeling better and less edema.  -RO         Subjective Pain    Able to rate subjective pain? yes  -RO      Pre-Treatment Pain Level 3  -RO      Post-Treatment Pain Level 3  -RO      Subjective Pain Comment left knee  -RO         Total Minutes    29574 - PT Therapeutic Exercise Minutes 30  -RO         Exercise 1    Exercise Name 1 hooklying  hip add/ball squeeze  -RO      Cueing 1 Verbal;Tactile  -RO      Reps 1 x20  -RO         Exercise 2    Exercise Name 2 LAQ  -RO      Cueing 2 Verbal;Demo  -RO      Sets 2 3lbs  -RO      Reps 2 x20 L  -RO         Exercise 3    Exercise Name 3 seated hamstring curls  -RO      Cueing 3 Verbal;Tactile  -RO      Sets 3 black band  -RO      Reps 3 x20 L  -RO         Exercise 4    Exercise Name 4 bolster bridges  -RO      Cueing 4 Verbal  -RO      Reps 4 x20  -RO         Exercise 5    Exercise Name 5 hooklying hip abduction  -RO      Cueing 5 Verbal;Tactile  -RO      Sets 5 black band  -RO      Reps 5 x20  -RO         Exercise 6    Exercise Name 6 Sci-fit Bike  -RO      Cueing 6 Verbal  -RO      Reps 6 full circles  -RO      Time 6 x5 min  -RO         Exercise 7    Exercise Name 7 slant board calf stretch  -RO      Cueing 7 Verbal;Demo  -RO      Reps 7 not today  -RO         Exercise 8    Exercise Name 8 Sci-bike  -RO      Cueing 8 Verbal  -RO      Reps 8 full circles  -RO      Time 8 x5 min  -RO         Exercise 9    Exercise Name 9 --  -RO      Cueing 9 --  -RO      Sets 9 --  -RO      Reps 9 --  -RO      Time 9 --  -RO                User Key  (r) = Recorded By, (t) = Taken By, (c) = Cosigned By      Initials Name Provider Type    RO Anni Gordon, PT Physical Therapist                                                    Time Calculation:   Timed Charges  37874 - PT Therapeutic Exercise Minutes: 30  Total Minutes  Timed Charges Total Minutes: 30   Total Minutes: 30                Anni Gordon PT  5/13/2025

## 2025-05-14 ENCOUNTER — APPOINTMENT (OUTPATIENT)
Facility: HOSPITAL | Age: 60
End: 2025-05-14
Payer: COMMERCIAL

## 2025-05-20 ENCOUNTER — HOSPITAL ENCOUNTER (OUTPATIENT)
Facility: HOSPITAL | Age: 60
Setting detail: THERAPIES SERIES
Discharge: HOME | End: 2025-05-20
Payer: COMMERCIAL

## 2025-05-20 DIAGNOSIS — M25.662 DECREASED RANGE OF MOTION OF LEFT KNEE: ICD-10-CM

## 2025-05-20 DIAGNOSIS — M25.562 ACUTE PAIN OF LEFT KNEE: Primary | ICD-10-CM

## 2025-05-20 PROCEDURE — 97110 THERAPEUTIC EXERCISES: CPT | Performed by: PHYSICAL THERAPIST

## 2025-05-20 NOTE — THERAPY PROGRESS REPORT/RE-CERT
Physical Therapy Progress Note  [address] [place] [address]    Patient: Pam Cade   : 1965  Referring practitioner: Ghazala Terrell MD  Date of Initial Visit: Type: THERAPY  Episode: L knee pain  Today's Date: 2025  Patient seen for 6 sessions           Subjective   Pam Cade reports: left knee pain 2/10  Subjective Questionnaire: LEFS: 65/80=19% limitation improved from initial score 56/80 or 30% limited       Objective          Active Range of Motion   Left Knee   Flexion: 105 degrees   Extension: 0 degrees     Passive Range of Motion   Left Knee   Flexion: 110 degrees   Extension: 0 degrees     Strength/Myotome Testing     Left Knee   Flexion: 4  Extension: 4+      See Exercise, Manual, and Modality Logs for complete treatment.       Assessment & Plan       Assessment  Impairments: activity intolerance, impaired physical strength and pain with function   Functional limitations: uncomfortable because of pain, standing, stooping and unable to perform repetitive tasks   Assessment details: Patient has made progress with therapy with improved left knee mobility/strength with decrease pain, but still has difficulty and pain with sit to stand transfers and would benefit from continued skilled therapy to return patient back to independent prior level of function.    Goals  Plan Goals: 1. The patient has limited ROM of the L knee.   LTG 1: 12 weeks:  The patient will demonstrate flexion to 105 degrees of ROM for the L knee in order to allow patient to perform sit to stand and go up/down stairs.   STATUS:  Met  STG 1a: 6 weeks:  The patient will demonstrate flexion to 100 degrees of ROM for the L knee.   STATUS: Met      2. The patient has limited strength of the L knee.   LTG 2: 12 weeks: The patient will demonstrate 5/5 strength for L knee flexion and extension in order to allow patient improved joint stability.   STATUS:  progressing  STG 2a: 6 weeks: The patient will demonstrate 4+/5 strength  for L knee flexion and extension   STATUS:  Met       3. The patient has gait dysfunction.   LTG 3: 12 weeks:  The patient will ambulate without assistive device, independently, for community distances with minimal limp to the L lower extremity in order to improve mobility and allow patient to perform activities such as grocery shopping with greater ease.   STATUS:  Met  STG 3a: The patient will be independent in HEP.   STATUS: Met and progressing as appropriate      4. The patient complains of L knee pain.   LTG 4: 12 weeks:  The patient will report a pain rating of 2/10 or better in order to improve tolerance to activities of daily living and improve sleep quality.   STATUS:  Ongoing  STG 4a: 6 weeks:  The patient will report a pain rating of 4/10 or better.   STATUS:  Met      5. Mobility: Walking/Moving Around Functional Limitation                   LTG 5: 12 weeks:  The patient will demonstrate 15% limitation by achieving a score of 68/80 on the Lower Extremity Functional Scale.   STATUS:  progressing  STG 5a: 6 weeks:  The patient will demonstrate 19% limitation by achieving a score of 65/80 on the Lower Extremity Functional Scale.     STATUS:  Met       Plan  Therapy options: will be seen for skilled therapy services  Planned therapy interventions: manual therapy, strengthening, stretching, therapeutic activities, home exercise program, flexibility, functional ROM exercises and soft tissue mobilization  Frequency: 2x week  Duration in weeks: 4  Treatment plan discussed with: patient        Visit Diagnoses:    ICD-10-CM ICD-9-CM   1. Acute pain of left knee  M25.562 719.46   2. Decreased range of motion of left knee  M25.662 719.56       Progress per Plan of Care and Progress strengthening /stabilization /functional activity           Timed:  Manual Therapy:         mins  60984;  Therapeutic Exercise:    32     mins  32283;     Neuromuscular Tobin:        mins  55076;    Therapeutic Activity:          mins   54478;     Gait Training:           mins  97590;     Ultrasound:          mins  79835;    Electrical Stimulation:         mins  24649 ( );    Untimed:  Electrical Stimulation:         mins  47269 ( );  Mechanical Traction:         mins  48258;     Timed Treatment:   32   mins   Total Treatment:     32   mins  Anni Gordon PT    Electronically singed 5/20/2025      [place] PT license: [id number]  Physical Therapist

## 2025-05-22 ENCOUNTER — APPOINTMENT (OUTPATIENT)
Facility: HOSPITAL | Age: 60
End: 2025-05-22
Payer: COMMERCIAL

## 2025-06-02 ENCOUNTER — HOSPITAL ENCOUNTER (OUTPATIENT)
Facility: HOSPITAL | Age: 60
Setting detail: THERAPIES SERIES
End: 2025-06-02

## 2025-06-03 ENCOUNTER — OFFICE VISIT (OUTPATIENT)
Dept: ORTHOPEDIC SURGERY | Facility: CLINIC | Age: 60
End: 2025-06-03
Payer: COMMERCIAL

## 2025-06-03 ENCOUNTER — HOSPITAL ENCOUNTER (OUTPATIENT)
Facility: HOSPITAL | Age: 60
Setting detail: THERAPIES SERIES
End: 2025-06-03

## 2025-06-03 VITALS
WEIGHT: 220.46 LBS | OXYGEN SATURATION: 96 % | HEIGHT: 64 IN | SYSTOLIC BLOOD PRESSURE: 135 MMHG | HEART RATE: 89 BPM | BODY MASS INDEX: 37.64 KG/M2 | DIASTOLIC BLOOD PRESSURE: 89 MMHG

## 2025-06-03 DIAGNOSIS — Z96.652 HISTORY OF TOTAL KNEE ARTHROPLASTY, LEFT: Primary | ICD-10-CM

## 2025-06-03 DIAGNOSIS — S89.92XS LEFT KNEE INJURY, SEQUELA: ICD-10-CM

## 2025-06-03 DIAGNOSIS — M25.562 LEFT KNEE PAIN, UNSPECIFIED CHRONICITY: ICD-10-CM

## 2025-06-03 NOTE — PROGRESS NOTES
Chief Complaint  Pain and Follow-up of the Left Knee     Subjective      History of Present Illness  The patient is a 60-year-old female who presents for a follow-up on her left knee.    She turned quickly and twisted her knee on 03/23/2025 while at work, trying to help a patient when an alarm was going off. She has a history of left total knee arthroplasty (TKA). During her initial evaluation on 04/03/2025, she was provided with a knee brace. At her follow-up on 04/24/2025, she reported attending 2 physical therapy sessions focused on strengthening. However, she found the brace painful, leading the therapist to recommend a knee sleeve. At that visit, she was prescribed Voltaren gel and diclofenac pills, and her work note was updated. Her last physical therapy session was about 2 weeks ago, where they noted passive ROM 0 to 110 degrees. Her current knee pain is rated at 2/10. Her limitations have improved from her initial score, and she has met all of her goals.    Today, She reports a positive response to the therapy, with significant improvement in her knee condition. However, she experiences severe pain upon transitioning from a seated to standing position, describing it as a stabbing sensation accompanied by pressure behind the kneecap. This necessitates taking small steps or seeking support due to instability. The pain subsides once she begins moving. She also reports persistent swelling in the knee. She has been using a prescribed gel, which she finds beneficial, but she has exhausted her supply. She is unable to tolerate the diclofenac pills due to gastrointestinal upset. She has discontinued the use of Mobic after a single dose due to similar side effects. She currently manages her pain with Tylenol, which also aids her sleep. She alternates between heat and ice applications, noting that ice helps swelling but exacerbates her pain while heat provides relief. She uses a cold rag in conjunction with a heating  "pad. She is currently on light duty at work. She no longer requires the use of a cane.        Allergies   Allergen Reactions    Lortab [Hydrocodone-Acetaminophen] Itching     Pt states she takes tylenol at home        Social History     Socioeconomic History    Marital status:    Tobacco Use    Smoking status: Never    Smokeless tobacco: Never   Vaping Use    Vaping status: Never Used   Substance and Sexual Activity    Alcohol use: Yes     Comment: Current some day occasionally drinks, has been drinking for 6-10 years    Drug use: Never    Sexual activity: Defer        Tobacco Use: Low Risk  (6/3/2025)    Patient History     Smoking Tobacco Use: Never     Smokeless Tobacco Use: Never     Passive Exposure: Not on file     Patient reports that they are a nonsmoker; cessation education not applicable.     I reviewed the patient's chief complaint, history of present illness, review of systems, past medical history, surgical history, family history, social history, medications, and allergy list.     Review of Systems     Constitutional: Denies fevers, chills, weight loss  Cardiovascular: Denies chest pain, shortness of breath  Skin: Denies rashes, acute skin changes  Neurologic: Denies headache, loss of consciousness    Vital Signs:   /89   Pulse 89   Ht 162.6 cm (64\")   Wt 100 kg (220 lb 7.4 oz)   SpO2 96%   BMI 37.84 kg/m²          Physical Exam  General: Alert. No acute distress    Ortho Exam    General: Alert, no acute distress  Left lower extremity: Mild knee effusion. 0 degrees extension.Flexion 110 degrees. Knee extensor mechanism intact. Knee stable to varus and valgus stress. Calf soft, nontender. Demonstrates active ankle plantarflexion and dorsiflexion. Sensation intact over the dorsal and plantar foot.  Palpable pedal pulses.          Physical Exam              Assessment and Plan     Diagnoses and all orders for this visit:    1. History of total knee arthroplasty, left (Primary)  -     " Ambulatory Referral to Physical Therapy for Evaluation & Treatment    2. Left knee injury, sequela  -     Ambulatory Referral to Physical Therapy for Evaluation & Treatment    3. Left knee pain, unspecified chronicity  -     Ambulatory Referral to Physical Therapy for Evaluation & Treatment    Other orders  -     Diclofenac Sodium (VOLTAREN) 1 % gel gel; Apply 4 g topically to the appropriate area as directed 4 (Four) Times a Day.  Dispense: 100 g; Refill: 5        Assessment & Plan  1. Left knee pain:  Still with some residual edmema/pain but overall improved.     Continue PT. Order updated. Inquire PT about lymphatic taping.     Continue current work restrictions.     Rx alternative pain cream rx sent to try as well as refill on Diclofenac. Advised not to use both at same time.     If no improvement by next visit, could consider Medrol dosepack.      Follow-up  Follow up in 6 weeks.    Patient or patient representative verbalized consent for the use of Ambient Listening during the visit with  Mariaa Garcia PA-C for chart documentation. 6/4/2025  18:56 EDT    Follow Up   There are no Patient Instructions on file for this visit.        Patient was given instructions and counseling regarding her condition or for health maintenance advice. Please see specific information pulled into the AVS if appropriate.     Mariaa Garcia PA-C   06/03/2025  15:30 EDT        Dictated Utilizing Dragon Dictation. Please note that portions of this note were completed with a voice recognition program. Part of this note may be an electronic transcription/translation of spoken language to printed text using the Dragon Dictation System.

## 2025-06-05 ENCOUNTER — TELEPHONE (OUTPATIENT)
Dept: ORTHOPEDIC SURGERY | Facility: CLINIC | Age: 60
End: 2025-06-05

## 2025-06-05 ENCOUNTER — HOSPITAL ENCOUNTER (OUTPATIENT)
Facility: HOSPITAL | Age: 60
Setting detail: THERAPIES SERIES
Discharge: HOME | End: 2025-06-05
Payer: COMMERCIAL

## 2025-06-05 DIAGNOSIS — M25.562 ACUTE PAIN OF LEFT KNEE: Primary | ICD-10-CM

## 2025-06-05 DIAGNOSIS — M25.662 DECREASED RANGE OF MOTION OF LEFT KNEE: ICD-10-CM

## 2025-06-05 PROCEDURE — 97110 THERAPEUTIC EXERCISES: CPT | Performed by: PHYSICAL THERAPIST

## 2025-06-05 NOTE — TELEPHONE ENCOUNTER
Provider: DANETTE    Caller: VIMAL MILLAN     Phone Number: [phone]*    Reason for Call: VIMAL CALLING TO VERIFY IF PT ATTENDED LAST OFFICE VISIT AND WANTING TO KNOW IF PT WAS GIVEN A RETURN TO WORK NOTE, STATING SHE HAS NOT BEEN ABLE TO REACH THE PT. PLEASE ADVISE.

## 2025-06-05 NOTE — THERAPY TREATMENT NOTE
Outpatient Physical Therapy Ortho Treatment Note  ERIC [facility]     Patient Name: Pam Cade  : 1965  MRN: [id number]  Today's Date: 2025      Visit Date: 2025    Visit Dx:    ICD-10-CM ICD-9-CM   1. Acute pain of left knee  M25.562 719.46   2. Decreased range of motion of left knee  M25.662 719.56       Patient Active Problem List   Diagnosis    Allergic rhinitis due to allergen    Ankle pain    Arthritis    B12 deficiency    Bladder disorder    Cramps of lower extremity    Gastroesophageal reflux disease without esophagitis    Folic acid deficiency    Foot pain, left    Hypertension    Iron deficiency    Kidney problem    Ulcerative lesion    Urinary urgency    Nocturia    Increased frequency of urination    Neck pain    Acute pain of right shoulder    Radiculitis, cervical    Chronic headaches    Epigastric pain    Mixed hyperlipidemia    Abnormal ECG    Abnormal nuclear stress test    Cardiomyopathy, dilated    Anemia    Anxiety    Mild episode of recurrent major depressive disorder    Panic attack    Nail fungus    Tachycardia    Chronic systolic heart failure    Thyroid nodule    Diarrhea    Vision changes    Stabbing headache    Lightheaded    Hematuria    Dysuria    Acute cystitis with hematuria    Eustachian tube disorder, bilateral    Herpes    Recurrent cold sores    Class 3 severe obesity due to excess calories with serious comorbidity and body mass index (BMI) of 40.0 to 44.9 in adult    Impaired fasting glucose    Congestion of nasal sinus    Annual physical exam    Palpitations    Allergic rhinitis    Pain of left lower extremity    Left leg swelling    Vitamin D deficiency    Fatigue        Past Medical History:   Diagnosis Date    Allergic rhinitis due to allergen 2021    Ankle pain     Arthritis     B12 deficiency 2019    Bladder disorder     Condition not found     Ulcer    Cramps of lower extremity     Folic acid deficiency 2019    Foot pain, left 2018     High cholesterol     Hypertension     Kidney problem         Past Surgical History:   Procedure Laterality Date    CARDIAC CATHETERIZATION N/A 3/3/2022    Procedure: Left Heart Cath - right radial;  Surgeon: CATHERINE Hall MD;  Location: [facility] CATH INVASIVE LOCATION;  Service: Cardiology;  Laterality: N/A;    HIATAL HERNIA REPAIR  02/2019    HYSTERECTOMY      INCONTINENCE SURGERY      LAPAROSCOPIC CHOLECYSTECTOMY  02/2019    OTHER SURGICAL HISTORY      Artificial Joints/Limbs    OTHER SURGICAL HISTORY      Joint surgery    REVISION / REMOVAL NEUROSTIMULATOR      TOTAL KNEE ARTHROPLASTY Left                         PT Assessment/Plan       Row Name 06/05/25 1600          PT Assessment    Functional Limitations Impaired gait;Impaired locomotion;Performance in self-care ADL;Performance in work activities;Limitations in functional capacity and performance  -RO     Impairments Gait;Endurance;Pain;Muscle strength;Joint mobility;Locomotion;Range of motion  -RO     Assessment Comments Patient added ESTIM today to help with left quad contractions. Patient has orders for OT for lymph drainage/taping.  -RO     Rehab Potential Good  -RO     Patient/caregiver participated in establishment of treatment plan and goals Yes  -RO     Patient would benefit from skilled therapy intervention Yes  -RO        PT Plan    PT Plan Comments continue per POC  -RO               User Key  (r) = Recorded By, (t) = Taken By, (c) = Cosigned By      Initials Name Provider Type    Anni Luna, PT Physical Therapist                     Modalities       Row Name 06/05/25 1600             ELECTRICAL STIMULATION    Attended/Unattended Unattended  -RO      Stimulation Type Russian  -RO      Max mAmp 24  -RO      Location/Electrode Placement/Other left VMO/Quad  -RO      PT E-Stim Unattended Minutes 5  -RO                User Key  (r) = Recorded By, (t) = Taken By, (c) = Cosigned By      Initials Name Provider Type    Anni Luna  PT Physical Therapist                   OP Exercises       Row Name 06/05/25 1600             Subjective    Subjective Comments Patient reports left knee is fine except for sit to stand transfers.  -RO         Subjective Pain    Able to rate subjective pain? yes  -RO      Pre-Treatment Pain Level 2  -RO      Post-Treatment Pain Level 2  -RO      Subjective Pain Comment left knee  -RO         Total Minutes    77811 - PT Therapeutic Exercise Minutes 27  -RO         Exercise 1    Exercise Name 1 hooklying hip add/ball squeeze  -RO      Cueing 1 Verbal;Tactile  -RO      Reps 1 x20  -RO         Exercise 2    Exercise Name 2 LAQ  -RO      Cueing 2 Verbal;Demo  -RO      Sets 2 3lbs  -RO      Reps 2 x20 L  -RO         Exercise 3    Exercise Name 3 seated hamstring curls  -RO      Cueing 3 Verbal;Tactile  -RO      Sets 3 black band  -RO      Reps 3 not today  -RO         Exercise 4    Exercise Name 4 bolster bridges  -RO      Cueing 4 Verbal  -RO      Reps 4 x20  -RO         Exercise 5    Exercise Name 5 hooklying hip abduction  -RO      Cueing 5 Verbal;Tactile  -RO      Sets 5 black band  -RO      Reps 5 x20  -RO         Exercise 6    Exercise Name 6 Sci-fit Bike  -RO      Cueing 6 Verbal  -RO      Reps 6 full circles  -RO      Time 6 x5 min  -RO         Exercise 7    Exercise Name 7 slant board calf stretch  -RO      Cueing 7 Verbal;Demo  -RO      Reps 7 not today  -RO         Exercise 8    Exercise Name 8 TKE  -RO      Cueing 8 Verbal;Demo;Tactile  -RO      Sets 8 level 3  -RO      Reps 8 x20 L  -RO         Exercise 9    Exercise Name 9 SAQ  -RO      Cueing 9 Verbal;Demo  -RO      Sets 9 24 max amp  -RO      Reps 9 x5 min  -RO      Time 9 with ESTIM-Cuban  -RO                User Key  (r) = Recorded By, (t) = Taken By, (c) = Cosigned By      Initials Name Provider Type    RO Anni Gordon, PT Physical Therapist                                                    Time Calculation:   Timed Charges  84315 - PT  Therapeutic Exercise Minutes: 27  Untimed Charges  PT E-Stim Unattended Minutes: 5  Total Minutes  Timed Charges Total Minutes: 27  Untimed Charges Total Minutes: 5   Total Minutes: 32                Anni Gordon, PT  6/5/2025

## 2025-06-10 ENCOUNTER — HOSPITAL ENCOUNTER (OUTPATIENT)
Facility: HOSPITAL | Age: 60
Setting detail: THERAPIES SERIES
Discharge: HOME | End: 2025-06-10
Payer: COMMERCIAL

## 2025-06-10 DIAGNOSIS — M25.562 ACUTE PAIN OF LEFT KNEE: Primary | ICD-10-CM

## 2025-06-10 PROCEDURE — 97110 THERAPEUTIC EXERCISES: CPT | Performed by: PHYSICAL THERAPIST

## 2025-06-10 NOTE — THERAPY TREATMENT NOTE
Outpatient Physical Therapy Ortho Treatment Note  ERIC [facility]     Patient Name: Pam Cade  : 1965  MRN: [id number]  Today's Date: 6/10/2025      Visit Date: 06/10/2025    Visit Dx:    ICD-10-CM ICD-9-CM   1. Acute pain of left knee  M25.562 719.46       Patient Active Problem List   Diagnosis    Allergic rhinitis due to allergen    Ankle pain    Arthritis    B12 deficiency    Bladder disorder    Cramps of lower extremity    Gastroesophageal reflux disease without esophagitis    Folic acid deficiency    Foot pain, left    Hypertension    Iron deficiency    Kidney problem    Ulcerative lesion    Urinary urgency    Nocturia    Increased frequency of urination    Neck pain    Acute pain of right shoulder    Radiculitis, cervical    Chronic headaches    Epigastric pain    Mixed hyperlipidemia    Abnormal ECG    Abnormal nuclear stress test    Cardiomyopathy, dilated    Anemia    Anxiety    Mild episode of recurrent major depressive disorder    Panic attack    Nail fungus    Tachycardia    Chronic systolic heart failure    Thyroid nodule    Diarrhea    Vision changes    Stabbing headache    Lightheaded    Hematuria    Dysuria    Acute cystitis with hematuria    Eustachian tube disorder, bilateral    Herpes    Recurrent cold sores    Class 3 severe obesity due to excess calories with serious comorbidity and body mass index (BMI) of 40.0 to 44.9 in adult    Impaired fasting glucose    Congestion of nasal sinus    Annual physical exam    Palpitations    Allergic rhinitis    Pain of left lower extremity    Left leg swelling    Vitamin D deficiency    Fatigue        Past Medical History:   Diagnosis Date    Allergic rhinitis due to allergen 2021    Ankle pain     Arthritis     B12 deficiency 2019    Bladder disorder     Condition not found     Ulcer    Cramps of lower extremity     Folic acid deficiency 2019    Foot pain, left 2018    High cholesterol     Hypertension     Kidney problem          Past Surgical History:   Procedure Laterality Date    CARDIAC CATHETERIZATION N/A 3/3/2022    Procedure: Left Heart Cath - right radial;  Surgeon: CATHERINE Hall MD;  Location: [facility] CATH INVASIVE LOCATION;  Service: Cardiology;  Laterality: N/A;    HIATAL HERNIA REPAIR  02/2019    HYSTERECTOMY      INCONTINENCE SURGERY      LAPAROSCOPIC CHOLECYSTECTOMY  02/2019    OTHER SURGICAL HISTORY      Artificial Joints/Limbs    OTHER SURGICAL HISTORY      Joint surgery    REVISION / REMOVAL NEUROSTIMULATOR      TOTAL KNEE ARTHROPLASTY Left                         PT Assessment/Plan       Row Name 06/10/25 1550          PT Assessment    Functional Limitations Impaired gait;Impaired locomotion;Performance in self-care ADL;Performance in work activities;Limitations in functional capacity and performance  -RO     Impairments Gait;Endurance;Pain;Muscle strength;Joint mobility;Locomotion;Range of motion  -RO     Assessment Comments Trail of kinesiotaping for left patellar alignment.  -RO     Rehab Potential Good  -RO     Patient/caregiver participated in establishment of treatment plan and goals Yes  -RO     Patient would benefit from skilled therapy intervention Yes  -RO        PT Plan    PT Plan Comments continue per POC  -RO               User Key  (r) = Recorded By, (t) = Taken By, (c) = Cosigned By      Initials Name Provider Type    RO Anni Gordon, PT Physical Therapist                     Modalities       Row Name 06/10/25 1550             ELECTRICAL STIMULATION    Attended/Unattended Unattended  -RO      Stimulation Type Russian  -RO      Max mAmp 30  -RO      Location/Electrode Placement/Other left VMO/Quad  -RO                User Key  (r) = Recorded By, (t) = Taken By, (c) = Cosigned By      Initials Name Provider Type    Anni Luna, PT Physical Therapist                   OP Exercises       Row Name 06/10/25 2912             Subjective    Subjective Comments Patient states only difficulty with  left knee with sit to stand transfers  -RO         Subjective Pain    Able to rate subjective pain? yes  -RO      Pre-Treatment Pain Level 2  -RO      Post-Treatment Pain Level 2  -RO      Subjective Pain Comment left knee  -RO         Total Minutes    72131 - PT Therapeutic Exercise Minutes 30  -RO         Exercise 1    Exercise Name 1 hooklying hip add/ball squeeze  -RO      Cueing 1 Verbal;Tactile  -RO      Reps 1 x20  -RO         Exercise 2    Exercise Name 2 LAQ  -RO      Cueing 2 Verbal;Demo  -RO      Sets 2 4lbs  -RO      Reps 2 x20 L  -RO         Exercise 3    Exercise Name 3 seated hamstring curls  -RO      Cueing 3 Verbal;Tactile  -RO      Sets 3 black band  -RO      Reps 3 x20 L  -RO         Exercise 4    Exercise Name 4 bolster bridges  -RO      Cueing 4 Verbal  -RO      Reps 4 x20  -RO         Exercise 5    Exercise Name 5 hooklying hip abduction  -RO      Cueing 5 Verbal;Tactile  -RO      Sets 5 black band  -RO      Reps 5 x20  -RO         Exercise 6    Exercise Name 6 Sci-fit Bike  -RO      Cueing 6 Verbal  -RO      Reps 6 full circles  -RO      Time 6 x5 min  -RO         Exercise 7    Exercise Name 7 slant board calf stretch  -RO      Cueing 7 Verbal;Demo  -RO      Reps 7 not today  -RO         Exercise 8    Exercise Name 8 TKE  -RO      Cueing 8 Verbal;Demo;Tactile  -RO      Sets 8 level 3  -RO      Reps 8 x20 L  -RO         Exercise 9    Exercise Name 9 SAQ  -RO      Cueing 9 Verbal;Demo  -RO      Sets 9 30 max amp  -RO      Reps 9 x5 min  -RO      Time 9 with ESTIM-[place]  -RO         Exercise 10    Exercise Name 10 trial of kinesiotaping for left patellar tracking.  -RO                User Key  (r) = Recorded By, (t) = Taken By, (c) = Cosigned By      Initials Name Provider Type    RO Anni Gordon, PT Physical Therapist                                                    Time Calculation:   Timed Charges  81112 - PT Therapeutic Exercise Minutes: 30  Total Minutes  Timed Charges Total  Minutes: 30   Total Minutes: 30                Anni Gordon, PT  6/10/2025

## 2025-06-12 ENCOUNTER — HOSPITAL ENCOUNTER (OUTPATIENT)
Dept: OCCUPATIONAL THERAPY | Facility: HOSPITAL | Age: 60
Setting detail: THERAPIES SERIES
Discharge: HOME | End: 2025-06-12
Payer: COMMERCIAL

## 2025-06-12 DIAGNOSIS — I89.0 LYMPHEDEMA: Primary | ICD-10-CM

## 2025-06-12 DIAGNOSIS — M79.89 LEFT LEG SWELLING: ICD-10-CM

## 2025-06-12 PROCEDURE — 97166 OT EVAL MOD COMPLEX 45 MIN: CPT

## 2025-06-12 NOTE — THERAPY EVALUATION
Outpatient Occupational Therapy Lymphedema Initial Evaluation  ERIC [facility]     Patient Name: Pam Cade  : 1965  MRN: [id number]  Today's Date: 2025      Visit Date: 2025    Patient Active Problem List   Diagnosis    Allergic rhinitis due to allergen    Ankle pain    Arthritis    B12 deficiency    Bladder disorder    Cramps of lower extremity    Gastroesophageal reflux disease without esophagitis    Folic acid deficiency    Foot pain, left    Hypertension    Iron deficiency    Kidney problem    Ulcerative lesion    Urinary urgency    Nocturia    Increased frequency of urination    Neck pain    Acute pain of right shoulder    Radiculitis, cervical    Chronic headaches    Epigastric pain    Mixed hyperlipidemia    Abnormal ECG    Abnormal nuclear stress test    Cardiomyopathy, dilated    Anemia    Anxiety    Mild episode of recurrent major depressive disorder    Panic attack    Nail fungus    Tachycardia    Chronic systolic heart failure    Thyroid nodule    Diarrhea    Vision changes    Stabbing headache    Lightheaded    Hematuria    Dysuria    Acute cystitis with hematuria    Eustachian tube disorder, bilateral    Herpes    Recurrent cold sores    Class 3 severe obesity due to excess calories with serious comorbidity and body mass index (BMI) of 40.0 to 44.9 in adult    Impaired fasting glucose    Congestion of nasal sinus    Annual physical exam    Palpitations    Allergic rhinitis    Pain of left lower extremity    Left leg swelling    Vitamin D deficiency    Fatigue        Past Medical History:   Diagnosis Date    Allergic rhinitis due to allergen 2021    Ankle pain     Arthritis     B12 deficiency 2019    Bladder disorder     Condition not found     Ulcer    Cramps of lower extremity     Folic acid deficiency 2019    Foot pain, left 2018    High cholesterol     Hypertension     Kidney problem         Past Surgical History:   Procedure Laterality Date    CARDIAC  CATHETERIZATION N/A 3/3/2022    Procedure: Left Heart Cath - right radial;  Surgeon: CATHERINE Hall MD;  Location: [facility] CATH INVASIVE LOCATION;  Service: Cardiology;  Laterality: N/A;    HIATAL HERNIA REPAIR  02/2019    HYSTERECTOMY      INCONTINENCE SURGERY      LAPAROSCOPIC CHOLECYSTECTOMY  02/2019    OTHER SURGICAL HISTORY      Artificial Joints/Limbs    OTHER SURGICAL HISTORY      Joint surgery    REVISION / REMOVAL NEUROSTIMULATOR      TOTAL KNEE ARTHROPLASTY Left          Visit Dx:     ICD-10-CM ICD-9-CM   1. Lymphedema  I89.0 457.1   2. Left leg swelling  M79.89 729.81        Patient History       Row Name 06/12/25 1400 06/12/25 1300          History    Chief Complaint Joint swelling;Numbness;Tinglings  -JS --     Date Current Problem(s) Began 03/21/25  -JS --     Brief Description of Current Complaint Pt. complains of diffuse knee swelling and pain in LLE  -JS --     Previous treatment for THIS PROBLEM Medication;Rehabilitation  -JS --     Patient/Caregiver Goals Return to prior level of function;Relieve pain;Return to work  -JS --     Patient's Rating of General Health Very good  -JS --     Hand Dominance right-handed  -JS --     Patient seeing anyone else for problem(s)? physical therapy  -JS --     How has patient tried to help current problem? elevation and physical therapy  -JS --     What clinical tests have you had for this problem? X-ray  -JS --     Surgery/Hospitalization -- Knee surgery bilateral, hernia, bladder inner stem, hysterectomy,  -JS        Fall Risk Assessment    Any falls in the past year: -- No  -JS     Does patient have a fear of falling No  -JS --        Services    Prior Rehab/Home Health Experiences No  -JS --     Are you currently receiving Home Health services No  -JS --     Do you plan to receive Home Health services in the near future No  -JS --        Daily Activities    Primary Language English  -JS --     Are you able to read Yes  -JS --     Are you able to write Yes  -JS  --     How does patient learn best? Demonstration  -JS --     Teaching needs identified Home Exercise Program;Management of Condition  -JS --     Patient is concerned about/has problems with Walking  -JS --     Does patient have problems with the following? None  -JS --     Barriers to learning None  -JS --     Pt Participated in POC and Goals Yes  -JS --        Safety    Are you being hurt, hit, or frightened by anyone at home or in your life? -- No  -JS     Are you being neglected by a caregiver -- No  -JS     Have you had any of the following issues with -- N/A  -JS               User Key  (r) = Recorded By, (t) = Taken By, (c) = Cosigned By      Initials Name Provider Type    Sean Cabrera, OT Occupational Therapist                     Lymphedema       Row Name 06/12/25 1300             Subjective Pain    Able to rate subjective pain? yes  -JS      Pre-Treatment Pain Level 8  -JS      Post-Treatment Pain Level 8  -JS      Subjective Pain Comment Left knee  -JS         Subjective    Subjective Comments The patient, currently on workers’ compensation following a work-related left knee injury on March 23rd, reports a stabbing pain rated 7/10 localized to the left knee. She has intermittently applied ice and a topical analgesic gel with minimal relief. The patient also notes occasional numbness and tingling in the lower leg. She expresses concern about persistent swelling and hopes to avoid further downtime at work.  -JS         Vital Signs    Pre Systolic BP Rehab 148  -JS      Pre Treatment Diastolic BP 92  -JS      Pretreatment Heart Rate (beats/min) 80  -JS      Pre SpO2 (%) 96  -JS         Lymphedema Assessment    Lymphedema Classification RLE:;LLE:  -JS      Lymphedema Cancer Related Sx right;left  -JS      Infections or Cellulitis? no  -JS         LLIS - Physical Concerns    The amount of pain associated with my lymphedema is: 3  -JS      The amount of limb heaviness associated with my lymphedema is: 3  -JS  "     The amount of skin tightness associated with my lymphedema is: 4  -JS      The size of my swollen limb(s) seems: 3  -JS      Lymphedema affects the movement of my swollen limb(s): 4  -JS      The strength in my swollen limb(s) is: 4  -JS         LLIS - Psychosocial Concerns    Lymphedema affects my body image (i.e., \"how I think I look\"). 3  -JS      Lymphedema affects my socializing with others. 3  -JS      Lymphedema affects my intimate relations with spouse or partner (rate 0 if not applicable 3  -JS      Lymphedema \"gets me down\" (i.e., depression, frustration, or anger) 3  -JS      I must rely on others for help due to my lymphedema. 3  -JS      I know what to do to manage my lymphedema 4  -JS         LLIS - Functional Concerns    Lymphedema affects my ability to perform self-care activities (i.e. eating, dressing, hygiene) 3  -JS      Lymphedema affects my ability to perform routine home or work-related activities. 3  -JS      Lymphedema affects my performance of preferred leisure activities. 3  -JS      Lymphedema affects proper fit of clothing/shoes 4  -JS      Lymphedema affects my sleep 4  -JS         General ROM    GENERAL ROM COMMENTS --  impaired knee ROM grossly  -JS         MMT (Manual Muscle Testing)    General MMT Comments not tested this date secondary to patient request secondary to pain  -JS         Lymphedema Edema Assessment    Ptting Edema Category By grade out of 3  -JS      Pitting Edema + 1/4 (+1 of 4)  -JS      Stemmer Sign negative  -JS      [place] Hump negative  -JS         Skin Changes/Observations    Location/Assessment Lower Extremity  -JS      Lower Extremity Conditions left:  -JS      Lower Extremity Color/Pigment erythema;left:;blanchable;hyperpigmented  -JS      Lower Extremity Skin Details Inspection of the left lower extremity revealed non-pitting edema circumscribing the knee joint. Prominent varicose veins were noted on the medial aspect of the thigh above the knee and " along the distal anterior leg. There was scattered ecchymosis with slight erythema and areas of hyperpigmentation. The skin over the knee exhibited dryness without open wounds. Patellar bulging was observed on palpation, though no signs of acute inflammation such as warmth or fluctuance were present.  -JS         Lymphedema Sensation    Lymphedema Sensation Reports LLE:  -JS      Lymphedema Sensation Tests sharp/dull  -JS      Lymphedema Sharp/Dull LLE:  -JS         Lymphedema Pulses/Capillary Refill    Lymphedema Pulses/Capillary Refill lower extremity pulses;capillary refill  -JS      Dorsalis Pedis Pulse +2 normal  -JS      Posterior Tibialis Pulse +2 normal  -JS      Capillary Refill lower extremity capillary refill  -JS      Lower Extremity Capillary Refill less than 3 seconds;left:  -JS         Lymphedema Measurements    Measurement Type(s) Circumferential  -JS      Circumferential Areas Lower extremities  -JS         Compression/Skin Care    Compression/Skin Care Comments Patient will be educated on the application of lymphatic taping to the medial thigh and distal anterior leg to promote proximal fluid movement. Instruction in manual lymphatic drainage (MLD) techniques will be provided, including hands-on training to support patient learning of self-MLD. A trial knee compression garment will be introduced to assess proper fit and tolerance, with adjustments made as needed for optimal comfort. The patient will also be guided through a home exercise program (HEP) consisting of diaphragmatic breathing and gentle active knee extension exercises to support lymphatic flow and joint mobility.  -JS         Lymphedema Life Impact Scale Totals    A.  Total Q1 - Q17 (Do not include Q18) 57  -JS      B.  Total number of questions answered (Q1-Q17) 17  -JS      C. Divide A by B 3.35  -JS      D. Multiple C by 25 83.75  -JS                User Key  (r) = Recorded By, (t) = Taken By, (c) = Cosigned By      Initials Name  Provider Type    Sean Cabrera OT Occupational Therapist                    Circumferential Measurements         Baseline: R: 2800.58 ml L: 2649.99 ml  % volume change: R: --%   L: -- %         Therapy Education  Education Details: The patient received comprehensive education on self-management strategies for lymphedema of the left lower extremity, including targeted active and passive exercises to stimulate and improve lymphatic flow. Daily skin-care routines were reviewed to minimize infection risk--emphasizing gentle cleansing, thorough drying, and application of a moisturizer to maintain skin integrity. Instruction was also provided on the functional anatomy of the lymphatic system to enhance the patient’s understanding of how manual lymphatic drainage (MLD) and exercise support overall fluid movement. Additionally, dietary recommendations were discussed, focusing on a low-sodium, anti-inflammatory diet to reduce fluid retention and swelling. The patient demonstrated engagement during the session and verbalized clear understanding of the material presented.  Given: Symptoms/condition management, Edema management  Program: New  How Provided: Verbal  Provided to: Patient  Level of Understanding: Teach back education performed, Verbalized             OT Assessment/Plan       Row Name 06/12/25 1400          OT Assessment    Functional Limitations Performance in self-care ADL;Impaired gait  -JS     Impairments Edema;Impaired lymphatic circulation  -JS     Assessment Comments The patient exhibits clear understanding of lymphedema management principles and demonstrates motivation to engage in self-care. Skin assessment findings corroborate early-stage lymphedema with associated venous insufficiency. With continued manual lymphatic drainage training, lymphatic taping, and proper compression garment use, the patient is expected to achieve measurable reduction in edema and pain, improve knee mobility, and progress  toward safe return to work duties.  -JS     OT Diagnosis Lymphedema of the left lower extremity secondary to work-related knee trauma.  -JS     OT Rehab Potential Good  -JS     Patient/caregiver participated in establishment of treatment plan and goals Yes  -JS     Patient would benefit from skilled therapy intervention Yes  -JS        OT Plan    OT Frequency 2x/week  -JS     Predicted Duration of Therapy Intervention (OT) 12 weeks  -JS     Planned CPT's? OT ORTHOTIC MGMT/TRAIN EA 15 MIN: 79643;OT SELF CARE/MGMT/TRAIN 15 MIN: 75080;OT MANUAL THERAPY EA 15 MIN: 24811;OT RE-EVAL: 54079;OT EVAL MOD COMPLEXITY: 17105  -JS     Planned Therapy Interventions (Optional Details) strengthening;home exercise program  -JS     OT Plan Comments continue POC  -JS               User Key  (r) = Recorded By, (t) = Taken By, (c) = Cosigned By      Initials Name Provider Type    Sean Cabrera OT Occupational Therapist                  Short-Term: Patient will achieve a 5% reduction in limb volume in the affected area as measured by circumferential measurements or perometry, within 30 days, to support improved lymphatic drainage. Initial: Ongoing  Long-Term: Patient will achieve a 10% reduction in limb volume in the affected area as measured by circumferential measurements or perometry, within 90 days to promote optimal functional outcomes. Initial: Ongoing  TREATMENT:  Manual therapy, therapeutic exercise, therapeutic activity, ADL retraining, Patient/caregiver education, Modalities: TENS, NMES, Ultrasound, Fluidotherapy Orthotic Management and Training, Wound dressing as needed.       Short-Term: Patient will verbalize understanding of the principles of self-MLD and accurately demonstrate basic self-MLD techniques with minimal cues in 3 out of 3 sessions within 30 days. Initial: Ongoing  Long-Term: Patient will independently perform self-MLD techniques daily and report 90% adherence as documented in a self-care log over a 2-week  period within 90 days.  Initial: Ongoing  TREATMENT:  Manual therapy, therapeutic exercise, therapeutic activity, ADL retraining, Patient/caregiver education, Modalities: TENS, NMES, Ultrasound, Fluidotherapy Orthotic Management and Training, Wound dressing as needed.    Short-Term: Patient will don compression garments independently or with minimal assistance in 3 out of 5 observed trials within 30 days to promote adherence to lymphedema management.  Initial: Ongoing  Long-Term: Patient will independently don and doff compression garments daily with correct technique and report 100% compliance over a 2-week period within 90 days.  Initial: Ongoing  TREATMENT:  Manual therapy, therapeutic exercise, therapeutic activity, ADL retraining, Patient/caregiver education, Modalities: TENS, NMES, Ultrasound, Fluidotherapy Orthotic Management and Training, Wound dressing as needed.    Short-Term: Therapist will assess and ensure proper fit of compression garments and make necessary modifications or referrals within the first 30 days of treatment.  Initial: Ongoing  Long-Term: Therapist will reassess garment fit and function and provide modifications or replacement recommendations as needed within the 90-day treatment period.  Initial: Ongoing  TREATMENT:  Manual therapy, therapeutic exercise, therapeutic activity, ADL retraining, Patient/caregiver education, Modalities: TENS, NMES, Ultrasound, Fluidotherapy Orthotic Management and Training, Wound dressing as needed.    Short-Term: Patient will identify at least 3 key components of lymphedema skin care and demonstrate proper daily hygiene and moisturizing routine in 2 out of 3 sessions within 30 days.  Initial: Ongoing  Long-Term: Patient will consistently implement a skin care routine including inspection, hygiene, and moisturizing with no reported skin breakdown over 90 days.  Initial: Ongoing  TREATMENT:  Manual therapy, therapeutic exercise, therapeutic activity, ADL  retraining, Patient/caregiver education, Modalities: TENS, NMES, Ultrasound, Fluidotherapy Orthotic Management and Training, Wound dressing as needed.    Short-Term: Patient will demonstrate prescribed home exercise program (HEP) and lymphatic/venous flow exercises with correct technique in 2 out of 3 sessions within 30 days.  Initial: Ongoing  Long-Term: Patient will demonstrate full HEP and lymphatic/venous flow exercises independently and report completing the routine at least 5 days per week over the previous 2 weeks within 90 days.  Initial: Ongoing  TREATMENT:  Manual therapy, therapeutic exercise, therapeutic activity, ADL retraining, Patient/caregiver education, Modalities: TENS, NMES, Ultrasound, Fluidotherapy Orthotic Management and Training, Wound dressing as needed.    Short-Term:  Patient will demonstrate improved quality of life by achieving a 1-4 point reduction in total score on the Lymphedema Life Impact Scale within 30 days, indicating decreased physical, emotional, and functional impact of lymphedema.  Initial: Ongoing  Long-Term:  Patient will demonstrate sustained improvement in quality of life by achieving a 3-5 point reduction in total score on the Lymphedema Life Impact Scale within 90 days, reflecting enhanced self-management and reduced impact of symptoms on daily functioning.  Initial: Ongoing  TREATMENT:  Manual therapy, therapeutic exercise, therapeutic activity, ADL retraining, Patient/caregiver education, Modalities: TENS, NMES, Ultrasound, Fluidotherapy Orthotic Management and Training, Wound dressing as needed.            Time Calculation:         Therapy Charges for Today       Code Description Service Date Service Provider Modifiers Qty    [id number]  OT EVAL MOD COMPLEXITY 1 6/12/2025 Sean Brown OT GO 1                      Sean Brown OT  6/12/2025

## 2025-06-19 ENCOUNTER — HOSPITAL ENCOUNTER (OUTPATIENT)
Dept: OCCUPATIONAL THERAPY | Facility: HOSPITAL | Age: 60
Setting detail: THERAPIES SERIES
Discharge: HOME | End: 2025-06-19
Payer: COMMERCIAL

## 2025-06-19 DIAGNOSIS — M79.89 LEFT LEG SWELLING: ICD-10-CM

## 2025-06-19 DIAGNOSIS — I89.0 LYMPHEDEMA: Primary | ICD-10-CM

## 2025-06-19 PROCEDURE — 97535 SELF CARE MNGMENT TRAINING: CPT

## 2025-06-19 PROCEDURE — 97140 MANUAL THERAPY 1/> REGIONS: CPT

## 2025-06-19 NOTE — THERAPY TREATMENT NOTE
Outpatient Occupational Therapy Lymphedema Treatment Note  ERIC Conway     Patient Name: Pam Cade  : 1965  MRN: [id number]  Today's Date: 2025      Visit Date: 2025    Patient Active Problem List   Diagnosis    Allergic rhinitis due to allergen    Ankle pain    Arthritis    B12 deficiency    Bladder disorder    Cramps of lower extremity    Gastroesophageal reflux disease without esophagitis    Folic acid deficiency    Foot pain, left    Hypertension    Iron deficiency    Kidney problem    Ulcerative lesion    Urinary urgency    Nocturia    Increased frequency of urination    Neck pain    Acute pain of right shoulder    Radiculitis, cervical    Chronic headaches    Epigastric pain    Mixed hyperlipidemia    Abnormal ECG    Abnormal nuclear stress test    Cardiomyopathy, dilated    Anemia    Anxiety    Mild episode of recurrent major depressive disorder    Panic attack    Nail fungus    Tachycardia    Chronic systolic heart failure    Thyroid nodule    Diarrhea    Vision changes    Stabbing headache    Lightheaded    Hematuria    Dysuria    Acute cystitis with hematuria    Eustachian tube disorder, bilateral    Herpes    Recurrent cold sores    Class 3 severe obesity due to excess calories with serious comorbidity and body mass index (BMI) of 40.0 to 44.9 in adult    Impaired fasting glucose    Congestion of nasal sinus    Annual physical exam    Palpitations    Allergic rhinitis    Pain of left lower extremity    Left leg swelling    Vitamin D deficiency    Fatigue        Past Medical History:   Diagnosis Date    Allergic rhinitis due to allergen 2021    Ankle pain     Arthritis     B12 deficiency 2019    Bladder disorder     Condition not found     Ulcer    Cramps of lower extremity     Folic acid deficiency 2019    Foot pain, left 2018    High cholesterol     Hypertension     Kidney problem         Past Surgical History:   Procedure Laterality Date    CARDIAC  CATHETERIZATION N/A 3/3/2022    Procedure: Left Heart Cath - right radial;  Surgeon: CATHERINE Hall MD;  Location: [facility] CATH INVASIVE LOCATION;  Service: Cardiology;  Laterality: N/A;    HIATAL HERNIA REPAIR  02/2019    HYSTERECTOMY      INCONTINENCE SURGERY      LAPAROSCOPIC CHOLECYSTECTOMY  02/2019    OTHER SURGICAL HISTORY      Artificial Joints/Limbs    OTHER SURGICAL HISTORY      Joint surgery    REVISION / REMOVAL NEUROSTIMULATOR      TOTAL KNEE ARTHROPLASTY Left          Visit Dx:      ICD-10-CM ICD-9-CM   1. Lymphedema  I89.0 457.1   2. Left leg swelling  M79.89 729.81        Lymphedema       Row Name 06/19/25 1500             Subjective Pain    Able to rate subjective pain? yes  -MP      Pre-Treatment Pain Level 6  -MP      Post-Treatment Pain Level 6  -MP      Subjective Pain Comment Left knee  -MP         Lymphedema Assessment    Lymphedema Classification RLE:;LLE:  -MP      Lymphedema Cancer Related Sx right;left  -MP      Infections or Cellulitis? no  -MP         Lymphedema Edema Assessment    Ptting Edema Category By grade out of 3  -MP      Pitting Edema + 1/4 (+1 of 4)  -MP      Stemmer Sign negative  -MP      [place] Hump negative  -MP         Skin Changes/Observations    Location/Assessment Lower Extremity  -MP      Lower Extremity Conditions left:  -MP      Lower Extremity Color/Pigment erythema;left:;blanchable;hyperpigmented  -MP         Lymphedema Sensation    Lymphedema Sensation Reports LLE:  -MP      Lymphedema Sensation Tests sharp/dull  -MP      Lymphedema Sharp/Dull LLE:  -MP         Lymphedema Pulses/Capillary Refill    Lymphedema Pulses/Capillary Refill lower extremity pulses;capillary refill  -MP      Dorsalis Pedis Pulse +2 normal  -MP      Posterior Tibialis Pulse +2 normal  -MP      Capillary Refill lower extremity capillary refill  -MP      Lower Extremity Capillary Refill less than 3 seconds;left:  -MP         Lymphedema Measurements    Measurement Type(s) --  -MP       Circumferential Areas --  -MP         Manual Lymphatic Drainage    Manual Therapy OT instructed in diaphramatic portion of manual lymphatic drainage.  -MP         Compression/Skin Care    Compression/Skin Care skin care;wrapping location;bandaging  -MP      Skin Care lotion applied  -MP      Wrapping Location lower extremity  -MP      Wrapping Location LE left:;foot to groin  -MP      Bandage Layers cotton liner;padding/fluff layer;full limb;short-stretch bandages (comment size/quantity)  -MP      Bandaging Technique circumferential/spiral;moderate compression  -MP      Compression/Skin Care Comments Ice Coban utilized around the upper leg to help secure short stretch wrap in place  -MP                User Key  (r) = Recorded By, (t) = Taken By, (c) = Cosigned By      Initials Name Provider Type    Bessy Rushing OT Occupational Therapist                   OT Assessment/Plan       Row Name 06/19/25 1537          OT Assessment    Assessment Comments Pam tolerated initial treatment well.  She is knowledgeable of how to perform self manual lymphatic drainage.  She requires further instruction and diaphragmatic breathing technique and is asked to practice this technique over the weekend.  She also will require further instruction and lower extremity home exercise program and next treatment session.  She is motivated and is expected to make good progress in occupational therapy services for lymphedema treatment.  She requires ongoing treatment until a plateau in limb volume reduction is achieved, permanent compression garments are obtained and she is independent with complete decongestive therapy.  -MP               User Key  (r) = Recorded By, (t) = Taken By, (c) = Cosigned By      Initials Name Provider Type    Bessy Rushing OT Occupational Therapist                        Manual Rx (Last 36 Hours)       Manual Treatments       Row Name 06/19/25 1700             Total Minutes    69551 - OT Manual Therapy  Minutes 40  -MP                User Key  (r) = Recorded By, (t) = Taken By, (c) = Cosigned By      Initials Name Provider Type    Bessy Rushing OT Occupational Therapist                      Therapy Education  Education Details: Pam was educated to maintain compression between treatment sessions for as long as possible.  She is instructed to remove the upper portion but maintain the lower portion in place should the upper portion begin to droop prior to lower portion.  She was instructed in performance of self manual lymphatic drainage and was provided written instructions.  She returned demonstrated properly.  She is asked to perform diaphragmatic breathing portion of self manual lymphatic drainage while compression wraps are in place and to perform full leg self manual lymphatic drainage if compression wraps come off prior to next treatment session.  She is asked to stay active and elevate legs between activities.  Given: Symptoms/condition management, HEP  Program: New  How Provided: Verbal, Written, Demonstration  Provided to: Patient  Level of Understanding: Teach back education performed, Verbalized  06936 - OT Self Care/Mgmt Minutes: 25         Time Calculation:   Timed Charges  89525 - OT Manual Therapy Minutes: 40  75600 - OT Self Care/Mgmt Minutes: 25  Total Minutes  Timed Charges Total Minutes: 65   Total Minutes: 65     Therapy Charges for Today       Code Description Service Date Service Provider Modifiers Qty    86584074430  OT MANUAL THERAPY EA 15 MIN 6/19/2025 Bessy Olvera OT GO 3    07892105178 HC OT SELF CARE/MGMT/TRAIN EA 15 MIN 6/19/2025 Bessy Olvera OT GO 1            Bessy Olvera OT  6/19/2025

## 2025-06-24 ENCOUNTER — HOSPITAL ENCOUNTER (OUTPATIENT)
Facility: HOSPITAL | Age: 60
Setting detail: THERAPIES SERIES
Discharge: HOME | End: 2025-06-24
Payer: COMMERCIAL

## 2025-06-24 DIAGNOSIS — M25.562 ACUTE PAIN OF LEFT KNEE: Primary | ICD-10-CM

## 2025-06-24 DIAGNOSIS — M25.662 DECREASED RANGE OF MOTION OF LEFT KNEE: ICD-10-CM

## 2025-06-24 PROCEDURE — 97110 THERAPEUTIC EXERCISES: CPT | Performed by: PHYSICAL THERAPIST

## 2025-06-24 NOTE — THERAPY TREATMENT NOTE
Outpatient Physical Therapy Ortho Treatment Note  ERIC Conway     Patient Name: Pam Cade  : 1965  MRN: [id number]  Today's Date: 2025      Visit Date: 2025    Visit Dx:    ICD-10-CM ICD-9-CM   1. Acute pain of left knee  M25.562 719.46   2. Decreased range of motion of left knee  M25.662 719.56       Patient Active Problem List   Diagnosis    Allergic rhinitis due to allergen    Ankle pain    Arthritis    B12 deficiency    Bladder disorder    Cramps of lower extremity    Gastroesophageal reflux disease without esophagitis    Folic acid deficiency    Foot pain, left    Hypertension    Iron deficiency    Kidney problem    Ulcerative lesion    Urinary urgency    Nocturia    Increased frequency of urination    Neck pain    Acute pain of right shoulder    Radiculitis, cervical    Chronic headaches    Epigastric pain    Mixed hyperlipidemia    Abnormal ECG    Abnormal nuclear stress test    Cardiomyopathy, dilated    Anemia    Anxiety    Mild episode of recurrent major depressive disorder    Panic attack    Nail fungus    Tachycardia    Chronic systolic heart failure    Thyroid nodule    Diarrhea    Vision changes    Stabbing headache    Lightheaded    Hematuria    Dysuria    Acute cystitis with hematuria    Eustachian tube disorder, bilateral    Herpes    Recurrent cold sores    Class 3 severe obesity due to excess calories with serious comorbidity and body mass index (BMI) of 40.0 to 44.9 in adult    Impaired fasting glucose    Congestion of nasal sinus    Annual physical exam    Palpitations    Allergic rhinitis    Pain of left lower extremity    Left leg swelling    Vitamin D deficiency    Fatigue        Past Medical History:   Diagnosis Date    Allergic rhinitis due to allergen 2021    Ankle pain     Arthritis     B12 deficiency 2019    Bladder disorder     Condition not found     Ulcer    Cramps of lower extremity     Folic acid deficiency 2019    Foot pain, left 2018     High cholesterol     Hypertension     Kidney problem         Past Surgical History:   Procedure Laterality Date    CARDIAC CATHETERIZATION N/A 3/3/2022    Procedure: Left Heart Cath - right radial;  Surgeon: CATHERINE Hall MD;  Location: [facility] CATH INVASIVE LOCATION;  Service: Cardiology;  Laterality: N/A;    HIATAL HERNIA REPAIR  02/2019    HYSTERECTOMY      INCONTINENCE SURGERY      LAPAROSCOPIC CHOLECYSTECTOMY  02/2019    OTHER SURGICAL HISTORY      Artificial Joints/Limbs    OTHER SURGICAL HISTORY      Joint surgery    REVISION / REMOVAL NEUROSTIMULATOR      TOTAL KNEE ARTHROPLASTY Left                         PT Assessment/Plan       Row Name 06/24/25 1600          PT Assessment    Functional Limitations Impaired gait;Impaired locomotion;Performance in self-care ADL;Performance in work activities;Limitations in functional capacity and performance  -RO     Impairments Gait;Endurance;Pain;Muscle strength;Joint mobility;Locomotion;Range of motion  -RO     Assessment Comments left lower leg tenderness; but no redness/warmth noted; negative hulmans signs. Patient states ESTIM felt better and to return to lymphedema clinic tomorrow.  -RO     Rehab Potential Good  -RO     Patient/caregiver participated in establishment of treatment plan and goals Yes  -RO     Patient would benefit from skilled therapy intervention Yes  -RO        PT Plan    PT Plan Comments continue per POC  -RO               User Key  (r) = Recorded By, (t) = Taken By, (c) = Cosigned By      Initials Name Provider Type    RO Anni Gordon, PT Physical Therapist                       OP Exercises       Row Name 06/24/25 1600             Subjective    Subjective Comments Patient states had her left leg wrapped for edema last week and it is tender/stiff.  -RO         Subjective Pain    Able to rate subjective pain? yes  -RO      Pre-Treatment Pain Level 2  -RO      Post-Treatment Pain Level 2  -RO      Subjective Pain Comment left knee pain  2/10 at rest and 6/10 with movment  -RO         Total Minutes    29204 - PT Therapeutic Exercise Minutes 12  -RO         Exercise 1    Exercise Name 1 SAQ/quad sets  -RO      Cueing 1 Verbal;Tactile  -RO      Sets 1 Swazi ESTIM at 24  -RO      Time 1 x12 min  -RO                User Key  (r) = Recorded By, (t) = Taken By, (c) = Cosigned By      Initials Name Provider Type    RO Anni Gordon, PT Physical Therapist                                                    Time Calculation:   Timed Charges  78920 - PT Therapeutic Exercise Minutes: 12  Total Minutes  Timed Charges Total Minutes: 12   Total Minutes: 12                Anni Gordon PT  6/24/2025

## 2025-06-25 ENCOUNTER — HOSPITAL ENCOUNTER (OUTPATIENT)
Dept: OCCUPATIONAL THERAPY | Facility: HOSPITAL | Age: 60
Setting detail: THERAPIES SERIES
Discharge: HOME | End: 2025-06-25
Payer: COMMERCIAL

## 2025-06-25 DIAGNOSIS — M79.89 LEFT LEG SWELLING: ICD-10-CM

## 2025-06-25 DIAGNOSIS — I89.0 LYMPHEDEMA: Primary | ICD-10-CM

## 2025-06-25 PROCEDURE — 97140 MANUAL THERAPY 1/> REGIONS: CPT

## 2025-06-25 NOTE — THERAPY TREATMENT NOTE
Outpatient Occupational Therapy Lymphedema Treatment Note  ERIC Conway     Patient Name: Pam Cade  : 1965  MRN: [id number]  Today's Date: 2025      Visit Date: 2025    Patient Active Problem List   Diagnosis    Allergic rhinitis due to allergen    Ankle pain    Arthritis    B12 deficiency    Bladder disorder    Cramps of lower extremity    Gastroesophageal reflux disease without esophagitis    Folic acid deficiency    Foot pain, left    Hypertension    Iron deficiency    Kidney problem    Ulcerative lesion    Urinary urgency    Nocturia    Increased frequency of urination    Neck pain    Acute pain of right shoulder    Radiculitis, cervical    Chronic headaches    Epigastric pain    Mixed hyperlipidemia    Abnormal ECG    Abnormal nuclear stress test    Cardiomyopathy, dilated    Anemia    Anxiety    Mild episode of recurrent major depressive disorder    Panic attack    Nail fungus    Tachycardia    Chronic systolic heart failure    Thyroid nodule    Diarrhea    Vision changes    Stabbing headache    Lightheaded    Hematuria    Dysuria    Acute cystitis with hematuria    Eustachian tube disorder, bilateral    Herpes    Recurrent cold sores    Class 3 severe obesity due to excess calories with serious comorbidity and body mass index (BMI) of 40.0 to 44.9 in adult    Impaired fasting glucose    Congestion of nasal sinus    Annual physical exam    Palpitations    Allergic rhinitis    Pain of left lower extremity    Left leg swelling    Vitamin D deficiency    Fatigue        Past Medical History:   Diagnosis Date    Allergic rhinitis due to allergen 2021    Ankle pain     Arthritis     B12 deficiency 2019    Bladder disorder     Condition not found     Ulcer    Cramps of lower extremity     Folic acid deficiency 2019    Foot pain, left 2018    High cholesterol     Hypertension     Kidney problem         Past Surgical History:   Procedure Laterality Date    CARDIAC  CATHETERIZATION N/A 3/3/2022    Procedure: Left Heart Cath - right radial;  Surgeon: CATHERINE Hall MD;  Location: [facility] CATH INVASIVE LOCATION;  Service: Cardiology;  Laterality: N/A;    HIATAL HERNIA REPAIR  02/2019    HYSTERECTOMY      INCONTINENCE SURGERY      LAPAROSCOPIC CHOLECYSTECTOMY  02/2019    OTHER SURGICAL HISTORY      Artificial Joints/Limbs    OTHER SURGICAL HISTORY      Joint surgery    REVISION / REMOVAL NEUROSTIMULATOR      TOTAL KNEE ARTHROPLASTY Left          Visit Dx:      ICD-10-CM ICD-9-CM   1. Lymphedema  I89.0 457.1   2. Left leg swelling  M79.89 729.81        Lymphedema       Row Name 06/25/25 0700             Subjective Pain    Able to rate subjective pain? yes  -SC      Pre-Treatment Pain Level 5  -SC      Post-Treatment Pain Level 5  -SC      Subjective Pain Comment L knee  -SC         Subjective    Subjective Comments Pt states that the thigh portion of the compression wrapping came off over the weekend and then the lower leg came off yesterday. Pt does state that when she removed the wrapping  she was very tender on the proximal portion of L anterior LE just inferior to patella  -SC         Skin Changes/Observations    Location/Assessment Lower Extremity  -SC      Lower Extremity Conditions left:;clean;dry;intact  -SC      Lower Extremity Color/Pigment left:;erythema  -SC         Compression/Skin Care    Compression/Skin Care skin care;wrapping location;bandaging  -SC      Skin Care moisturizing lotion applied  -SC      Wrapping Location lower extremity  -SC      Wrapping Location LE left:;foot to groin  -SC      Bandage Layers cotton liner;padding/fluff layer;soft foam- 1/4 inch;soft foam- 1/2 inch;short-stretch bandages (comment size/quantity)  -SC      Bandaging Technique circumferential/spiral;strong compression  -SC                User Key  (r) = Recorded By, (t) = Taken By, (c) = Cosigned By      Initials Name Provider Type    SC Emma Lindquist COTA Occupational Therapist  Assistant                             OT Assessment/Plan       Row Name 06/25/25 1030          OT Assessment    Assessment Comments Pt will benefit from continued OT skilled services until pt has reached plateau and is independent with complete decongestive therapy  -SC     Patient would benefit from skilled therapy intervention Yes  -SC        OT Plan    OT Plan Comments Continue POC  -SC               User Key  (r) = Recorded By, (t) = Taken By, (c) = Cosigned By      Initials Name Provider Type    Emma Moreno COTA Occupational Therapist Assistant                        Manual Rx (Last 36 Hours)       Manual Treatments       Row Name 06/25/25 1031             Total Minutes    47581 - OT Manual Therapy Minutes 39  -SC                User Key  (r) = Recorded By, (t) = Taken By, (c) = Cosigned By      Initials Name Provider Type    Emma Moreno COTA Occupational Therapist Assistant                                       Time Calculation:   Timed Charges  18407 - OT Manual Therapy Minutes: 39  Total Minutes  Timed Charges Total Minutes: 39   Total Minutes: 39     Therapy Charges for Today       Code Description Service Date Service Provider Modifiers Qty    59310166789 HC OT MANUAL THERAPY EA 15 MIN 6/25/2025 Emma Lindquist COTA GO 3                        ESTHER Preciado  6/25/2025

## 2025-06-26 ENCOUNTER — APPOINTMENT (OUTPATIENT)
Facility: HOSPITAL | Age: 60
End: 2025-06-26

## 2025-06-27 ENCOUNTER — HOSPITAL ENCOUNTER (OUTPATIENT)
Dept: OCCUPATIONAL THERAPY | Facility: HOSPITAL | Age: 60
Setting detail: THERAPIES SERIES
Discharge: HOME | End: 2025-06-27
Payer: COMMERCIAL

## 2025-06-27 DIAGNOSIS — I89.0 LYMPHEDEMA: Primary | ICD-10-CM

## 2025-06-27 DIAGNOSIS — M79.89 LEFT LEG SWELLING: ICD-10-CM

## 2025-06-27 PROCEDURE — 97140 MANUAL THERAPY 1/> REGIONS: CPT

## 2025-06-27 PROCEDURE — 97535 SELF CARE MNGMENT TRAINING: CPT

## 2025-06-27 NOTE — THERAPY TREATMENT NOTE
Outpatient Occupational Therapy Lymphedema Treatment Note  ERIC Conway     Patient Name: Pam Cade  : 1965  MRN: [id number]  Today's Date: 2025      Visit Date: 2025    Patient Active Problem List   Diagnosis    Allergic rhinitis due to allergen    Ankle pain    Arthritis    B12 deficiency    Bladder disorder    Cramps of lower extremity    Gastroesophageal reflux disease without esophagitis    Folic acid deficiency    Foot pain, left    Hypertension    Iron deficiency    Kidney problem    Ulcerative lesion    Urinary urgency    Nocturia    Increased frequency of urination    Neck pain    Acute pain of right shoulder    Radiculitis, cervical    Chronic headaches    Epigastric pain    Mixed hyperlipidemia    Abnormal ECG    Abnormal nuclear stress test    Cardiomyopathy, dilated    Anemia    Anxiety    Mild episode of recurrent major depressive disorder    Panic attack    Nail fungus    Tachycardia    Chronic systolic heart failure    Thyroid nodule    Diarrhea    Vision changes    Stabbing headache    Lightheaded    Hematuria    Dysuria    Acute cystitis with hematuria    Eustachian tube disorder, bilateral    Herpes    Recurrent cold sores    Class 3 severe obesity due to excess calories with serious comorbidity and body mass index (BMI) of 40.0 to 44.9 in adult    Impaired fasting glucose    Congestion of nasal sinus    Annual physical exam    Palpitations    Allergic rhinitis    Pain of left lower extremity    Left leg swelling    Vitamin D deficiency    Fatigue        Past Medical History:   Diagnosis Date    Allergic rhinitis due to allergen 2021    Ankle pain     Arthritis     B12 deficiency 2019    Bladder disorder     Condition not found     Ulcer    Cramps of lower extremity     Folic acid deficiency 2019    Foot pain, left 2018    High cholesterol     Hypertension     Kidney problem         Past Surgical History:   Procedure Laterality Date    CARDIAC  CATHETERIZATION N/A 3/3/2022    Procedure: Left Heart Cath - right radial;  Surgeon: CATHERINE Hall MD;  Location: [facility] CATH INVASIVE LOCATION;  Service: Cardiology;  Laterality: N/A;    HIATAL HERNIA REPAIR  02/2019    HYSTERECTOMY      INCONTINENCE SURGERY      LAPAROSCOPIC CHOLECYSTECTOMY  02/2019    OTHER SURGICAL HISTORY      Artificial Joints/Limbs    OTHER SURGICAL HISTORY      Joint surgery    REVISION / REMOVAL NEUROSTIMULATOR      TOTAL KNEE ARTHROPLASTY Left          Visit Dx:      ICD-10-CM ICD-9-CM   1. Lymphedema  I89.0 457.1   2. Left leg swelling  M79.89 729.81        Lymphedema       Row Name 06/27/25 0700             Subjective Pain    Able to rate subjective pain? yes  -SC      Pre-Treatment Pain Level 0  -SC      Post-Treatment Pain Level 0  -SC      Subjective Pain Comment Pt denies pain  -SC         Subjective    Subjective Comments Pt states she tolerated the wrap well  -SC         Skin Changes/Observations    Location/Assessment Lower Extremity  -SC      Lower Extremity Conditions left:;clean;dry;intact  -SC      Lower Extremity Color/Pigment left:;normal  -SC      Skin Observations Comment Patient arriving donning compression wrapping from previous appointment  -SC         Lymphedema Measurements    Measurement Type(s) Circumferential  -SC      Circumferential Areas Lower extremities  -SC         Compression/Skin Care    Compression/Skin Care skin care;remove bandages  -SC      Skin Care washed/dried  -SC      Compression/Skin Care Comments Applying Kinesiotape with anchors at quadricep, hamstring x 2, webbing to lower leg following contour of knee to assist with reduction of swelling  -SC                User Key  (r) = Recorded By, (t) = Taken By, (c) = Cosigned By      Initials Name Provider Type    SC Emma Lindquist COTA Occupational Therapist Assistant                Circumferential Measurements       Baseline: R: 2649.99 ml L: 2800.58ml  % volume change:   L: +4%              OT  Assessment/Plan       Row Name 06/27/25 0747          OT Assessment    Assessment Comments Patient is in good progress however due to wrapping that is prone to slipping on thigh patient fluid is being accumulated in different areas of the left lower extremity.  Patient will benefit from continued skilled occupational therapy services and techniques to assist with reduction in postsurgical swelling until patient is demonstrating normalized lymphatic functioning and is independent with complete decongestive therapy.  -SC     Patient would benefit from skilled therapy intervention Yes  -SC        OT Plan    OT Plan Comments Continue POC  -SC               User Key  (r) = Recorded By, (t) = Taken By, (c) = Cosigned By      Initials Name Provider Type    Emma Moreno COTA Occupational Therapist Assistant                        Manual Rx (Last 36 Hours)       Manual Treatments       Row Name 06/27/25 0749             Total Minutes    55545 - OT Manual Therapy Minutes 10  -SC                User Key  (r) = Recorded By, (t) = Taken By, (c) = Cosigned By      Initials Name Provider Type    Emma Moreno COTA Occupational Therapist Assistant                      Therapy Education  Education Details: Therapist educating patient on Kinesiotape purpose and wear schedule of tape.  Patient is able to keep the tape on as long as possible.  Patient is able to shower with tape just do not scrub over the top.  Patient is to monitor for any skin changes including burning or redness and to remove immediately if this occurs.  Given: Symptoms/condition management  Program: New  How Provided: Verbal  Provided to: Patient  Level of Understanding: Verbalized  65088 - OT Self Care/Mgmt Minutes: 34                Time Calculation:   Timed Charges  07922 - OT Manual Therapy Minutes: 10  36602 - OT Self Care/Mgmt Minutes: 34  Total Minutes  Timed Charges Total Minutes: 44   Total Minutes: 44     Therapy Charges for Today       Code  Description Service Date Service Provider Modifiers Qty    64531058650 HC OT MANUAL THERAPY EA 15 MIN 6/27/2025 Emma Lindquist COTA GO 1    [id number] HC OT SELF CARE/MGMT/TRAIN EA 15 MIN 6/27/2025 Emma Lindquist COTA GO 2                        ESTHER Preciado  6/27/2025

## 2025-07-01 ENCOUNTER — HOSPITAL ENCOUNTER (OUTPATIENT)
Dept: OCCUPATIONAL THERAPY | Facility: HOSPITAL | Age: 60
Setting detail: THERAPIES SERIES
Discharge: HOME | End: 2025-07-01
Payer: COMMERCIAL

## 2025-07-01 DIAGNOSIS — I89.0 LYMPHEDEMA: Primary | ICD-10-CM

## 2025-07-01 PROCEDURE — 97140 MANUAL THERAPY 1/> REGIONS: CPT

## 2025-07-01 NOTE — THERAPY TREATMENT NOTE
Outpatient Occupational Therapy Lymphedema Treatment Note  ERIC Conway     Patient Name: Pam Cade  : 1965  MRN: [id number]  Today's Date: 2025      Visit Date: 2025    Patient Active Problem List   Diagnosis    Allergic rhinitis due to allergen    Ankle pain    Arthritis    B12 deficiency    Bladder disorder    Cramps of lower extremity    Gastroesophageal reflux disease without esophagitis    Folic acid deficiency    Foot pain, left    Hypertension    Iron deficiency    Kidney problem    Ulcerative lesion    Urinary urgency    Nocturia    Increased frequency of urination    Neck pain    Acute pain of right shoulder    Radiculitis, cervical    Chronic headaches    Epigastric pain    Mixed hyperlipidemia    Abnormal ECG    Abnormal nuclear stress test    Cardiomyopathy, dilated    Anemia    Anxiety    Mild episode of recurrent major depressive disorder    Panic attack    Nail fungus    Tachycardia    Chronic systolic heart failure    Thyroid nodule    Diarrhea    Vision changes    Stabbing headache    Lightheaded    Hematuria    Dysuria    Acute cystitis with hematuria    Eustachian tube disorder, bilateral    Herpes    Recurrent cold sores    Class 3 severe obesity due to excess calories with serious comorbidity and body mass index (BMI) of 40.0 to 44.9 in adult    Impaired fasting glucose    Congestion of nasal sinus    Annual physical exam    Palpitations    Allergic rhinitis    Pain of left lower extremity    Left leg swelling    Vitamin D deficiency    Fatigue        Past Medical History:   Diagnosis Date    Allergic rhinitis due to allergen 2021    Ankle pain     Arthritis     B12 deficiency 2019    Bladder disorder     Condition not found     Ulcer    Cramps of lower extremity     Folic acid deficiency 2019    Foot pain, left 2018    High cholesterol     Hypertension     Kidney problem         Past Surgical History:   Procedure Laterality Date    CARDIAC  CATHETERIZATION N/A 3/3/2022    Procedure: Left Heart Cath - right radial;  Surgeon: CATHERINE Hall MD;  Location: [facility] CATH INVASIVE LOCATION;  Service: Cardiology;  Laterality: N/A;    HIATAL HERNIA REPAIR  02/2019    HYSTERECTOMY      INCONTINENCE SURGERY      LAPAROSCOPIC CHOLECYSTECTOMY  02/2019    OTHER SURGICAL HISTORY      Artificial Joints/Limbs    OTHER SURGICAL HISTORY      Joint surgery    REVISION / REMOVAL NEUROSTIMULATOR      TOTAL KNEE ARTHROPLASTY Left          Visit Dx:      ICD-10-CM ICD-9-CM   1. Lymphedema  I89.0 457.1        Lymphedema       Row Name 07/01/25 0800             Subjective Pain    Able to rate subjective pain? yes  -SC      Pre-Treatment Pain Level 0  -SC      Post-Treatment Pain Level 0  -SC      Subjective Pain Comment Pt denies pain  -SC         Subjective    Subjective Comments Pt feels her L LE is tighter today  -SC         Skin Changes/Observations    Location/Assessment Lower Extremity  -SC      Lower Extremity Conditions left:;clean;dry;intact  -SC      Lower Extremity Color/Pigment left:;normal  -SC      Lower Extremity Skin Details Pt noted with visible increase in volume on L LE  -SC      Skin Observations Comment Pt arriving donning media tape from previous appointment  -SC         Lymphedema Measurements    Measurement Type(s) Circumferential  -SC      Circumferential Areas Lower extremities  -SC         Compression/Skin Care    Compression/Skin Care skin care;wrapping location;bandaging  -SC      Skin Care moisturizing lotion applied  -SC      Wrapping Location lower extremity  -SC      Wrapping Location LE left:;foot to groin  -SC      Bandage Layers cotton liner;padding/fluff layer;short-stretch bandages (comment size/quantity);soft foam- 1/4 inch  -SC      Bandaging Comments 1/4 inch foam applied to circumference of left ankle to build shape and improve comfort  -SC      Bandaging Technique circumferential/spiral;strong compression  -SC      Compression/Skin  Care Comments Kinesiotape was removed this date with no skin irritation  -SC                User Key  (r) = Recorded By, (t) = Taken By, (c) = Cosigned By      Initials Name Provider Type    Emma Moreno COTA Occupational Therapist Assistant                Circumferential Measurements       Baseline: R: 2649.99 ml L: 2800.58ml  % volume change:   L: +4%              OT Assessment/Plan       Row Name 07/01/25 0826          OT Assessment    Assessment Comments Patient is had exacerbation of symptoms and will benefit from continued skilled occupational therapy services until patient has demonstrated plateau in lymph volume and normalized lymphatic functioning while demonstrating independence with complete decongestive therapy.  -SC     Patient would benefit from skilled therapy intervention Yes  -SC        OT Plan    OT Plan Comments Continue POC  -SC               User Key  (r) = Recorded By, (t) = Taken By, (c) = Cosigned By      Initials Name Provider Type    Emma Moreno COTA Occupational Therapist Assistant                        Manual Rx (Last 36 Hours)       Manual Treatments       Row Name 07/01/25 0826             Total Minutes    39790 - OT Manual Therapy Minutes 38  -SC                User Key  (r) = Recorded By, (t) = Taken By, (c) = Cosigned By      Initials Name Provider Type    Emma Moreno COTA Occupational Therapist Assistant                                       Time Calculation:   Timed Charges  31955 - OT Manual Therapy Minutes: 38  Total Minutes  Timed Charges Total Minutes: 38   Total Minutes: 38     Therapy Charges for Today       Code Description Service Date Service Provider Modifiers Qty    11788131586  OT MANUAL THERAPY EA 15 MIN 7/1/2025 Emma Lindquist COTA GO 3                        ESTHER Preciado  7/1/2025

## 2025-07-02 ENCOUNTER — TELEPHONE (OUTPATIENT)
Dept: ORTHOPEDIC SURGERY | Facility: CLINIC | Age: 60
End: 2025-07-02
Payer: COMMERCIAL

## 2025-07-02 ENCOUNTER — TELEPHONE (OUTPATIENT)
Dept: ORTHOPEDIC SURGERY | Facility: CLINIC | Age: 60
End: 2025-07-02

## 2025-07-02 NOTE — TELEPHONE ENCOUNTER
PATIENT CALLED STATING HER W/C NURSE  IS REQUESTING AN EARLIER APPOINTMENT TO HAVE THE LEFT KNEE RE-EVALUATED DUE TO SWELLING. APPOINTMENT SCHEDULED FOR TUESDAY 07/08/2025 AT 1300 WITH MACKENZIE TOSCANO. PATIENT VERBALIZED UNDERSTANDING OF DATE AND TIME.

## 2025-07-02 NOTE — TELEPHONE ENCOUNTER
"Provider: ALONZO    Caller: Pam Cade \"Pam Cade\"    Relationship to Patient: Self    Pharmacy:     Phone Number: [phone]    Reason for Call: PT CALLED AND STATED THAT SHE NEEDS SOME KIND OF NOTE OR TO BE SEEN SOONER SO SHE DOESN'T LOSE ANY PAY BECAUSE HER WORK CANNOT ACCOMODATE HER RESTRICTIONS AND PT STATES THAT WITH THE WAY HER LEG IS WRAPPED SHE CAN'T EVEN PUT A SHOE IN LET ALONE HER UNIFORM - PLEASE ADVISE - ATTEMPTED TO WT PT WANTED TO SPEAK TO OFFICE AND MEMBER OF OFFICE WAS IN CHART  "

## 2025-07-02 NOTE — TELEPHONE ENCOUNTER
"Hub staff attempted to follow warm transfer process and was unsuccessful     Caller: Pam Cade \"Pam Cade\"    Relationship to patient: Self    Best call back number: [phone]    Patient is needing: WANTS TO SPEAK TO SOMEONE IN OFFICE REGARDING HER WORK NOTE- ADVISED THERE IS A 48 HR TURN AROUND TIME FOR MESSAGES- SHE IS SUPPOSED TO WORK THIS WEEKEND AND THE OFFICE IS CLOSED FRIDAY- PLEASE CALL        "

## 2025-07-03 ENCOUNTER — HOSPITAL ENCOUNTER (OUTPATIENT)
Dept: OCCUPATIONAL THERAPY | Facility: HOSPITAL | Age: 60
Setting detail: THERAPIES SERIES
Discharge: HOME | End: 2025-07-03
Payer: COMMERCIAL

## 2025-07-03 DIAGNOSIS — I89.0 LYMPHEDEMA: Primary | ICD-10-CM

## 2025-07-03 PROCEDURE — 97140 MANUAL THERAPY 1/> REGIONS: CPT

## 2025-07-03 PROCEDURE — 97535 SELF CARE MNGMENT TRAINING: CPT

## 2025-07-03 NOTE — THERAPY TREATMENT NOTE
Outpatient Occupational Therapy Lymphedema Treatment Note  ERIC Conway     Patient Name: Pam Cade  : 1965  MRN: [id number]  Today's Date: 7/3/2025      Visit Date: 2025    Patient Active Problem List   Diagnosis    Allergic rhinitis due to allergen    Ankle pain    Arthritis    B12 deficiency    Bladder disorder    Cramps of lower extremity    Gastroesophageal reflux disease without esophagitis    Folic acid deficiency    Foot pain, left    Hypertension    Iron deficiency    Kidney problem    Ulcerative lesion    Urinary urgency    Nocturia    Increased frequency of urination    Neck pain    Acute pain of right shoulder    Radiculitis, cervical    Chronic headaches    Epigastric pain    Mixed hyperlipidemia    Abnormal ECG    Abnormal nuclear stress test    Cardiomyopathy, dilated    Anemia    Anxiety    Mild episode of recurrent major depressive disorder    Panic attack    Nail fungus    Tachycardia    Chronic systolic heart failure    Thyroid nodule    Diarrhea    Vision changes    Stabbing headache    Lightheaded    Hematuria    Dysuria    Acute cystitis with hematuria    Eustachian tube disorder, bilateral    Herpes    Recurrent cold sores    Class 3 severe obesity due to excess calories with serious comorbidity and body mass index (BMI) of 40.0 to 44.9 in adult    Impaired fasting glucose    Congestion of nasal sinus    Annual physical exam    Palpitations    Allergic rhinitis    Pain of left lower extremity    Left leg swelling    Vitamin D deficiency    Fatigue        Past Medical History:   Diagnosis Date    Allergic rhinitis due to allergen 2021    Ankle pain     Arthritis     B12 deficiency 2019    Bladder disorder     Condition not found     Ulcer    Cramps of lower extremity     Folic acid deficiency 2019    Foot pain, left 2018    High cholesterol     Hypertension     Kidney problem         Past Surgical History:   Procedure Laterality Date    CARDIAC  CATHETERIZATION N/A 3/3/2022    Procedure: Left Heart Cath - right radial;  Surgeon: CATHERINE Hall MD;  Location: [facility] CATH INVASIVE LOCATION;  Service: Cardiology;  Laterality: N/A;    HIATAL HERNIA REPAIR  02/2019    HYSTERECTOMY      INCONTINENCE SURGERY      LAPAROSCOPIC CHOLECYSTECTOMY  02/2019    OTHER SURGICAL HISTORY      Artificial Joints/Limbs    OTHER SURGICAL HISTORY      Joint surgery    REVISION / REMOVAL NEUROSTIMULATOR      TOTAL KNEE ARTHROPLASTY Left          Visit Dx:      ICD-10-CM ICD-9-CM   1. Lymphedema  I89.0 457.1        Lymphedema       Row Name 07/03/25 0700             Subjective Pain    Able to rate subjective pain? yes  -SC      Pre-Treatment Pain Level 0  -SC      Post-Treatment Pain Level 0  -SC      Subjective Pain Comment Pt denies pain; c/o tightness in L knee  -SC         Subjective    Subjective Comments Pt states the thigh wrapping came off last night  -SC         Skin Changes/Observations    Location/Assessment Lower Extremity  -SC      Lower Extremity Conditions left:;clean;dry;intact  -SC      Lower Extremity Color/Pigment left:;normal  -SC      Skin Observations Comment Patient arriving donning compression wrapping on lower portion of L LE from previous appointment  -SC         Lymphedema Measurements    Measurement Type(s) Circumferential  -SC      Circumferential Areas Lower extremities  -SC         Compression/Skin Care    Compression/Skin Care remove bandages  -SC                User Key  (r) = Recorded By, (t) = Taken By, (c) = Cosigned By      Initials Name Provider Type    SC Emma Lindquist COTA Occupational Therapist Assistant                Circumferential Measurements      Baseline: R: 2649.99 ml L: 2800.58ml  % volume change:   L: +1%              OT Assessment/Plan       Row Name 07/03/25 1005          OT Assessment    Assessment Comments Pt is waiting for permanent compression garments to arrive and will benefit from continued OT skilled services to assist  with lymphedema management until garments have been obtained, education provided, and pt is demonstrating independence with complete decongestive therapy.  -SC     Patient would benefit from skilled therapy intervention Yes  -SC        OT Plan    OT Plan Comments Continue POC  -SC               User Key  (r) = Recorded By, (t) = Taken By, (c) = Cosigned By      Initials Name Provider Type    Emma Moreno COTA Occupational Therapist Assistant                        Manual Rx (Last 36 Hours)       Manual Treatments       Row Name 07/03/25 1006             Total Minutes    04613 - OT Manual Therapy Minutes 10  -SC                User Key  (r) = Recorded By, (t) = Taken By, (c) = Cosigned By      Initials Name Provider Type    Emma Moreno COTA Occupational Therapist Assistant                      Therapy Education  Education Details: Therapist discussing with patient regarding not wrapping left lower extremity over the weekend to assess lymphatic functioning.  Therapist also discussing garment options with patient.  Patient opting for Medi plus thigh-high and did pay for 1 pair out-of-pocket at this date size 6 regular.  Patient is to bring garment in with her when it arrives  Given: Symptoms/condition management  Program: New  How Provided: Verbal  Provided to: Patient  Level of Understanding: Verbalized  63293 - OT Self Care/Mgmt Minutes: 31                Time Calculation:   Timed Charges  07832 - OT Manual Therapy Minutes: 10  17559 - OT Self Care/Mgmt Minutes: 31  Total Minutes  Timed Charges Total Minutes: 41   Total Minutes: 41     Therapy Charges for Today       Code Description Service Date Service Provider Modifiers Qty    51954653656 HC OT MANUAL THERAPY EA 15 MIN 7/3/2025 Emma Lindquist COTA GO 1    14377271725 HC OT SELF CARE/MGMT/TRAIN EA 15 MIN 7/3/2025 Emma Lindquist COTA GO 2                        ESTHER Preciado  7/3/2025

## 2025-07-07 ENCOUNTER — APPOINTMENT (OUTPATIENT)
Dept: OCCUPATIONAL THERAPY | Facility: HOSPITAL | Age: 60
End: 2025-07-07
Payer: COMMERCIAL

## 2025-07-08 ENCOUNTER — HOSPITAL ENCOUNTER (OUTPATIENT)
Dept: OCCUPATIONAL THERAPY | Facility: HOSPITAL | Age: 60
Setting detail: THERAPIES SERIES
Discharge: HOME | End: 2025-07-08
Payer: COMMERCIAL

## 2025-07-08 ENCOUNTER — APPOINTMENT (OUTPATIENT)
Dept: OCCUPATIONAL THERAPY | Facility: HOSPITAL | Age: 60
End: 2025-07-08
Payer: COMMERCIAL

## 2025-07-08 ENCOUNTER — OFFICE VISIT (OUTPATIENT)
Dept: ORTHOPEDIC SURGERY | Facility: CLINIC | Age: 60
End: 2025-07-08
Payer: COMMERCIAL

## 2025-07-08 VITALS — DIASTOLIC BLOOD PRESSURE: 93 MMHG | OXYGEN SATURATION: 96 % | HEART RATE: 101 BPM | SYSTOLIC BLOOD PRESSURE: 171 MMHG

## 2025-07-08 DIAGNOSIS — M79.89 LEFT LEG SWELLING: ICD-10-CM

## 2025-07-08 DIAGNOSIS — Z96.652 HISTORY OF TOTAL KNEE ARTHROPLASTY, LEFT: Primary | ICD-10-CM

## 2025-07-08 DIAGNOSIS — S89.92XS LEFT KNEE INJURY, SEQUELA: ICD-10-CM

## 2025-07-08 DIAGNOSIS — I89.0 LYMPHEDEMA: Primary | ICD-10-CM

## 2025-07-08 PROCEDURE — 97140 MANUAL THERAPY 1/> REGIONS: CPT

## 2025-07-08 NOTE — THERAPY TREATMENT NOTE
Outpatient Occupational Therapy Lymphedema Treatment Note  ERIC Conway     Patient Name: Pam Cade  : 1965  MRN: [id number]  Today's Date: 2025      Visit Date: 2025    Patient Active Problem List   Diagnosis    Allergic rhinitis due to allergen    Ankle pain    Arthritis    B12 deficiency    Bladder disorder    Cramps of lower extremity    Gastroesophageal reflux disease without esophagitis    Folic acid deficiency    Foot pain, left    Hypertension    Iron deficiency    Kidney problem    Ulcerative lesion    Urinary urgency    Nocturia    Increased frequency of urination    Neck pain    Acute pain of right shoulder    Radiculitis, cervical    Chronic headaches    Epigastric pain    Mixed hyperlipidemia    Abnormal ECG    Abnormal nuclear stress test    Cardiomyopathy, dilated    Anemia    Anxiety    Mild episode of recurrent major depressive disorder    Panic attack    Nail fungus    Tachycardia    Chronic systolic heart failure    Thyroid nodule    Diarrhea    Vision changes    Stabbing headache    Lightheaded    Hematuria    Dysuria    Acute cystitis with hematuria    Eustachian tube disorder, bilateral    Herpes    Recurrent cold sores    Class 3 severe obesity due to excess calories with serious comorbidity and body mass index (BMI) of 40.0 to 44.9 in adult    Impaired fasting glucose    Congestion of nasal sinus    Annual physical exam    Palpitations    Allergic rhinitis    Pain of left lower extremity    Left leg swelling    Vitamin D deficiency    Fatigue        Past Medical History:   Diagnosis Date    Allergic rhinitis due to allergen 2021    Ankle pain     Arthritis     B12 deficiency 2019    Bladder disorder     Condition not found     Ulcer    Cramps of lower extremity     Folic acid deficiency 2019    Foot pain, left 2018    High cholesterol     Hypertension     Kidney problem         Past Surgical History:   Procedure Laterality Date    CARDIAC  CATHETERIZATION N/A 3/3/2022    Procedure: Left Heart Cath - right radial;  Surgeon: CATHERINE Hall MD;  Location: [facility] CATH INVASIVE LOCATION;  Service: Cardiology;  Laterality: N/A;    HIATAL HERNIA REPAIR  02/2019    HYSTERECTOMY      INCONTINENCE SURGERY      LAPAROSCOPIC CHOLECYSTECTOMY  02/2019    OTHER SURGICAL HISTORY      Artificial Joints/Limbs    OTHER SURGICAL HISTORY      Joint surgery    REVISION / REMOVAL NEUROSTIMULATOR      TOTAL KNEE ARTHROPLASTY Left          Visit Dx:      ICD-10-CM ICD-9-CM   1. Lymphedema  I89.0 457.1   2. Left leg swelling  M79.89 729.81        Lymphedema       Row Name 07/08/25 0900             Subjective Pain    Able to rate subjective pain? yes  -SC      Pre-Treatment Pain Level 5  -SC      Post-Treatment Pain Level 5  -SC      Subjective Pain Comment L knee when standing only  -SC         Subjective    Subjective Comments Pt states she did well over the weekend with no wrapping  -SC         Skin Changes/Observations    Location/Assessment Lower Extremity  -SC      Lower Extremity Conditions left:;clean;dry;intact  -SC      Lower Extremity Color/Pigment left:;normal  -SC         Lymphedema Measurements    Measurement Type(s) Circumferential  -SC      Circumferential Areas Lower extremities  -SC         Manual Lymphatic Drainage    Manual Lymphatic Drainage initial sequence;opened regional lymph nodes;opened anastamoses;extremity treatment  -SC      Initial Sequence short neck;cervical;supraclavicular;shoulder collectors  -SC      Opened Regional Lymph Nodes inguinal;axillary  -SC      Axillary left  -SC      Inguinal left  -SC      Opened Anastamoses inguino-axillary  -SC      Inguino-Axillary left  -SC      Extremity Treatment MLD to full limb  -SC      MLD to Full Limb L LE  -SC         Compression/Skin Care    Compression/Skin Care skin care;wrapping location;bandaging  -SC      Skin Care moisturizing lotion applied  -SC      Wrapping Location lower extremity  -SC       Wrapping Location LE left:;foot to groin  -SC      Bandage Layers cotton liner;short-stretch bandages (comment size/quantity)  -SC      Bandaging Comments Tubigrip E doubled applied to L LE to knee and short stretch bandages utilized from just inferior to patella to groin  -SC      Bandaging Technique circumferential/spiral;strong compression  -SC                User Key  (r) = Recorded By, (t) = Taken By, (c) = Cosigned By      Initials Name Provider Type    Emma Moreno COTA Occupational Therapist Assistant                Circumferential Measurements      Baseline: R: 2649.99 ml L: 2800.58ml  % volume change:   L: +6%              OT Assessment/Plan       Row Name 07/08/25 1046          OT Assessment    Assessment Comments Pt is waiting for permanent compression garments to arrive and will benefit from continued OT skilled services to assist with lymphedema management until garments have been obtained, education provided, and pt is demonstrating independence with complete decongestive therapy.  -SC     Patient would benefit from skilled therapy intervention Yes  -SC        OT Plan    OT Plan Comments Continue POC  -SC               User Key  (r) = Recorded By, (t) = Taken By, (c) = Cosigned By      Initials Name Provider Type    Emma Moreno COTA Occupational Therapist Assistant                        Manual Rx (Last 36 Hours)       Manual Treatments       Row Name 07/08/25 1047             Total Minutes    01203 - OT Manual Therapy Minutes 40  -SC                User Key  (r) = Recorded By, (t) = Taken By, (c) = Cosigned By      Initials Name Provider Type    Emma Moreno COTA Occupational Therapist Assistant                                       Time Calculation:   Timed Charges  23432 - OT Manual Therapy Minutes: 40  Total Minutes  Timed Charges Total Minutes: 40   Total Minutes: 40     Therapy Charges for Today       Code Description Service Date Service Provider Modifiers Qty     [id number]  OT MANUAL THERAPY EA 15 MIN 7/8/2025 Emma Lindquist COTA  3                        ESTHER Preciado  7/8/2025

## 2025-07-08 NOTE — PROGRESS NOTES
Chief Complaint  Follow-up of the Left Knee     Subjective        History of Present Illness  The patient is a 60-year-old female who presents for a follow-up on her left knee. She is accompanied by [employer]. . 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 She had her initial evaluation with Dr. Terrell on 04/03/2025.  She has been in physical therapy and discontinued the brace due to pain. At her last visit, she reported a positive response to therapy with significant improvement in her knee condition but experienced severe pain when transitioning from seated to standing, with pressure behind the kneecap necessitating small steps or support for stability as well as persistent swelling of the knee.  Her pain often subsides with ambulation.  Topical gel is helpful.  She could not tolerate diclofenac or Mobic and has been using Tylenol, heat, and ice.  I had suggested taping of the knee, advised to inquire if her physical therapist does this type of taping.     Today patient reports that the taping was a little different than I had described to her, and it resulted in severe swelling from the knee down prompting a referral from her physical therapist to the lymphedema clinic where she began getting leg wraps for lymphedema.  Patient notes today that she did go without these wraps over the weekend and did well with this.  Due to the leg wraps, she was unable to work a couple of weekends because she could not get a shoe to fit with how thick the wraps were.  The lymphedema clinic has ordered compression wraps for her which should be on Friday.    She continues to experience lateral knee pain since the twisting injury. She can bend her knee back a considerable distance but experiences a stabbing pain in that area when doing so. She has been attending physical therapy, which she found beneficial, particularly the  e-stim treatment, until therapy was halted for her to go to the lymphedema clinic.  While the leg wraps did help reduce the swelling initially, she now feels the swelling is reduced and voices interest in discontinuing the wraps.  Her knee still rosa when she gets up, but once she starts walking, it stabilizes unless she encounters uneven ground. She has been self-restricting her activities at work and has not been seeing patients since the onset of her symptoms. She has been doing less walking than usual. She has been able to progress from cycling backwards to forwards on the bike.    PAST SURGICAL HISTORY:  Left TKA in 2012.    SOCIAL HISTORY  Occupations: Unit secretary      Allergies   Allergen Reactions    Lortab [Hydrocodone-Acetaminophen] Itching     Pt states she takes tylenol at home        Social History     Socioeconomic History    Marital status:    Tobacco Use    Smoking status: Never    Smokeless tobacco: Never   Vaping Use    Vaping status: Never Used   Substance and Sexual Activity    Alcohol use: Yes     Comment: Current some day occasionally drinks, has been drinking for 6-10 years    Drug use: Never    Sexual activity: Defer        Tobacco Use: Low Risk  (7/8/2025)    Patient History     Smoking Tobacco Use: Never     Smokeless Tobacco Use: Never     Passive Exposure: Not on file     Patient reports that they are a nonsmoker; cessation education not applicable.     I reviewed the patient's chief complaint, history of present illness, review of systems, past medical history, surgical history, family history, social history, medications, and allergy list.     Review of Systems     Constitutional: Denies fevers, chills, weight loss  Cardiovascular: Denies chest pain, shortness of breath  Skin: Denies rashes, acute skin changes  Neurologic: Denies headache, loss of consciousness    Vital Signs:   /93 Comment: pt states she has not had BP medication yet  Pulse 101   SpO2 96%           Physical Exam  General: Alert. No acute distress    Ortho Exam    General: Alert, no acute distress  Left lower extremity: Mild knee effusion.  No tenderness to the medial joint line.  Mild tenderness to the lateral joint line.  110 degrees of flexion.  0 degrees extension. Knee extensor mechanism intact. Knee stable to varus and valgus stress. Calf soft, nontender. Demonstrates active ankle plantarflexion and dorsiflexion. Sensation intact over the dorsal and plantar foot.  Palpable pedal pulses.      Physical Exam          Assessment and Plan     Diagnoses and all orders for this visit:    1. History of total knee arthroplasty, left (Primary)  -     Ambulatory Referral to Physical Therapy for Evaluation & Treatment    2. Left knee injury, sequela  -     Ambulatory Referral to Physical Therapy for Evaluation & Treatment      Advised patient to discontinue use of the lymphedema wraps and further follow-ups with the lymphedema clinic, as that was not my intention when I had suggested taping.    Out of patient curiosity, we did review the type of taping ahead intended.  She voices interest in trying this taping, however I am apprehensive to try it again given the cascade of events that ensued.      She was noting improvement with physical therapy.  So she will discontinue visits with lymphedema clinic, return to outpatient physical therapy.    She does have lateral knee pain that I wonder if part of could be contributed from IT band issues.  She will discuss with physical therapist and work on stretches for this.    Work note provided to avoid repetitive stairs or lifting with breaks every 2 hours to ice and elevate x 15 minutes.    I would like to see her back in 1 month and hopefully by that time she will have progressed and be ready for return to work full duty.  If she does well with this progression, I anticipate MMI in the next 8 weeks or so.  Assessment & Plan      Patient or patient representative  verbalized consent for the use of Ambient Listening during the visit with  Mariaa Garcia PA-C for chart documentation. 7/11/2025  10:37 EDT    Follow Up   There are no Patient Instructions on file for this visit.        Patient was given instructions and counseling regarding her condition or for health maintenance advice. Please see specific information pulled into the AVS if appropriate.     Mariaa Garcia PA-C   07/08/2025  13:32 EDT        Dictated Utilizing Dragon Dictation. Please note that portions of this note were completed with a voice recognition program. Part of this note may be an electronic transcription/translation of spoken language to printed text using the Dragon Dictation System.

## 2025-07-10 ENCOUNTER — APPOINTMENT (OUTPATIENT)
Dept: OCCUPATIONAL THERAPY | Facility: HOSPITAL | Age: 60
End: 2025-07-10
Payer: COMMERCIAL

## 2025-07-11 ENCOUNTER — APPOINTMENT (OUTPATIENT)
Dept: OCCUPATIONAL THERAPY | Facility: HOSPITAL | Age: 60
End: 2025-07-11
Payer: COMMERCIAL

## 2025-07-14 ENCOUNTER — APPOINTMENT (OUTPATIENT)
Dept: OCCUPATIONAL THERAPY | Facility: HOSPITAL | Age: 60
End: 2025-07-14
Payer: COMMERCIAL

## 2025-07-14 ENCOUNTER — HOSPITAL ENCOUNTER (OUTPATIENT)
Facility: HOSPITAL | Age: 60
Setting detail: THERAPIES SERIES
Discharge: HOME | End: 2025-07-14
Payer: COMMERCIAL

## 2025-07-14 DIAGNOSIS — M25.662 DECREASED RANGE OF MOTION OF LEFT KNEE: ICD-10-CM

## 2025-07-14 DIAGNOSIS — M25.562 ACUTE PAIN OF LEFT KNEE: Primary | ICD-10-CM

## 2025-07-14 PROCEDURE — 97110 THERAPEUTIC EXERCISES: CPT | Performed by: PHYSICAL THERAPIST

## 2025-07-14 PROCEDURE — 97530 THERAPEUTIC ACTIVITIES: CPT | Performed by: PHYSICAL THERAPIST

## 2025-07-14 NOTE — THERAPY PROGRESS REPORT/RE-CERT
Re-Assessment / Re-Certification      Patient: Pam Cade   : 1965  Diagnosis/ICD-10 Code:  [unfilled]  Referring practitioner: RUIZ Guerra  Date of Initial Visit: Type: THERAPY  Episode: L knee pain  Today's Date: 2025  Patient seen for 10 sessions      Subjective:   Pam Cade reports: no pain at rest, but has 8/10 pain in left lateral knee with sit to stand transfers  Subjective Questionnaire: LEFS: 37/80=54% limitation  Clinical Progress: improved from initial evaluation  Home Program Compliance: Yes  Treatment has included: therapeutic exercise, manual therapy, therapeutic activity, and electrical stimulation    Subjective   Objective          Active Range of Motion   Left Knee   Flexion: 105 degrees   Extension: 0 degrees     Passive Range of Motion   Left Knee   Flexion: 110 degrees   Extension: 0 degrees     Strength/Myotome Testing     Left Knee   Flexion: 4  Extension: 4+      Assessment & Plan       Assessment  Impairments: activity intolerance, impaired physical strength and pain with function   Functional limitations: uncomfortable because of pain, standing, stooping and unable to perform repetitive tasks   Assessment details: Patient has made progress with therapy with improved left knee mobility/strength with decrease pain, but still has difficulty and pain with sit to stand transfers and would benefit from continued skilled therapy to return patient back to independent prior level of function.    Goals  Plan Goals: 1. The patient has limited ROM of the L knee.   LTG 1: 12 weeks:  The patient will demonstrate flexion to 105 degrees of ROM for the L knee in order to allow patient to perform sit to stand and go up/down stairs.   STATUS:  Met  STG 1a: 6 weeks:  The patient will demonstrate flexion to 100 degrees of ROM for the L knee.   STATUS: Met      2. The patient has limited strength of the L knee.   LTG 2: 12 weeks: The patient will demonstrate 5/5 strength for L knee  flexion and extension in order to allow patient improved joint stability.   STATUS:  progressing  STG 2a: 6 weeks: The patient will demonstrate 4+/5 strength for L knee flexion and extension   STATUS:  Met       3. The patient has gait dysfunction.   LTG 3: 12 weeks:  The patient will ambulate without assistive device, independently, for community distances with minimal limp to the L lower extremity in order to improve mobility and allow patient to perform activities such as grocery shopping with greater ease.   STATUS:  Met  STG 3a: The patient will be independent in HEP.   STATUS: Met and progressing as appropriate      4. The patient complains of L knee pain.   LTG 4: 12 weeks:  The patient will report a pain rating of 2/10 or better in order to improve tolerance to activities of daily living and improve sleep quality.   STATUS:  Ongoing  STG 4a: 6 weeks:  The patient will report a pain rating of 4/10 or better.   STATUS: ongoing      5. Mobility: Walking/Moving Around Functional Limitation                   LTG 5: 12 weeks:  The patient will demonstrate 15% limitation by achieving a score of 68/80 on the Lower Extremity Functional Scale.   STATUS:  progressing  STG 5a: 6 weeks:  The patient will demonstrate 19% limitation by achieving a score of 65/80 on the Lower Extremity Functional Scale.     STATUS:  not met      Plan  Therapy options: will be seen for skilled therapy services  Planned therapy interventions: manual therapy, strengthening, stretching, therapeutic activities, home exercise program, flexibility, functional ROM exercises and soft tissue mobilization  Frequency: 2x week  Duration in weeks: 8  Treatment plan discussed with: patient        Visit Diagnoses:    ICD-10-CM ICD-9-CM   1. Acute pain of left knee  M25.562 719.46   2. Decreased range of motion of left knee  M25.662 719.56       Progress toward previous goals: Partially Met       Recommendations: Continue as planned  Timeframe: 2  months  Prognosis to achieve goals: good    PT Signature: Anni Heidi, PT    Electronically singed 7/14/2025    [place] PT license: [id number]        90 Day Recertification  Certification Period: 7/14/2025 thru 10/11/2025  I certify that the therapy services are furnished while this patient is under my care.  The services outlined above are required by this patient, and will be reviewed every 90 days.    PHYSICIAN: Mariaa Garcia PA-C  NPI: [id number]                                      DATE:     Based upon review of the patient's progress and continued therapy plan, it is my medical opinion that Pam Cade should continue physical therapy treatment at [unfilled].    Signature: __________________________________  Mariaa Garcia PA-C    Timed:  Manual Therapy:         mins  39211;  Therapeutic Exercise:    18     mins  10751;     Neuromuscular Tobin:        mins  06702;    Therapeutic Activity:     10     mins  21229;     Gait Training:           mins  09119;     Ultrasound:          mins  41971;    Electrical Stimulation:         mins  92279 ( );    Untimed:  Electrical Stimulation:         mins  91946 ( );  Mechanical Traction:         mins  52851;     Timed Treatment:   38   mins   Total Treatment:     38   mins

## 2025-07-30 PROBLEM — U07.1 COVID-19 VIRUS DETECTED: Status: ACTIVE | Noted: 2025-07-30

## 2025-08-12 ENCOUNTER — OFFICE VISIT (OUTPATIENT)
Dept: ORTHOPEDIC SURGERY | Facility: CLINIC | Age: 60
End: 2025-08-12
Payer: COMMERCIAL

## 2025-08-12 VITALS
WEIGHT: 220.02 LBS | BODY MASS INDEX: 37.56 KG/M2 | HEART RATE: 93 BPM | OXYGEN SATURATION: 98 % | SYSTOLIC BLOOD PRESSURE: 136 MMHG | HEIGHT: 64 IN | DIASTOLIC BLOOD PRESSURE: 86 MMHG

## 2025-08-12 DIAGNOSIS — Z96.652 HISTORY OF TOTAL KNEE ARTHROPLASTY, LEFT: Primary | ICD-10-CM

## 2025-08-12 DIAGNOSIS — S89.92XS LEFT KNEE INJURY, SEQUELA: ICD-10-CM

## 2025-08-15 ENCOUNTER — HOSPITAL ENCOUNTER (OUTPATIENT)
Facility: HOSPITAL | Age: 60
Setting detail: THERAPIES SERIES
Discharge: HOME | End: 2025-08-15
Payer: COMMERCIAL

## 2025-08-15 DIAGNOSIS — M25.662 DECREASED RANGE OF MOTION OF LEFT KNEE: ICD-10-CM

## 2025-08-15 DIAGNOSIS — M25.562 ACUTE PAIN OF LEFT KNEE: Primary | ICD-10-CM

## 2025-08-15 PROCEDURE — 97530 THERAPEUTIC ACTIVITIES: CPT | Performed by: PHYSICAL THERAPIST

## 2025-08-15 PROCEDURE — 97110 THERAPEUTIC EXERCISES: CPT | Performed by: PHYSICAL THERAPIST

## 2025-08-18 ENCOUNTER — HOSPITAL ENCOUNTER (OUTPATIENT)
Facility: HOSPITAL | Age: 60
Setting detail: THERAPIES SERIES
Discharge: HOME | End: 2025-08-18
Payer: COMMERCIAL

## 2025-08-18 PROCEDURE — 97530 THERAPEUTIC ACTIVITIES: CPT | Performed by: PHYSICAL THERAPIST

## 2025-08-18 PROCEDURE — 97110 THERAPEUTIC EXERCISES: CPT | Performed by: PHYSICAL THERAPIST

## 2025-08-20 DIAGNOSIS — Z96.652 HISTORY OF TOTAL KNEE ARTHROPLASTY, LEFT: ICD-10-CM

## 2025-08-21 ENCOUNTER — HOSPITAL ENCOUNTER (OUTPATIENT)
Facility: HOSPITAL | Age: 60
Setting detail: THERAPIES SERIES
Discharge: HOME | End: 2025-08-21
Payer: COMMERCIAL

## 2025-08-21 DIAGNOSIS — M25.562 ACUTE PAIN OF LEFT KNEE: Primary | ICD-10-CM

## 2025-08-21 DIAGNOSIS — M25.662 DECREASED RANGE OF MOTION OF LEFT KNEE: ICD-10-CM

## 2025-08-21 PROCEDURE — 97110 THERAPEUTIC EXERCISES: CPT | Performed by: PHYSICAL THERAPIST

## 2025-08-21 PROCEDURE — 97530 THERAPEUTIC ACTIVITIES: CPT | Performed by: PHYSICAL THERAPIST

## 2025-08-26 ENCOUNTER — HOSPITAL ENCOUNTER (OUTPATIENT)
Facility: HOSPITAL | Age: 60
Setting detail: THERAPIES SERIES
Discharge: HOME | End: 2025-08-26
Payer: COMMERCIAL

## 2025-08-26 DIAGNOSIS — M25.662 DECREASED RANGE OF MOTION OF LEFT KNEE: ICD-10-CM

## 2025-08-26 DIAGNOSIS — M25.562 ACUTE PAIN OF LEFT KNEE: Primary | ICD-10-CM

## 2025-08-26 PROCEDURE — 97530 THERAPEUTIC ACTIVITIES: CPT | Performed by: PHYSICAL THERAPIST

## 2025-08-26 PROCEDURE — 97110 THERAPEUTIC EXERCISES: CPT | Performed by: PHYSICAL THERAPIST

## 2025-08-28 ENCOUNTER — HOSPITAL ENCOUNTER (OUTPATIENT)
Facility: HOSPITAL | Age: 60
Setting detail: THERAPIES SERIES
Discharge: HOME | End: 2025-08-28
Payer: COMMERCIAL

## 2025-08-28 DIAGNOSIS — M25.562 ACUTE PAIN OF LEFT KNEE: Primary | ICD-10-CM

## 2025-08-28 DIAGNOSIS — M25.662 DECREASED RANGE OF MOTION OF LEFT KNEE: ICD-10-CM

## 2025-08-28 PROCEDURE — 97530 THERAPEUTIC ACTIVITIES: CPT | Performed by: PHYSICAL THERAPIST

## 2025-08-28 PROCEDURE — 97110 THERAPEUTIC EXERCISES: CPT | Performed by: PHYSICAL THERAPIST

## (undated) DEVICE — CATH F5INF TLPIGST145 110CM6SH: Brand: INFINITI

## (undated) DEVICE — GLIDESHEATH SLENDER ACCESS KIT: Brand: GLIDESHEATH SLENDER

## (undated) DEVICE — TR BAND RADIAL ARTERY COMPRESSION DEVICE: Brand: TR BAND

## (undated) DEVICE — GW FC FLOP/TP .035 260CM 3MM

## (undated) DEVICE — RADIFOCUS GLIDEWIRE: Brand: GLIDEWIRE

## (undated) DEVICE — RADIFOCUS OPTITORQUE ANGIOGRAPHIC CATHETER: Brand: OPTITORQUE